# Patient Record
Sex: MALE | Race: BLACK OR AFRICAN AMERICAN | NOT HISPANIC OR LATINO | URBAN - METROPOLITAN AREA
[De-identification: names, ages, dates, MRNs, and addresses within clinical notes are randomized per-mention and may not be internally consistent; named-entity substitution may affect disease eponyms.]

---

## 2017-09-07 ENCOUNTER — INPATIENT (INPATIENT)
Facility: HOSPITAL | Age: 42
LOS: 4 days | Discharge: HOME | End: 2017-09-12
Attending: UROLOGY | Admitting: UROLOGY
Payer: MEDICAID

## 2017-09-07 VITALS
HEART RATE: 104 BPM | TEMPERATURE: 99 F | SYSTOLIC BLOOD PRESSURE: 124 MMHG | RESPIRATION RATE: 20 BRPM | OXYGEN SATURATION: 99 % | DIASTOLIC BLOOD PRESSURE: 98 MMHG

## 2017-09-07 LAB
ALBUMIN SERPL ELPH-MCNC: 4.1 G/DL — SIGNIFICANT CHANGE UP (ref 3.3–5)
ALP SERPL-CCNC: 99 U/L — SIGNIFICANT CHANGE UP (ref 40–120)
ALT FLD-CCNC: 25 U/L — SIGNIFICANT CHANGE UP (ref 4–41)
APPEARANCE UR: SIGNIFICANT CHANGE UP
APTT BLD: 31.1 SEC — SIGNIFICANT CHANGE UP (ref 27.5–37.4)
AST SERPL-CCNC: 19 U/L — SIGNIFICANT CHANGE UP (ref 4–40)
BASE EXCESS BLDV CALC-SCNC: 0.7 MMOL/L — SIGNIFICANT CHANGE UP
BASOPHILS # BLD AUTO: 0.02 K/UL — SIGNIFICANT CHANGE UP (ref 0–0.2)
BASOPHILS NFR BLD AUTO: 0.1 % — SIGNIFICANT CHANGE UP (ref 0–2)
BILIRUB SERPL-MCNC: 1 MG/DL — SIGNIFICANT CHANGE UP (ref 0.2–1.2)
BILIRUB UR-MCNC: NEGATIVE — SIGNIFICANT CHANGE UP
BLD GP AB SCN SERPL QL: NEGATIVE — SIGNIFICANT CHANGE UP
BLOOD GAS VENOUS - CREATININE: 1.17 MG/DL — SIGNIFICANT CHANGE UP (ref 0.5–1.3)
BLOOD UR QL VISUAL: HIGH
BUN SERPL-MCNC: 21 MG/DL — SIGNIFICANT CHANGE UP (ref 7–23)
CALCIUM SERPL-MCNC: 9.3 MG/DL — SIGNIFICANT CHANGE UP (ref 8.4–10.5)
CHLORIDE BLDV-SCNC: 102 MMOL/L — SIGNIFICANT CHANGE UP (ref 96–108)
CHLORIDE SERPL-SCNC: 98 MMOL/L — SIGNIFICANT CHANGE UP (ref 98–107)
CO2 SERPL-SCNC: 23 MMOL/L — SIGNIFICANT CHANGE UP (ref 22–31)
COLOR SPEC: YELLOW — SIGNIFICANT CHANGE UP
CREAT SERPL-MCNC: 1.16 MG/DL — SIGNIFICANT CHANGE UP (ref 0.5–1.3)
EOSINOPHIL # BLD AUTO: 0.02 K/UL — SIGNIFICANT CHANGE UP (ref 0–0.5)
EOSINOPHIL NFR BLD AUTO: 0.1 % — SIGNIFICANT CHANGE UP (ref 0–6)
GAS PNL BLDV: 137 MMOL/L — SIGNIFICANT CHANGE UP (ref 136–146)
GLUCOSE BLDV-MCNC: 121 — HIGH (ref 70–99)
GLUCOSE SERPL-MCNC: 119 MG/DL — HIGH (ref 70–99)
GLUCOSE UR-MCNC: NEGATIVE — SIGNIFICANT CHANGE UP
HCO3 BLDV-SCNC: 25 MMOL/L — SIGNIFICANT CHANGE UP (ref 20–27)
HCT VFR BLD CALC: 38.3 % — LOW (ref 39–50)
HCT VFR BLD CALC: 44.9 % — SIGNIFICANT CHANGE UP (ref 39–50)
HCT VFR BLDV CALC: 44.4 % — SIGNIFICANT CHANGE UP (ref 39–51)
HGB BLD-MCNC: 12.2 G/DL — LOW (ref 13–17)
HGB BLD-MCNC: 14.3 G/DL — SIGNIFICANT CHANGE UP (ref 13–17)
HGB BLDV-MCNC: 14.5 G/DL — SIGNIFICANT CHANGE UP (ref 13–17)
IMM GRANULOCYTES # BLD AUTO: 0.07 # — SIGNIFICANT CHANGE UP
IMM GRANULOCYTES NFR BLD AUTO: 0.5 % — SIGNIFICANT CHANGE UP (ref 0–1.5)
INR BLD: 1.03 — SIGNIFICANT CHANGE UP (ref 0.88–1.17)
KETONES UR-MCNC: SIGNIFICANT CHANGE UP
LACTATE BLDV-MCNC: 1.3 MMOL/L — SIGNIFICANT CHANGE UP (ref 0.5–2)
LEUKOCYTE ESTERASE UR-ACNC: NEGATIVE — SIGNIFICANT CHANGE UP
LIDOCAIN IGE QN: 58.7 U/L — SIGNIFICANT CHANGE UP (ref 7–60)
LYMPHOCYTES # BLD AUTO: 1.5 K/UL — SIGNIFICANT CHANGE UP (ref 1–3.3)
LYMPHOCYTES # BLD AUTO: 10.3 % — LOW (ref 13–44)
MCHC RBC-ENTMCNC: 27.2 PG — SIGNIFICANT CHANGE UP (ref 27–34)
MCHC RBC-ENTMCNC: 27.4 PG — SIGNIFICANT CHANGE UP (ref 27–34)
MCHC RBC-ENTMCNC: 31.8 % — LOW (ref 32–36)
MCHC RBC-ENTMCNC: 31.9 % — LOW (ref 32–36)
MCV RBC AUTO: 85.3 FL — SIGNIFICANT CHANGE UP (ref 80–100)
MCV RBC AUTO: 86 FL — SIGNIFICANT CHANGE UP (ref 80–100)
MONOCYTES # BLD AUTO: 0.83 K/UL — SIGNIFICANT CHANGE UP (ref 0–0.9)
MONOCYTES NFR BLD AUTO: 5.7 % — SIGNIFICANT CHANGE UP (ref 2–14)
MUCOUS THREADS # UR AUTO: SIGNIFICANT CHANGE UP
NEUTROPHILS # BLD AUTO: 12.09 K/UL — HIGH (ref 1.8–7.4)
NEUTROPHILS NFR BLD AUTO: 83.3 % — HIGH (ref 43–77)
NITRITE UR-MCNC: NEGATIVE — SIGNIFICANT CHANGE UP
NON-SQ EPI CELLS # UR AUTO: <1 — SIGNIFICANT CHANGE UP
NRBC # FLD: 0 — SIGNIFICANT CHANGE UP
NRBC # FLD: 0 — SIGNIFICANT CHANGE UP
PCO2 BLDV: 43 MMHG — SIGNIFICANT CHANGE UP (ref 41–51)
PH BLDV: 7.39 PH — SIGNIFICANT CHANGE UP (ref 7.32–7.43)
PH UR: 6 — SIGNIFICANT CHANGE UP (ref 4.6–8)
PLATELET # BLD AUTO: 244 K/UL — SIGNIFICANT CHANGE UP (ref 150–400)
PLATELET # BLD AUTO: 288 K/UL — SIGNIFICANT CHANGE UP (ref 150–400)
PMV BLD: 10 FL — SIGNIFICANT CHANGE UP (ref 7–13)
PMV BLD: 9.7 FL — SIGNIFICANT CHANGE UP (ref 7–13)
PO2 BLDV: 55 MMHG — HIGH (ref 35–40)
POTASSIUM BLDV-SCNC: 3.8 MMOL/L — SIGNIFICANT CHANGE UP (ref 3.4–4.5)
POTASSIUM SERPL-MCNC: 4 MMOL/L — SIGNIFICANT CHANGE UP (ref 3.5–5.3)
POTASSIUM SERPL-SCNC: 4 MMOL/L — SIGNIFICANT CHANGE UP (ref 3.5–5.3)
PROT SERPL-MCNC: 7.8 G/DL — SIGNIFICANT CHANGE UP (ref 6–8.3)
PROT UR-MCNC: 300 — SIGNIFICANT CHANGE UP
PROTHROM AB SERPL-ACNC: 11.5 SEC — SIGNIFICANT CHANGE UP (ref 9.8–13.1)
RBC # BLD: 4.49 M/UL — SIGNIFICANT CHANGE UP (ref 4.2–5.8)
RBC # BLD: 5.22 M/UL — SIGNIFICANT CHANGE UP (ref 4.2–5.8)
RBC # FLD: 15.1 % — HIGH (ref 10.3–14.5)
RBC # FLD: 15.4 % — HIGH (ref 10.3–14.5)
RBC CASTS # UR COMP ASSIST: >50 — HIGH (ref 0–?)
RH IG SCN BLD-IMP: POSITIVE — SIGNIFICANT CHANGE UP
SAO2 % BLDV: 87.9 % — HIGH (ref 60–85)
SODIUM SERPL-SCNC: 138 MMOL/L — SIGNIFICANT CHANGE UP (ref 135–145)
SP GR SPEC: 1.03 — SIGNIFICANT CHANGE UP (ref 1–1.03)
UROBILINOGEN FLD QL: NORMAL E.U. — SIGNIFICANT CHANGE UP (ref 0.1–0.2)
WBC # BLD: 14.18 K/UL — HIGH (ref 3.8–10.5)
WBC # BLD: 14.53 K/UL — HIGH (ref 3.8–10.5)
WBC # FLD AUTO: 14.18 K/UL — HIGH (ref 3.8–10.5)
WBC # FLD AUTO: 14.53 K/UL — HIGH (ref 3.8–10.5)
WBC UR QL: SIGNIFICANT CHANGE UP (ref 0–?)

## 2017-09-07 PROCEDURE — 71010: CPT | Mod: 26

## 2017-09-07 PROCEDURE — 74177 CT ABD & PELVIS W/CONTRAST: CPT | Mod: 26

## 2017-09-07 RX ORDER — MORPHINE SULFATE 50 MG/1
4 CAPSULE, EXTENDED RELEASE ORAL ONCE
Qty: 0 | Refills: 0 | Status: DISCONTINUED | OUTPATIENT
Start: 2017-09-07 | End: 2017-09-07

## 2017-09-07 RX ORDER — ONDANSETRON 8 MG/1
4 TABLET, FILM COATED ORAL ONCE
Qty: 0 | Refills: 0 | Status: COMPLETED | OUTPATIENT
Start: 2017-09-07 | End: 2017-09-07

## 2017-09-07 RX ORDER — SODIUM CHLORIDE 9 MG/ML
1000 INJECTION INTRAMUSCULAR; INTRAVENOUS; SUBCUTANEOUS ONCE
Qty: 0 | Refills: 0 | Status: COMPLETED | OUTPATIENT
Start: 2017-09-07 | End: 2017-09-07

## 2017-09-07 RX ADMIN — MORPHINE SULFATE 4 MILLIGRAM(S): 50 CAPSULE, EXTENDED RELEASE ORAL at 19:46

## 2017-09-07 RX ADMIN — SODIUM CHLORIDE 1000 MILLILITER(S): 9 INJECTION INTRAMUSCULAR; INTRAVENOUS; SUBCUTANEOUS at 19:17

## 2017-09-07 RX ADMIN — MORPHINE SULFATE 4 MILLIGRAM(S): 50 CAPSULE, EXTENDED RELEASE ORAL at 21:44

## 2017-09-07 RX ADMIN — MORPHINE SULFATE 4 MILLIGRAM(S): 50 CAPSULE, EXTENDED RELEASE ORAL at 19:17

## 2017-09-07 RX ADMIN — ONDANSETRON 4 MILLIGRAM(S): 8 TABLET, FILM COATED ORAL at 19:17

## 2017-09-07 NOTE — ED PROVIDER NOTE - PROGRESS NOTE DETAILS
Gollogly: called by radiology - pt with a L perirenal and RP hematoma, suspected renal mass, hyperattenuation on CT consistent with tumor vascularity vs contrast extravasation into the hematoma. Called surgery, paged urology, discussed with pt. Arya att: Patient seen by  and Uro. Request repeat CT non con eval lt flank mass.

## 2017-09-07 NOTE — CONSULT NOTE ADULT - SUBJECTIVE AND OBJECTIVE BOX
GENERAL SURGERY CONSULT NOTE    Patient is a 42y old  Male who presents with a chief complaint of abdominal pain    HPI: 41yo M presents with abdominal pain and vomiting x24 hours. Pain is in LLQ and back, crampy and on and off. No history of pain like this in the past. no history of renal problems. Vx5 - initially yellow and then blood tinged at the end. No fever, no diarrhea. Last BM 2 days ago. Urine brown in color but voiding a normal amount per patient. No recent trauma. Pt's aid at bedside and confirmed history.       PAST MEDICAL & SURGICAL HISTORY:  CVA (cerebral vascular accident)  HLD (hyperlipidemia)  HTN (hypertension)  Guillain-[place] syndrome  No significant past surgical history    [  ] No significant past history as reviewed with the patient and family    FAMILY HISTORY:    [  ] Family history not pertinent as reviewed with the patient and family    SOCIAL HISTORY:    MEDICATIONS  (STANDING):    MEDICATIONS  (PRN):    Allergies    apple (Vomiting)  penicillin (Hives)    Intolerances        Vital Signs Last 24 Hrs  T(C): 36.7 (07 Sep 2017 19:53), Max: 37 (07 Sep 2017 16:47)  T(F): 98 (07 Sep 2017 19:53), Max: 98.6 (07 Sep 2017 16:47)  HR: 101 (07 Sep 2017 19:53) (100 - 104)  BP: 125/91 (07 Sep 2017 19:53) (120/80 - 125/91)  BP(mean): --  RR: 18 (07 Sep 2017 19:53) (18 - 20)  SpO2: 100% (07 Sep 2017 19:53) (98% - 100%)  Daily     Daily     Exam:  General: awake, alert, NAD  HEENT: NCAT, MMM, well healed trach site  Resp: nonlabored  Chest: equal chest rise  Abd: soft, ND, LLQ tenderness with guarding, no rebound, L CVA tenderness  Ext: PARISI, b/l hands contracted                            14.3   14.53 )-----------( 288      ( 07 Sep 2017 19:22 )             44.9     09-    138  |  98  |  21  ----------------------------<  119<H>  4.0   |  23  |  1.16    Ca    9.3      07 Sep 2017 19:05    TPro  7.8  /  Alb  4.1  /  TBili  1.0  /  DBili  x   /  AST  19  /  ALT  25  /  AlkPhos  99  09-07    PT/INR - ( 07 Sep 2017 19:22 )   PT: 11.5 SEC;   INR: 1.03          PTT - ( 07 Sep 2017 19:22 )  PTT:31.1 SEC  Urinalysis Basic - ( 07 Sep 2017 19:03 )    Color: YELLOW / Appearance: HAZY / S.030 / pH: 6.0  Gluc: NEGATIVE / Ketone: TRACE  / Bili: NEGATIVE / Urobili: NORMAL E.U.   Blood: MODERATE / Protein: 300 / Nitrite: NEGATIVE   Leuk Esterase: NEGATIVE / RBC: >50 / WBC 2-5   Sq Epi: x / Non Sq Epi: x / Bacteria: x        IMAGING STUDIES:  FINDINGS:    LOWER CHEST: Subsegmental atelectasis within the left lower lobe.    LIVER: Within normal limits.  BILE DUCTS: Normal caliber.  GALLBLADDER: Within normal limits.  SPLEEN: Within normal limits.  PANCREAS: Within normal limits.  ADRENALS: Within normal limits.  KIDNEYS/URETERS: Large left renal subcapsular hematoma measuring 8.5 x   6.6 x 10.0 cm. There is mass effect on left kidney which is markedly   compressed. Tiny curvilinear hyperattenuating areas within the hematoma   may represent active contrast extravasation versus tumor vascularity.   There is suggestion of a low-density lesion within the midpole of the   left kidney measuring up to 3.0 cm, however it is obscured by hematoma.   There is also mild perinephric hemorrhage. A moderate amount of   hemorrhage in the anterior pararenal space extends inferiorly into the   extraperitoneal space within the pelvis. There are bilateral   nonobstructing renal calculi measuring up to 7 mm in the right and 6 mm   on the left. Several subcentimeter hypodensities within the right kidney   are too small to characterize. No hydronephrosis. There is a 5 mm   calculus in the distal right ureter without associated hydronephrosis or    BLADDER: A 3 mm calculus is noted within the urinary bladder.  REPRODUCTIVE ORGANS: The prostate is within normal limits.    BOWEL: No bowel obstruction. Appendix is normal.  PERITONEUM: No ascites.  VESSELS:  Within normal limits.  RETROPERITONEUM: No lymphadenopathy.    ABDOMINAL WALL: Within normal limits.  BONES: Within normal limits.    IMPRESSION:      Large left renal subcapsular hematoma with suspicion for an underlying   renal lesion as described above. Curvilinear hyperattenuating areas   within the hematoma may represent active contrast extravasation versus   tumor vascularity. A follow-up noncontrast CT is recommended in 3 to 4   hours to evaluate size of hematoma. A follow-up renal MRI is recommended   in 3-4 weeks to evaluate for underlying renal lesion.    A 5 mm distal right ureter calculus and additional bilateral   subcentimeter nonobstructing renal calculi. No hydronephrosis.    Perinephric and retroperitoneal hemorrhage as described above.

## 2017-09-07 NOTE — ED ADULT NURSE NOTE - OBJECTIVE STATEMENT
pt c.o. severe lt side abd pain and back pain that started last night. +vomiting. denies diarrhea, fever. and dysuria. abd slightly firm and slightly distended. pt states his belly is larger than normal. last BM 2 days ago and states "normal" for him. sl placed labs sent.

## 2017-09-07 NOTE — ED PROVIDER NOTE - OBJECTIVE STATEMENT
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 The pain is in the LLQ radiating to the back, constant, associated with N/V x 7-8 including approx 1/4 cup hematemesis. Last BM 2 days ago (usually has BM every 2 days), not passing flatus. No dysuria/urgency/frequency. No fevers/chills.  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 The pain is in the LLQ radiating to the back, constant, associated with N/V x 7-8 including approx 1/4 cup hematemesis. Last BM 2 days ago (usually has BM every 2 days), not passing flatus. No dysuria/urgency/frequency. No fevers/chills. No new weakness/numbness.  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 The pain is in the LLQ radiating to the back, constant, associated with N/V x 7-8 including approx 1/4 cup hematemesis. Last BM 2 days ago (usually has BM every 2 days), not passing flatus. No dysuria/urgency/frequency. No fevers/chills. No new weakness/numbness.  Meds: carvedilol, simvastatin, ASA 81mg, stool softener, vitamin D and iron.     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 The pain is in the LLQ radiating to the back, constant, associated with N/V x 7-8 including approx 1/4 cup hematemesis. Last BM 2 days ago (usually has BM every 2 days), not passing flatus. No dysuria/urgency/frequency. No fevers/chills. No new weakness/numbness.  Meds: carvedilol, simvastatin, ASA 81mg, stool softener, vitamin D and iron.     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 The pain is in the LLQ radiating to the back, constant, associated with N/V x 7-8 including approx 1/4 cup hematemesis. Last BM 2 days ago (usually has BM every 2 days), not passing flatus. No dysuria/urgency/frequency. No fevers/chills. No new weakness/numbness.  Meds: carvedilol, simvastatin, ASA 81mg, stool softener, vitamin D and iron.     18:46 Koenig att: 42F h/o GBS s/p trache removal, CVA resid rt face arm and leg weakness c/o lt sided abd pain x 2 days. Since yesterday patient notes abdominal pain, points to left upper back and left upper stomach, constant, "punching," nonrad, pos nausea. Had several episodes of bilious emesis yesterday, last episode 2-3 hours ago vomited 1/4 cup maroon blood. On asa 81 mg qd, denies ulcer gi bleed throat pain or chest pain. Denies f, c, d, dysuria, hematuria.

## 2017-09-07 NOTE — ED PROVIDER NOTE - PHYSICAL EXAMINATION
Motor exam: RUE 4/5, LUE 5/5. RLE/LLE 4/5. Motor exam: RUE 4/5, LUE 5/5. RLE/LLE 4/5.    18:46 [facility] att: nad, aaox3, ctabl, rrr, s1s2, pos lt cva tenderness, pos luq tender and guard, neg llq tenderness, neg suprapubic tenderness, neg le edema

## 2017-09-07 NOTE — ED PROVIDER NOTE - MEDICAL DECISION MAKING DETAILS
42M with LLQ pain and vomiting. Ddx: diverticulitis, SBO, appy, pancreatitis, kidney stones, pyelo. Hematemesis occurred in the setting of frequent vomiting, possible upper GIB but more likely due to mucosal irritation. Plan: labs, UA, fluids, pain control/antiemetics PRN, CTAP, reassess.

## 2017-09-07 NOTE — ED PROVIDER NOTE - PMH
CVA (cerebral vascular accident)    Guillain-[place] syndrome    HLD (hyperlipidemia)    HTN (hypertension)

## 2017-09-07 NOTE — CONSULT NOTE ADULT - ASSESSMENT
43yo M with PMH of htn, hld, guillian barre, and CVA presents today with abdominal pain and vomiting and found to have L renal subcapsular hematoma with possible underlying renal lesion with active extravasation vs. tumor vascularity, perinephric and RP hematoma. Patient hemodynamically stable with hct of 45 in ED.     - care as per urology  - would do serial CBCs and place patient in a monitored setting, transfuse as needed and consider IR embolization if patient shows signs of bleeding  - follow up imaging per urology  - please call general surgery with any questions or acute change in patient status    Discussed with Dr. Quick team surgery  [id number]

## 2017-09-07 NOTE — ED PROVIDER NOTE - ATTENDING CONTRIBUTION TO CARE
Dr. Koenig: I have personally seen and examined this patient at the bedside. I have fully participated in the care of this patient. I have reviewed all pertinent clinical information, including history, physical exam, plan and the Resident's note and agree except as noted. HPI above as by me. PE above as by me. 45M h/o GBS and CVA with no residual sensory deficit c/o lt flank and luq pain. DDX divertic, stone, pyelo PLAN labs, ua, ct po and iv contrast, pain and nausea control.

## 2017-09-07 NOTE — ED ADULT TRIAGE NOTE - CHIEF COMPLAINT QUOTE
Pt with gulliam-barre disease. Pt co abdominal pain with nausea and vomiting. Pt reports no BM x 2 days.

## 2017-09-08 DIAGNOSIS — S37.019A MINOR CONTUSION OF UNSPECIFIED KIDNEY, INITIAL ENCOUNTER: ICD-10-CM

## 2017-09-08 DIAGNOSIS — R00.0 TACHYCARDIA, UNSPECIFIED: ICD-10-CM

## 2017-09-08 DIAGNOSIS — D50.9 IRON DEFICIENCY ANEMIA, UNSPECIFIED: ICD-10-CM

## 2017-09-08 DIAGNOSIS — E78.5 HYPERLIPIDEMIA, UNSPECIFIED: ICD-10-CM

## 2017-09-08 DIAGNOSIS — I63.9 CEREBRAL INFARCTION, UNSPECIFIED: ICD-10-CM

## 2017-09-08 DIAGNOSIS — Z43.0 ENCOUNTER FOR ATTENTION TO TRACHEOSTOMY: Chronic | ICD-10-CM

## 2017-09-08 DIAGNOSIS — S37.012A MINOR CONTUSION OF LEFT KIDNEY, INITIAL ENCOUNTER: ICD-10-CM

## 2017-09-08 DIAGNOSIS — R65.10 SYSTEMIC INFLAMMATORY RESPONSE SYNDROME (SIRS) OF NON-INFECTIOUS ORIGIN WITHOUT ACUTE ORGAN DYSFUNCTION: ICD-10-CM

## 2017-09-08 DIAGNOSIS — D72.829 ELEVATED WHITE BLOOD CELL COUNT, UNSPECIFIED: ICD-10-CM

## 2017-09-08 DIAGNOSIS — I10 ESSENTIAL (PRIMARY) HYPERTENSION: ICD-10-CM

## 2017-09-08 DIAGNOSIS — G61.0 GUILLAIN-BARRE SYNDROME: ICD-10-CM

## 2017-09-08 DIAGNOSIS — N20.0 CALCULUS OF KIDNEY: ICD-10-CM

## 2017-09-08 LAB
BUN SERPL-MCNC: 19 MG/DL — SIGNIFICANT CHANGE UP (ref 7–23)
CALCIUM SERPL-MCNC: 8.5 MG/DL — SIGNIFICANT CHANGE UP (ref 8.4–10.5)
CHLORIDE SERPL-SCNC: 101 MMOL/L — SIGNIFICANT CHANGE UP (ref 98–107)
CK MB BLD-MCNC: 1.38 NG/ML — SIGNIFICANT CHANGE UP (ref 1–6.6)
CK MB BLD-MCNC: SIGNIFICANT CHANGE UP (ref 0–2.5)
CK SERPL-CCNC: 76 U/L — SIGNIFICANT CHANGE UP (ref 30–200)
CO2 SERPL-SCNC: 24 MMOL/L — SIGNIFICANT CHANGE UP (ref 22–31)
CREAT SERPL-MCNC: 1.15 MG/DL — SIGNIFICANT CHANGE UP (ref 0.5–1.3)
GLUCOSE SERPL-MCNC: 119 MG/DL — HIGH (ref 70–99)
HCT VFR BLD CALC: 32.8 % — LOW (ref 39–50)
HCT VFR BLD CALC: 36.6 % — LOW (ref 39–50)
HCT VFR BLD CALC: 38.7 % — LOW (ref 39–50)
HGB BLD-MCNC: 10.5 G/DL — LOW (ref 13–17)
HGB BLD-MCNC: 11.5 G/DL — LOW (ref 13–17)
HGB BLD-MCNC: 12 G/DL — LOW (ref 13–17)
MCHC RBC-ENTMCNC: 26.5 PG — LOW (ref 27–34)
MCHC RBC-ENTMCNC: 27.3 PG — SIGNIFICANT CHANGE UP (ref 27–34)
MCHC RBC-ENTMCNC: 28.4 PG — SIGNIFICANT CHANGE UP (ref 27–34)
MCHC RBC-ENTMCNC: 31 % — LOW (ref 32–36)
MCHC RBC-ENTMCNC: 31.4 % — LOW (ref 32–36)
MCHC RBC-ENTMCNC: 32 % — SIGNIFICANT CHANGE UP (ref 32–36)
MCV RBC AUTO: 85.6 FL — SIGNIFICANT CHANGE UP (ref 80–100)
MCV RBC AUTO: 86.9 FL — SIGNIFICANT CHANGE UP (ref 80–100)
MCV RBC AUTO: 88.6 FL — SIGNIFICANT CHANGE UP (ref 80–100)
NRBC # FLD: 0 — SIGNIFICANT CHANGE UP
PLATELET # BLD AUTO: 226 K/UL — SIGNIFICANT CHANGE UP (ref 150–400)
PLATELET # BLD AUTO: 241 K/UL — SIGNIFICANT CHANGE UP (ref 150–400)
PLATELET # BLD AUTO: 250 K/UL — SIGNIFICANT CHANGE UP (ref 150–400)
PMV BLD: 9.4 FL — SIGNIFICANT CHANGE UP (ref 7–13)
PMV BLD: 9.7 FL — SIGNIFICANT CHANGE UP (ref 7–13)
PMV BLD: 9.9 FL — SIGNIFICANT CHANGE UP (ref 7–13)
POTASSIUM SERPL-MCNC: 3.9 MMOL/L — SIGNIFICANT CHANGE UP (ref 3.5–5.3)
POTASSIUM SERPL-SCNC: 3.9 MMOL/L — SIGNIFICANT CHANGE UP (ref 3.5–5.3)
RBC # BLD: 3.7 M/UL — LOW (ref 4.2–5.8)
RBC # BLD: 4.21 M/UL — SIGNIFICANT CHANGE UP (ref 4.2–5.8)
RBC # BLD: 4.52 M/UL — SIGNIFICANT CHANGE UP (ref 4.2–5.8)
RBC # FLD: 15.3 % — HIGH (ref 10.3–14.5)
RBC # FLD: 15.5 % — HIGH (ref 10.3–14.5)
RBC # FLD: 15.7 % — HIGH (ref 10.3–14.5)
RH IG SCN BLD-IMP: POSITIVE — SIGNIFICANT CHANGE UP
SODIUM SERPL-SCNC: 139 MMOL/L — SIGNIFICANT CHANGE UP (ref 135–145)
TROPONIN T SERPL-MCNC: < 0.06 NG/ML — SIGNIFICANT CHANGE UP (ref 0–0.06)
WBC # BLD: 12.04 K/UL — HIGH (ref 3.8–10.5)
WBC # BLD: 12.73 K/UL — HIGH (ref 3.8–10.5)
WBC # BLD: 14.51 K/UL — HIGH (ref 3.8–10.5)
WBC # FLD AUTO: 12.04 K/UL — HIGH (ref 3.8–10.5)
WBC # FLD AUTO: 12.73 K/UL — HIGH (ref 3.8–10.5)
WBC # FLD AUTO: 14.51 K/UL — HIGH (ref 3.8–10.5)

## 2017-09-08 PROCEDURE — 74176 CT ABD & PELVIS W/O CONTRAST: CPT | Mod: 26

## 2017-09-08 PROCEDURE — 99223 1ST HOSP IP/OBS HIGH 75: CPT

## 2017-09-08 PROCEDURE — 93010 ELECTROCARDIOGRAM REPORT: CPT

## 2017-09-08 RX ORDER — ONDANSETRON 8 MG/1
4 TABLET, FILM COATED ORAL EVERY 6 HOURS
Qty: 0 | Refills: 0 | Status: DISCONTINUED | OUTPATIENT
Start: 2017-09-08 | End: 2017-09-12

## 2017-09-08 RX ORDER — OXYCODONE AND ACETAMINOPHEN 5; 325 MG/1; MG/1
2 TABLET ORAL EVERY 6 HOURS
Qty: 0 | Refills: 0 | Status: DISCONTINUED | OUTPATIENT
Start: 2017-09-08 | End: 2017-09-12

## 2017-09-08 RX ORDER — FERROUS SULFATE 325(65) MG
325 TABLET ORAL DAILY
Qty: 0 | Refills: 0 | Status: DISCONTINUED | OUTPATIENT
Start: 2017-09-08 | End: 2017-09-12

## 2017-09-08 RX ORDER — SIMVASTATIN 20 MG/1
1 TABLET, FILM COATED ORAL
Qty: 0 | Refills: 0 | COMMUNITY

## 2017-09-08 RX ORDER — CARVEDILOL PHOSPHATE 80 MG/1
1 CAPSULE, EXTENDED RELEASE ORAL
Qty: 0 | Refills: 0 | COMMUNITY

## 2017-09-08 RX ORDER — SENNA PLUS 8.6 MG/1
2 TABLET ORAL AT BEDTIME
Qty: 0 | Refills: 0 | Status: DISCONTINUED | OUTPATIENT
Start: 2017-09-08 | End: 2017-09-09

## 2017-09-08 RX ORDER — DOCUSATE SODIUM 100 MG
1 CAPSULE ORAL
Qty: 0 | Refills: 0 | COMMUNITY

## 2017-09-08 RX ORDER — CARVEDILOL PHOSPHATE 80 MG/1
6.25 CAPSULE, EXTENDED RELEASE ORAL EVERY 12 HOURS
Qty: 0 | Refills: 0 | Status: DISCONTINUED | OUTPATIENT
Start: 2017-09-08 | End: 2017-09-08

## 2017-09-08 RX ORDER — HYDROMORPHONE HYDROCHLORIDE 2 MG/ML
0.5 INJECTION INTRAMUSCULAR; INTRAVENOUS; SUBCUTANEOUS
Qty: 0 | Refills: 0 | Status: DISCONTINUED | OUTPATIENT
Start: 2017-09-08 | End: 2017-09-12

## 2017-09-08 RX ORDER — ACETAMINOPHEN 500 MG
650 TABLET ORAL EVERY 6 HOURS
Qty: 0 | Refills: 0 | Status: DISCONTINUED | OUTPATIENT
Start: 2017-09-08 | End: 2017-09-12

## 2017-09-08 RX ORDER — SIMVASTATIN 20 MG/1
10 TABLET, FILM COATED ORAL AT BEDTIME
Qty: 0 | Refills: 0 | Status: DISCONTINUED | OUTPATIENT
Start: 2017-09-08 | End: 2017-09-08

## 2017-09-08 RX ORDER — TAMSULOSIN HYDROCHLORIDE 0.4 MG/1
0.4 CAPSULE ORAL AT BEDTIME
Qty: 0 | Refills: 0 | Status: DISCONTINUED | OUTPATIENT
Start: 2017-09-08 | End: 2017-09-12

## 2017-09-08 RX ORDER — SODIUM CHLORIDE 9 MG/ML
1000 INJECTION INTRAMUSCULAR; INTRAVENOUS; SUBCUTANEOUS
Qty: 0 | Refills: 0 | Status: DISCONTINUED | OUTPATIENT
Start: 2017-09-08 | End: 2017-09-10

## 2017-09-08 RX ORDER — ALBUTEROL 90 UG/1
2 AEROSOL, METERED ORAL EVERY 6 HOURS
Qty: 0 | Refills: 0 | Status: DISCONTINUED | OUTPATIENT
Start: 2017-09-08 | End: 2017-09-12

## 2017-09-08 RX ORDER — HEPARIN SODIUM 5000 [USP'U]/ML
5000 INJECTION INTRAVENOUS; SUBCUTANEOUS EVERY 12 HOURS
Qty: 0 | Refills: 0 | Status: DISCONTINUED | OUTPATIENT
Start: 2017-09-08 | End: 2017-09-08

## 2017-09-08 RX ORDER — OXYCODONE AND ACETAMINOPHEN 5; 325 MG/1; MG/1
1 TABLET ORAL EVERY 4 HOURS
Qty: 0 | Refills: 0 | Status: DISCONTINUED | OUTPATIENT
Start: 2017-09-08 | End: 2017-09-12

## 2017-09-08 RX ORDER — CARVEDILOL PHOSPHATE 80 MG/1
12.5 CAPSULE, EXTENDED RELEASE ORAL EVERY 12 HOURS
Qty: 0 | Refills: 0 | Status: DISCONTINUED | OUTPATIENT
Start: 2017-09-08 | End: 2017-09-09

## 2017-09-08 RX ORDER — FERROUS SULFATE 325(65) MG
1 TABLET ORAL
Qty: 0 | Refills: 0 | COMMUNITY

## 2017-09-08 RX ORDER — DOCUSATE SODIUM 100 MG
100 CAPSULE ORAL THREE TIMES A DAY
Qty: 0 | Refills: 0 | Status: DISCONTINUED | OUTPATIENT
Start: 2017-09-08 | End: 2017-09-12

## 2017-09-08 RX ORDER — SIMVASTATIN 20 MG/1
40 TABLET, FILM COATED ORAL AT BEDTIME
Qty: 0 | Refills: 0 | Status: DISCONTINUED | OUTPATIENT
Start: 2017-09-08 | End: 2017-09-12

## 2017-09-08 RX ORDER — BENAZEPRIL HYDROCHLORIDE 40 MG/1
1 TABLET ORAL
Qty: 0 | Refills: 0 | COMMUNITY

## 2017-09-08 RX ADMIN — OXYCODONE AND ACETAMINOPHEN 1 TABLET(S): 5; 325 TABLET ORAL at 07:20

## 2017-09-08 RX ADMIN — Medication 100 MILLIGRAM(S): at 07:20

## 2017-09-08 RX ADMIN — Medication 100 MILLIGRAM(S): at 21:21

## 2017-09-08 RX ADMIN — CARVEDILOL PHOSPHATE 6.25 MILLIGRAM(S): 80 CAPSULE, EXTENDED RELEASE ORAL at 07:20

## 2017-09-08 RX ADMIN — TAMSULOSIN HYDROCHLORIDE 0.4 MILLIGRAM(S): 0.4 CAPSULE ORAL at 21:20

## 2017-09-08 RX ADMIN — MORPHINE SULFATE 4 MILLIGRAM(S): 50 CAPSULE, EXTENDED RELEASE ORAL at 02:12

## 2017-09-08 RX ADMIN — OXYCODONE AND ACETAMINOPHEN 2 TABLET(S): 5; 325 TABLET ORAL at 21:21

## 2017-09-08 RX ADMIN — Medication 100 MILLIGRAM(S): at 14:24

## 2017-09-08 RX ADMIN — OXYCODONE AND ACETAMINOPHEN 1 TABLET(S): 5; 325 TABLET ORAL at 08:15

## 2017-09-08 RX ADMIN — OXYCODONE AND ACETAMINOPHEN 2 TABLET(S): 5; 325 TABLET ORAL at 15:30

## 2017-09-08 RX ADMIN — SODIUM CHLORIDE 100 MILLILITER(S): 9 INJECTION INTRAMUSCULAR; INTRAVENOUS; SUBCUTANEOUS at 07:21

## 2017-09-08 RX ADMIN — OXYCODONE AND ACETAMINOPHEN 2 TABLET(S): 5; 325 TABLET ORAL at 14:55

## 2017-09-08 RX ADMIN — OXYCODONE AND ACETAMINOPHEN 2 TABLET(S): 5; 325 TABLET ORAL at 21:51

## 2017-09-08 RX ADMIN — Medication 325 MILLIGRAM(S): at 14:24

## 2017-09-08 RX ADMIN — SIMVASTATIN 40 MILLIGRAM(S): 20 TABLET, FILM COATED ORAL at 21:21

## 2017-09-08 NOTE — CONSULT NOTE ADULT - PROBLEM SELECTOR PROBLEM 9
Hyperlipidemia, unspecified hyperlipidemia type Cerebrovascular accident (CVA), unspecified mechanism

## 2017-09-08 NOTE — H&P ADULT - PMH
CVA (cerebral vascular accident)    Guillain-[place] syndrome    HLD (hyperlipidemia)    HTN (hypertension)

## 2017-09-08 NOTE — H&P ADULT - NSHPLABSRESULTS_GEN_ALL_CORE
07 Sep 2017 19:05    138    |  98     |  21     ----------------------------<  119    4.0     |  23     |  1.16     Ca    9.3        07 Sep 2017 19:05                            12.2   14.18 )-----------( 244      ( 07 Sep 2017 22:36 )             38.3       Urinalysis Basic - ( 07 Sep 2017 19:03 )    Color: YELLOW / Appearance: HAZY / S.030 / pH: 6.0  Gluc: NEGATIVE / Ketone: TRACE  / Bili: NEGATIVE / Urobili: NORMAL E.U.   Blood: MODERATE / Protein: 300 / Nitrite: NEGATIVE   Leuk Esterase: NEGATIVE / RBC: >50 / WBC 2-5   Sq Epi: x / Non Sq Epi: x / Bacteria: x      < from: CT Abdomen and Pelvis w/ Oral Cont and w/ IV Cont (17 @ 20:39) >    FINDINGS:    LOWER CHEST: Subsegmental atelectasis within the left lower lobe.    LIVER: Within normal limits.  BILE DUCTS: Normal caliber.  GALLBLADDER: Within normal limits.  SPLEEN: Within normal limits.  PANCREAS: Within normal limits.  ADRENALS: Within normal limits.  KIDNEYS/URETERS: Large left renal subcapsular hematoma measuring 8.5 x   6.6 x 10.0 cm. There is mass effect on left kidney which is markedly   compressed. Tiny curvilinear hyperattenuating areas within the hematoma   may represent active contrast extravasation versus tumor vascularity.   There is suggestion of a low-density lesion within the midpole of the   left kidney measuring up to 3.0 cm, however it is obscured by hematoma.   There is also mild perinephric hemorrhage. A moderate amount of   hemorrhage in the anterior pararenal space extends inferiorly into the   extraperitoneal space within the pelvis. There are bilateral   nonobstructing renal calculi measuring up to 7 mm in the right and 6 mm   on the left. Several subcentimeter hypodensities within the right kidney   are too small to characterize. No hydronephrosis. There is a 5 mm   calculus in the distal right ureter without associated hydronephrosis or    BLADDER: A 3 mm calculus is noted within the urinary bladder.  REPRODUCTIVE ORGANS: The prostate is within normal limits.    BOWEL: No bowel obstruction. Appendix is normal.  PERITONEUM: No ascites.  VESSELS:  Within normal limits.  RETROPERITONEUM: No lymphadenopathy.    ABDOMINAL WALL: Within normal limits.  BONES: Within normal limits.    IMPRESSION:      Large left renal subcapsular hematoma with suspicion for an underlying   renal lesion as described above. Curvilinear hyperattenuating areas   within the hematoma may represent active contrast extravasation versus   tumor vascularity. A follow-up noncontrast CT is recommended in 3 to 4   hours to evaluate size of hematoma. A follow-up renal MRI is recommended   in 3-4 weeks to evaluate for underlying renal lesion.    A 5 mm distal right ureter calculus and additional bilateral   subcentimeter nonobstructing renal calculi. No hydronephrosis.    Perinephric and retroperitoneal hemorrhage as described above.

## 2017-09-08 NOTE — H&P ADULT - NSHPREVIEWOFSYSTEMS_GEN_ALL_CORE
Constitutional: no fever, no chills  Cardiovascular: negative  Respiratory: negative  Gastroenterology: abdominal pain, vomiting  Genitourinary: negative  MSK: negative

## 2017-09-08 NOTE — PROGRESS NOTE ADULT - ASSESSMENT
46 yo male with paraplegia; guillian-barre; CVA admitted with L. subcapsular hematoma 44 yo male with paraplegia; guillian-barre; CVA admitted with L. subcapsular hematoma; agustin non-obst kidney stones; R. 5mm distal uret stone

## 2017-09-08 NOTE — H&P ADULT - ATTENDING COMMENTS
Pt seen and examined, films reviewed- sig subcapsular bleed left kidney, but has remained hemodynamically stable.  Bilateral significant stone burden, including 2 distal right ureteral stones but without significant hydro.    We had long discussion about stability of bleed, assessing stone for passage vs. need for treatment, and future metabolic prevention.

## 2017-09-08 NOTE — H&P ADULT - HISTORY OF PRESENT ILLNESS
43yo M with PMHx of Gull[place] in 2000, CVA with bilateral upper extremity weakness, paraplegic who presents with LLQ pain x 24hrs. The LLQ pain is intermittent, dull, radiating to the left flank and back associated with nausea and multiple episodes of vomiting, initially yellowish in color and blood-tinged in the end. The pain came about while laying down, and he is non-ambulatory. Last BM was two days ago, which is normal for him. Of note, he is urinating well without any issues. Denies hematuria, abdominal trauma, h/o kidney stones, and or dysuria.   In ED vitals: Tm/c: 98.0, BP: 125/91, HR: 101-104, 02: 100%ra. Given 1L of NS, 8mg of Morphine IVSS

## 2017-09-08 NOTE — PROGRESS NOTE ADULT - SUBJECTIVE AND OBJECTIVE BOX
S: Patient (-) flatus, (-) BM; denies fevers, chills, nausea, emesis, chest pain, SOB.    O: Vital Signs Last 24 Hrs  T(C): 36.9 (08 Sep 2017 17:11), Max: 37.6 (08 Sep 2017 03:29)  T(F): 98.4 (08 Sep 2017 17:11), Max: 99.6 (08 Sep 2017 03:29)  HR: 100 (08 Sep 2017 17:11) (98 - 113)  BP: 101/65 (08 Sep 2017 17:11) (100/61 - 125/91)  BP(mean): --  RR: 16 (08 Sep 2017 17:11) (16 - 18)  SpO2: 96% (08 Sep 2017 17:11) (96% - 100%)    I&O's Detail    08 Sep 2017 07:01  -  08 Sep 2017 19:35  --------------------------------------------------------  IN:  Total IN: 0 mL    OUT:    Voided: 225 mL  Total OUT: 225 mL    Total NET: -225 mL      Exam:  General: awake, alert, NAD  HEENT: NCAT, MMM, well healed trach site  Resp: nonlabored  Chest: equal chest rise  Abd: soft, ND, LLQ tenderness with guarding, no rebound, L CVA tenderness  Ext: PARISI, b/l hands contracted                          10.5   14.51 )-----------( 226      ( 08 Sep 2017 16:45 )             32.8   09-08    139  |  101  |  19    Interval CT   < from: CT Abdomen and Pelvis No Cont (09.08.17 @ 02:00) >  KIDNEYS/URETERS: There is a redemonstrated large left renal subcapsular   hematoma which measures 9.7 x 8.4 x 11.2 cm, previously measuring 9.5 x   8.6 x 11.2 cm on the prior study performed earlier today. The previously   noted curvilinear hyperattenuating areas within the hematoma are not   appreciated on the current examination. The hematoma is again noted to   exert significant mass effect on the left kidney. There is again   suggestion of a hypoattenuating lesion in the left renal interpolar   region. Hemorrhage in the anterior and posterior left pararenal spaces   extending into the extraperitoneal space inferiorly does not appear   significantly changed. Subcentimeter hypodensities in the right kidney   are too small to characterize. Excreted contrast material is seen in the   right renal collecting system.    BLADDER: Filled with excreted intravenous contrast material.  REPRODUCTIVE ORGANS: The prostate is within normal limits.    BOWEL: No bowel obstruction. Normal appendix.  PERITONEUM: No ascites.  VESSELS: Within normal limits.  RETROPERITONEUM: No lymphadenopathy.    ABDOMINAL WALL: Within normal limits.  BONES: Mild degenerative changes of bilateral hips.    IMPRESSION:     Stable large left renal subcapsular hematoma with associated perinephric   and retroperitoneal hemorrhage.     < end of copied text >    ----------------------------<  119<H>  3.9   |  24  |  1.15    Ca    8.5      08 Sep 2017 04:20    TPro  7.8  /  Alb  4.1  /  TBili  1.0  /  DBili  x   /  AST  19  /  ALT  25  /  AlkPhos  99  09-07 S: Patient said he has abdominal pain and his pain medication is mark off.  Overallm he cannot tell if it is better or worse.  He wants to eat, does not feel nauseated and has not vomited.  His last BM was 2 days ago as was the last time he passed gas.    O: Vital Signs Last 24 Hrs  T(C): 36.9 (08 Sep 2017 17:11), Max: 37.6 (08 Sep 2017 03:29)  T(F): 98.4 (08 Sep 2017 17:11), Max: 99.6 (08 Sep 2017 03:29)  HR: 100 (08 Sep 2017 17:11) (98 - 113)  BP: 101/65 (08 Sep 2017 17:11) (100/61 - 125/91)  BP(mean): --  RR: 16 (08 Sep 2017 17:11) (16 - 18)  SpO2: 96% (08 Sep 2017 17:11) (96% - 100%)    I&O's Detail    08 Sep 2017 07:01  -  08 Sep 2017 19:35  --------------------------------------------------------  IN:  Total IN: 0 mL    OUT:    Voided: 225 mL  Total OUT: 225 mL    Total NET: -225 mL      Exam:  General: awake, alert, NAD  HEENT: NCAT, MMM, well healed trach site  Resp: nonlabored  Chest: equal chest rise  Abd: soft, distended, L abdominal tenderness, no rebound, L Flank tenderness  Ext: PARISI, b/l hands contracted                          10.5   14.51 )-----------( 226      ( 08 Sep 2017 16:45 )             32.8   09-08    139  |  101  |  19    Interval CT   < from: CT Abdomen and Pelvis No Cont (09.08.17 @ 02:00) >  KIDNEYS/URETERS: There is a redemonstrated large left renal subcapsular   hematoma which measures 9.7 x 8.4 x 11.2 cm, previously measuring 9.5 x   8.6 x 11.2 cm on the prior study performed earlier today. The previously   noted curvilinear hyperattenuating areas within the hematoma are not   appreciated on the current examination. The hematoma is again noted to   exert significant mass effect on the left kidney. There is again   suggestion of a hypoattenuating lesion in the left renal interpolar   region. Hemorrhage in the anterior and posterior left pararenal spaces   extending into the extraperitoneal space inferiorly does not appear   significantly changed. Subcentimeter hypodensities in the right kidney   are too small to characterize. Excreted contrast material is seen in the   right renal collecting system.    BLADDER: Filled with excreted intravenous contrast material.  REPRODUCTIVE ORGANS: The prostate is within normal limits.    BOWEL: No bowel obstruction. Normal appendix.  PERITONEUM: No ascites.  VESSELS: Within normal limits.  RETROPERITONEUM: No lymphadenopathy.    ABDOMINAL WALL: Within normal limits.  BONES: Mild degenerative changes of bilateral hips.    IMPRESSION:     Stable large left renal subcapsular hematoma with associated perinephric   and retroperitoneal hemorrhage.     < end of copied text >    ----------------------------<  119<H>  3.9   |  24  |  1.15    Ca    8.5      08 Sep 2017 04:20    TPro  7.8  /  Alb  4.1  /  TBili  1.0  /  DBili  x   /  AST  19  /  ALT  25  /  AlkPhos  99  09-07

## 2017-09-08 NOTE — CONSULT NOTE ADULT - PROBLEM SELECTOR RECOMMENDATION 5
Improving. Likely reactive. Will monitor being w/u'd as outpt, c/w iron, likely related to above chronically, now with acute blood loss

## 2017-09-08 NOTE — CONSULT NOTE ADULT - SUBJECTIVE AND OBJECTIVE BOX
Patient is a 42y old  Male who presents with a chief complaint of LLQ pain, Left Renal, RP bleed (08 Sep 2017 00:17)      HPI:  43yo M with PMHx of Gullian-[place] in , CVA with bilateral upper extremity weakness, paraplegic who presents with LLQ pain x 24hrs prior to admission. The LLQ pain was intermittent, dull, radiating to the left flank and back associated with nausea and multiple episodes of vomiting, initially yellowish in color and blood-tinged in the end which has resolved. The pain came about while laying down, and he is non-ambulatory. Pt described pain as a 6-7/10 pain which has been controlled with morphine.   Last BM was . Pt states he usually goes every other day. Of note, he is urinating well without any issues. Denies hematuria, abdominal trauma, h/o kidney stones, and or dysuria. Pt lives home in [place] and has a home health aid. Pt also goes to a day program in [facility] Tuesday - Friday for about 5 hrs.     In ED vitals: Tm/c: 98.0, BP: 125/91, HR: 101-104, 02: 100%ra. Given 1L of NS, 8mg of Morphine IVSS (08 Sep 2017 00:17)    Allergies    apple (Vomiting)  penicillin (Hives)      HOME MEDICATIONS: [ x] Reviewed ([facility] home services called and most updates list was obtained which was from 2017)  ASA 81mg PO QD  Benzapril 5mg PO QD  Simvastatin 40mg QD  Ventolin HFA 2 puffs PRN SOB  Carvedilol 12.5mg 1 PO BID  Colace 100mg 1 PO BID      MEDICATIONS  (STANDING):  sodium chloride 0.9%. 1000 milliLiter(s) (100 mL/Hr) IV Continuous <Continuous>  docusate sodium 100 milliGRAM(s) Oral three times a day  simvastatin 10 milliGRAM(s) Oral at bedtime  carvedilol 6.25 milliGRAM(s) Oral every 12 hours  tamsulosin 0.4 milliGRAM(s) Oral at bedtime    MEDICATIONS  (PRN):  acetaminophen   Tablet. 650 milliGRAM(s) Oral every 6 hours PRN Mild Pain (1 - 3)  oxyCODONE    5 mG/acetaminophen 325 mG 1 Tablet(s) Oral every 4 hours PRN Moderate Pain  oxyCODONE    5 mG/acetaminophen 325 mG 2 Tablet(s) Oral every 6 hours PRN Severe Pain  ondansetron Injectable 4 milliGRAM(s) IV Push every 6 hours PRN Nausea and/or Vomiting  senna 2 Tablet(s) Oral at bedtime PRN Constipation  HYDROmorphone  Injectable 0.5 milliGRAM(s) IV Push every 3 hours PRN breakthrough severe pain      PAST MEDICAL & SURGICAL HISTORY:  CVA (cerebral vascular accident)  HLD (hyperlipidemia)  HTN (hypertension)  Guillain-[place] syndrome  Tracheostomy, acute management  [x ] Reviewed     SOCIAL HISTORY:  Residence: [ ] [facility]  [ ] [facility]  [ ] Community  Substance abuse: THC 1-2 times a year  Tobacco: denies  Alcohol use: occasional    FAMILY HISTORY:  Mom dies at 32 of unknown cancer  dad  at 56 from a fire    REVIEW OF SYSTEMS:    CONSTITUTIONAL: No fever, weight loss, or fatigue  EYES: No eye pain, visual disturbances, or discharge  ENMT:  No difficulty hearing, tinnitus, vertigo; No sinus or throat pain  NECK: No pain or stiffness  BREASTS: No pain, masses, or nipple discharge  RESPIRATORY: No cough, wheezing, chills or hemoptysis; No shortness of breath  CARDIOVASCULAR: No chest pain, palpitations, dizziness, or leg swelling  GASTROINTESTINAL: left lower abdominal pain with radiation to left flank; No nausea, vomiting, or hematemesis; hematemesis resolved; No diarrhea or constipation. No melena or hematochezia.  GENITOURINARY: No dysuria, frequency, hematuria, or incontinence  NEUROLOGICAL: No headaches, memory loss, los of upper and lower extremity weakness from residual stroke worse on right  SKIN: No itching, burning, rashes, or lesions   LYMPH NODES: No enlarged glands  ENDOCRINE: No heat or cold intolerance; No hair loss  MUSCULOSKELETAL: No muscle or back pain  PSYCHIATRIC: No depression, anxiety, mood swings, or difficulty sleeping  HEME/LYMPH: No easy bruising, or bleeding gums  ALLERGY AND IMMUNOLOGIC: No hives or eczema      Vital Signs Last 24 Hrs  T(C): 37.1 (08 Sep 2017 07:05), Max: 37.6 (08 Sep 2017 03:29)  T(F): 98.8 (08 Sep 2017 07:05), Max: 99.6 (08 Sep 2017 03:29)  HR: 98 (08 Sep 2017 07:05) (98 - 113)  BP: 112/75 (08 Sep 2017 07:05) (112/75 - 125/91)  BP(mean): --  RR: 16 (08 Sep 2017 07:05) (16 - 20)  SpO2: 97% (08 Sep 2017 07:05) (97% - 100%)    PHYSICAL EXAM:    GENERAL: NAD, well-groomed, well-developed  HEAD:  Atraumatic, Normocephalic  EYES: EOMI, PERRLA, conjunctiva and sclera clear, left eye deviated down and lateral compared to right as per pt residual fromstroke  ENMT: Moist mucous membranes  NECK: Supple, No JVD  RESPIRATORY: Clear to auscultation bilaterally; No rales, rhonchi, wheezing, or rubs  CARDIOVASCULAR: tachycardic rate and normal rhythm; No murmurs, rubs, or gallops  GASTROINTESTINAL: Soft,tender left lower quadrant, no rebound tenderness, mildly soft distended; Bowel sounds present  GENITOURINARY: normal external genitalia, circumcised no penile discharge  EXTREMITIES:  2+ Peripheral Pulses, No clubbing, cyanosis, or edema  NERVOUS SYSTEM:  Alert & Oriented X3; Moving all 4 extremities with residual weakness from stroke more so on right; No gross sensory deficits  HEME/LYMPH: No lymphadenopathy noted  SKIN: No rashes or lesions; Incisions C/D/I    LABS:                        12.0   12.04 )-----------( 250      ( 08 Sep 2017 04:20 )             38.7     -    139  |  101  |  19  ----------------------------<  119<H>  3.9   |  24  |  1.15    Ca    8.5      08 Sep 2017 04:20    TPro  7.8  /  Alb  4.1  /  TBili  1.0  /  DBili  x   /  AST  19  /  ALT  25  /  AlkPhos  99  09-07    PT/INR - ( 07 Sep 2017 19:22 )   PT: 11.5 SEC;   INR: 1.03          PTT - ( 07 Sep 2017 19:22 )  PTT:31.1 SEC  Urinalysis Basic - ( 07 Sep 2017 19:03 )    Color: YELLOW / Appearance: HAZY / S.030 / pH: 6.0  Gluc: NEGATIVE / Ketone: TRACE  / Bili: NEGATIVE / Urobili: NORMAL E.U.   Blood: MODERATE / Protein: 300 / Nitrite: NEGATIVE   Leuk Esterase: NEGATIVE / RBC: >50 / WBC 2-5   Sq Epi: x / Non Sq Epi: x / Bacteria: x      RADIOLOGY & ADDITIONAL STUDIES:    EKG:   not performed    Imaging:   Personally Reviewed:  [x ] YES               Consultant(s) notes reviewed:    Care Discussed with Consultant(s)/Other Providers: Urology regarding patients care    Advanced Directives: Full Code Patient is a 42y old  Male who presents with a chief complaint of LLQ pain, Left Renal, RP bleed (08 Sep 2017 00:17)      HPI:  42M uses WC, 24/7 HHA, [place] in , CVA () with residual R hemiparesis, who presents with L upper abm pain 24hrs prior to admission. Had acute onset of pain 930pm, radiating to the left flank and back associated with nausea and multiple episodes of vomiting, initially yellowish in color and blood-tinged in the end which has resolved. The pain came about while laying down, and he is non-ambulatory. Pt described pain as a 6-7/10 pain which has been controlled with morphine.  Last BM was Wed. Pt states he usually goes every other day. Of note, he is urinating well without any issues. Denies hematuria, abdominal trauma, h/o kidney stones, and or dysuria. Pt lives home in [place] and has a home health aid. Pt also goes to a day program in [facility] Tuesday - Friday for about 5 hrs.     In ED vitals: Tm/c: 98.0, BP: 125/91, HR: 101-104, 02: 100%ra. Given 1L of NS, 8mg of Morphine IVSS (08 Sep 2017 00:17)    Allergies:  apple (Vomiting)  penicillin (Hives)    HOME MEDICATIONS: [x] Reviewed ([facility] home services called and most updates list was obtained which was from 2017)  ASA 81mg PO QD  Simvastatin 40mg QD  Ventolin HFA 2 puffs PRN SOB  Carvedilol 12.5mg PO BID  Colace 100mg PO BID    MEDICATIONS  (STANDING):  sodium chloride 0.9%. 1000 milliLiter(s) (100 mL/Hr) IV Continuous <Continuous>  docusate sodium 100 milliGRAM(s) Oral three times a day  simvastatin 10 milliGRAM(s) Oral at bedtime  carvedilol 6.25 milliGRAM(s) Oral every 12 hours  tamsulosin 0.4 milliGRAM(s) Oral at bedtime    MEDICATIONS  (PRN):  acetaminophen   Tablet. 650 milliGRAM(s) Oral every 6 hours PRN Mild Pain (1 - 3)  oxyCODONE    5 mG/acetaminophen 325 mG 1 Tablet(s) Oral every 4 hours PRN Moderate Pain  oxyCODONE    5 mG/acetaminophen 325 mG 2 Tablet(s) Oral every 6 hours PRN Severe Pain  ondansetron Injectable 4 milliGRAM(s) IV Push every 6 hours PRN Nausea and/or Vomiting  senna 2 Tablet(s) Oral at bedtime PRN Constipation  HYDROmorphone  Injectable 0.5 milliGRAM(s) IV Push every 3 hours PRN breakthrough severe pain    PAST MEDICAL & SURGICAL HISTORY:  CVA (cerebral vascular accident)  HLD (hyperlipidemia)  HTN (hypertension)  Guillain-[place] syndrome  Tracheostomy, acute management    SOCIAL HISTORY:  Residence: lives alone, has  A  Substance abuse: THC 1-2 times a year  Tobacco: denies  Alcohol use: occasional    FAMILY HISTORY:  Mom dies at 32 of unknown cancer  dad  at 56 from a fire    REVIEW OF SYSTEMS:    CONSTITUTIONAL: No fever, weight loss, or fatigue  EYES: No eye pain, visual disturbances, or discharge  ENMT:  No difficulty hearing, tinnitus, vertigo; No sinus or throat pain  NECK: No pain or stiffness  BREASTS: No pain, masses, or nipple discharge  RESPIRATORY: No cough, wheezing, chills or hemoptysis; No shortness of breath  CARDIOVASCULAR: No chest pain, palpitations, dizziness, or leg swelling  GASTROINTESTINAL: left lower abdominal pain with radiation to left flank; No nausea, vomiting, or hematemesis; hematemesis resolved; No diarrhea or constipation. No melena or hematochezia.  GENITOURINARY: No dysuria, frequency, hematuria, or incontinence  NEUROLOGICAL: No headaches, memory loss, loss of upper and lower extremity weakness from residual stroke worse on right  SKIN: No itching, burning, rashes, or lesions   LYMPH NODES: No enlarged glands  ENDOCRINE: No heat or cold intolerance; No hair loss  MUSCULOSKELETAL: No muscle or back pain  PSYCHIATRIC: No depression, anxiety, mood swings, or difficulty sleeping  HEME/LYMPH: No easy bruising, or bleeding gums  ALLERGY AND IMMUNOLOGIC: No hives or eczema      Vital Signs Last 24 Hrs  T(C): 37.1 (08 Sep 2017 07:05), Max: 37.6 (08 Sep 2017 03:29)  T(F): 98.8 (08 Sep 2017 07:05), Max: 99.6 (08 Sep 2017 03:29)  HR: 98 (08 Sep 2017 07:05) (98 - 113)  BP: 112/75 (08 Sep 2017 07:05) (112/75 - 125/91)  RR: 16 (08 Sep 2017 07:05) (16 - 20)  SpO2: 97% (08 Sep 2017 07:05) (97% - 100%)    PHYSICAL EXAM:  GENERAL: NAD, well-groomed, well-developed  HEAD:  Atraumatic, Normocephalic  EYES: EOMI, PERRLA, conjunctiva and sclera clear, left eye deviated down and lateral compared to right as per pt residual fromstroke  ENMT: Moist mucous membranes  NECK: Supple, No JVD  RESPIRATORY: Clear to auscultation bilaterally; No rales, rhonchi, wheezing, or rubs  CARDIOVASCULAR: tachycardic rate and normal rhythm; No murmurs, rubs, or gallops  GASTROINTESTINAL: Soft,tender left lower quadrant, no rebound tenderness, mildly soft distended; Bowel sounds present  GENITOURINARY: normal external genitalia, circumcised no penile discharge  EXTREMITIES:  2+ Peripheral Pulses, No clubbing, cyanosis, or edema  NERVOUS SYSTEM:  Alert & Oriented X3; Moving all 4 extremities with residual weakness from stroke more so on right; No gross sensory deficits  HEME/LYMPH: No lymphadenopathy noted  SKIN: No rashes or lesions; Incisions C/D/I    LABS:             12.0   12.04 )-----------( 250      ( 08 Sep 2017 04:20 )             38.7     139  |  101  |  19  ----------------------------<  119<H>  3.9   |  24  |  1.15    Ca    8.5      08 Sep 2017 04:20    TPro  7.8  /  Alb  4.1  /  TBili  1.0  /  DBili  x   /  AST  19  /  ALT  25  /  AlkPhos  99  09-07    PT/INR - ( 07 Sep 2017 19:22 )   PT: 11.5 SEC;   INR: 1.03     PTT - ( 07 Sep 2017 19:22 )  PTT:31.1 SEC    Urinalysis Basic - ( 07 Sep 2017 19:03 )  Color: YELLOW / Appearance: HAZY / S.030 / pH: 6.0  Gluc: NEGATIVE / Ketone: TRACE  / Bili: NEGATIVE / Urobili: NORMAL E.U.   Blood: MODERATE / Protein: 300 / Nitrite: NEGATIVE   Leuk Esterase: NEGATIVE / RBC: >50 / WBC 2-5   Sq Epi: x / Non Sq Epi: x / Bacteria: x    RADIOLOGY & ADDITIONAL STUDIES:    EKG:   not performed    Imaging:   Personally Reviewed:  [x ] YES               Consultant(s) notes reviewed:    Care Discussed with Consultant(s)/Other Providers: Urology regarding patients care    Advanced Directives: Full Code Patient is a 42y old  Male who presents with a chief complaint of LLQ pain, Left Renal, RP bleed (08 Sep 2017 00:17)    HPI:  42M uses WC, 24/7 HHA, [place] in , CVA () with residual R hemiparesis, who presents with L upper abm pain 24hrs prior to admission. Had acute onset of pain 930pm, radiating to the left flank and back associated with nausea and multiple episodes of vomiting, initially yellowish in color and blood-tinged in the end which has resolved. The pain came about while laying down, and he is non-ambulatory. Pt described pain as a 6-7/10 pain which has been controlled with morphine.  Last BM was Wed. Pt states he usually goes every other day. Of note, he is urinating well without any issues. Denies hematuria, abdominal trauma, h/o kidney stones, and or dysuria. Pt lives home in [place] and has a home health aid. Pt also goes to a day program in [facility] Tuesday - Friday for about 5 hrs.     In ED vitals: Tm/c: 98.0, BP: 125/91, HR: 101-104, 02: 100%ra. Given 1L of NS, 8mg of Morphine IVSS (08 Sep 2017 00:17)    Allergies:  apple (Vomiting)  penicillin (Hives)    HOME MEDICATIONS: [x] Reviewed ([facility] home services called and most updates list was obtained which was from 2017)  ASA 81mg PO QD  Simvastatin 40mg QD  Ventolin HFA 2 puffs PRN SOB  Carvedilol 12.5mg PO BID  Colace 100mg PO BID    MEDICATIONS  (STANDING):  sodium chloride 0.9%. 1000 milliLiter(s) (100 mL/Hr) IV Continuous <Continuous>  docusate sodium 100 milliGRAM(s) Oral three times a day  simvastatin 10 milliGRAM(s) Oral at bedtime  carvedilol 6.25 milliGRAM(s) Oral every 12 hours  tamsulosin 0.4 milliGRAM(s) Oral at bedtime    MEDICATIONS  (PRN):  acetaminophen   Tablet. 650 milliGRAM(s) Oral every 6 hours PRN Mild Pain (1 - 3)  oxyCODONE    5 mG/acetaminophen 325 mG 1 Tablet(s) Oral every 4 hours PRN Moderate Pain  oxyCODONE    5 mG/acetaminophen 325 mG 2 Tablet(s) Oral every 6 hours PRN Severe Pain  ondansetron Injectable 4 milliGRAM(s) IV Push every 6 hours PRN Nausea and/or Vomiting  senna 2 Tablet(s) Oral at bedtime PRN Constipation  HYDROmorphone  Injectable 0.5 milliGRAM(s) IV Push every 3 hours PRN breakthrough severe pain    PAST MEDICAL & SURGICAL HISTORY:  CVA (cerebral vascular accident)  HLD (hyperlipidemia)  HTN (hypertension)  Guillain-[place] syndrome  Tracheostomy, acute management  per outpt records - being worked up for anemia, started on iron    SOCIAL HISTORY:  Residence: lives alone, has  [facility]  Substance abuse: THC 1-2 times a year  Tobacco: denies  Alcohol use: occasional    FAMILY HISTORY:  Mom dies at 32 of unknown cancer  dad  at 56 from a fire    REVIEW OF SYSTEMS:    CONSTITUTIONAL: No fever, weight loss, or fatigue  EYES: No eye pain, visual disturbances, or discharge  ENMT:  No difficulty hearing, tinnitus, vertigo; No sinus or throat pain  NECK: No pain or stiffness  BREASTS: No pain, masses, or nipple discharge  RESPIRATORY: No cough, wheezing, chills or hemoptysis; No shortness of breath  CARDIOVASCULAR: No chest pain, palpitations, dizziness, or leg swelling  GASTROINTESTINAL: left upper abdominal pain with radiation to left flank;  GENITOURINARY: No dysuria, frequency, hematuria, or incontinence  NEUROLOGICAL: No headaches, memory loss; loss of upper and lower extremity weakness from residual stroke worse on right; hand contractures, eats using strap  SKIN: No itching, burning, rashes, or lesions   LYMPH NODES: No enlarged glands  ENDOCRINE: No heat or cold intolerance; No hair loss  MUSCULOSKELETAL: No muscle or back pain  PSYCHIATRIC: No depression, anxiety, mood swings, or difficulty sleeping  HEME/LYMPH: No easy bruising, or bleeding gums  ALLERGY AND IMMUNOLOGIC: No hives or eczema    Vital Signs Last 24 Hrs  T(C): 37.1 (08 Sep 2017 07:05), Max: 37.6 (08 Sep 2017 03:29)  T(F): 98.8 (08 Sep 2017 07:05), Max: 99.6 (08 Sep 2017 03:29)  HR: 98 (08 Sep 2017 07:05) (98 - 113)  BP: 112/75 (08 Sep 2017 07:05) (112/75 - 125/91)  RR: 16 (08 Sep 2017 07:05) (16 - 20)  SpO2: 97% (08 Sep 2017 07:05) (97% - 100%)    PHYSICAL EXAM:  GENERAL: NAD, pleasant BM  HEAD:  Atraumatic, Normocephalic  EYES: EOMI, PERRLA, conjunctiva and sclera clear, disconjugate gaze  ENMT: Moist mucous membranes  NECK: Supple, No JVD  RESPIRATORY: Clear to auscultation bilaterally; No rales, rhonchi, wheezing, or rubs  CARDIOVASCULAR: tachycardic rate and normal rhythm; No murmurs, rubs, or gallops  GASTROINTESTINAL: Soft, tender left upper quadrant, no rebound tenderness, mildly soft distended; Bowel sounds present  GENITOURINARY: normal external genitalia, circumcised, no penile discharge  EXTREMITIES:  2+ Peripheral Pulses, No clubbing, cyanosis, or edema  NERVOUS SYSTEM:  Alert & Oriented X3; Moving all 4 extremities with residual weakness from stroke more so on right; No gross sensory deficits  HEME/LYMPH: No lymphadenopathy noted  SKIN: No rashes or lesions; Incisions C/D/I    LABS:             12.0   12.04 )-----------( 250      ( 08 Sep 2017 04:20 )             38.7     139  |  101  |  19  ----------------------------<  119<H>  3.9   |  24  |  1.15    Ca    8.5      08 Sep 2017 04:20    TPro  7.8  /  Alb  4.1  /  TBili  1.0  /  DBili  x   /  AST  19  /  ALT  25  /  AlkPhos  99  09-07    PT/INR - ( 07 Sep 2017 19:22 )   PT: 11.5 SEC;   INR: 1.03     PTT - ( 07 Sep 2017 19:22 )  PTT:31.1 SEC    Urinalysis Basic - ( 07 Sep 2017 19:03 )  Color: YELLOW / Appearance: HAZY / S.030 / pH: 6.0  Gluc: NEGATIVE / Ketone: TRACE  / Bili: NEGATIVE / Urobili: NORMAL E.U.   Blood: MODERATE / Protein: 300 / Nitrite: NEGATIVE   Leuk Esterase: NEGATIVE / RBC: >50 / WBC 2-5   Sq Epi: x / Non Sq Epi: x / Bacteria: x    RADIOLOGY & ADDITIONAL STUDIES:    EKG:   not performed    Imaging:   Personally Reviewed:  [x ] YES               Consultant(s) notes reviewed:    Care Discussed with Consultant(s)/Other Providers: Urology regarding patients care    Advanced Directives: Full Code

## 2017-09-08 NOTE — H&P ADULT - NSHPPHYSICALEXAM_GEN_ALL_CORE
Gen: AAOx3, NAD  Lungs: good airway movement, CTA b/l. No wheezing or rales.  CV: RRR, S1, S2  Abd: softly distended, +BS, mild LLQ tenderness w/ guarding. No rebound tenderness.  : Left CVAT. circ phallus, b/l descended testes-nontender. No urethral discharge.  Ext: LE brace, unable to ambulate. UE contractures.

## 2017-09-08 NOTE — CONSULT NOTE ADULT - PROBLEM SELECTOR RECOMMENDATION 4
Likely reactive. Continue to monitor. Will obtain a EKG. Likely reactive. Continue to monitor. Will obtain a EKG. --> increased back to home dose Coreg 12.5, f/u HR

## 2017-09-08 NOTE — CONSULT NOTE ADULT - PROBLEM SELECTOR RECOMMENDATION 9
Currently stable. Will continue to monitor H/H, patient vitals, and patient for any signs of acute blood loss. Follow up with Urology recommendations. Continue with pain medications as needed for pain. C/w with patient as NPO, c/w IVF. c/w home medications ASA held d/t bleeding

## 2017-09-08 NOTE — H&P ADULT - ASSESSMENT
41yo M with h/o Gullian-[place], HLD, CVA presents with Left lower quadrant pain and vomiting, found to have L renal subcapsular hematoma with possible underlying renal lesion with active extravasation vs. tumor vascularity, perinephric and RP hematoma.

## 2017-09-08 NOTE — PROGRESS NOTE ADULT - ASSESSMENT
43yo M with PMH of htn, hld, guillian barre, and CVA presents today with abdominal pain and vomiting and found to have L renal subcapsular hematoma with possible underlying renal lesion with active extravasation vs. tumor vascularity, perinephric and RP hematoma. Patient hemodynamically stable with hct of 45 in ED.     - care as per urology  - would do serial CBCs and place patient in a monitored setting, transfuse as needed and consider IR embolization if patient shows signs of bleeding  - follow up imaging per urology  -would refrain from antihypertensive medications  - please call general surgery with any questions or acute change in patient status    B Team Sx  [phone] 41yo M with PMH of htn, hld, guillian barre, and CVA presents today with abdominal pain and vomiting and found to have L renal subcapsular hematoma with possible underlying renal lesion with active extravasation vs. tumor vascularity, perinephric and RP hematoma. Patient hemodynamically stable with hct of 45 in ED.     - care as per urology  - would do serial CBCs and place patient in a monitored setting, transfuse as needed and consider IR embolization if patient shows signs of bleeding  - follow up imaging per urology  -would refrain from antihypertensive medications  -wound start bowel regimen while receiving narcotics  - please call general surgery with any questions or acute change in patient status    B Team Sx  [id number]

## 2017-09-08 NOTE — CONSULT NOTE ADULT - ASSESSMENT
43yo M with PMHx of Gullian-[place] in 2000, CVA with bilateral upper extremity weakness, paraplegic who presents with LLQ pain x 24hrs prior to admission. The LLQ pain was intermittent, dull, radiating to the left flank and back associated with nausea and multiple episodes of vomiting, initially yellowish in color and blood-tinged in the end which has resolved. The pain came about while laying down, and he is non-ambulatory. Pt described pain as a 6-7/10 pain which has been controlled with morphine.   Last BM was Weds. Pt states he usually goes every other day. Of note, he is urinating well without any issues. Denies hematuria, abdominal trauma, h/o kidney stones, and or dysuria. Pt lives home in [place] and has a home health aid. Pt also goes to a day program in [facility] Tuesday - Friday for about 5 hrs.  Patient found to have a 8.5cm x 6.6cm X 10cm left renal subscapular hematoma with suspicion for underlying renal lesion as well as a 5mm distal right ureter calculus and bilat subcentimeter non obstructing renal calculi. Repeat CT showed stable left renal subcapsular hematoma w/ associated perinephric and retroperitoneal hemorrhage. Pts H/H have been stable since admission. 41yo M with PMHx of Gullian-[place] in 2000, CVA with R hemiparesis, uses WC, 24/7 HHA, who presents with LLQ pain x 24hrs prior to admission. The LLQ pain was intermittent, dull, radiating to the left flank and back associated with nausea and multiple episodes of vomiting, initially yellowish in color and blood-tinged in the end which has resolved. The pain came about while laying down, and he is non-ambulatory. Pt described pain as a 6-7/10 pain which has been controlled with morphine.   Last BM was Weds. Pt states he usually goes every other day. Of note, he is urinating well without any issues. Denies hematuria, abdominal trauma, h/o kidney stones, and or dysuria. Patient found to have a 8.5cm x 6.6cm X 10cm left renal subscapular hematoma with suspicion for underlying renal lesion as well as a 5mm distal right ureter calculus and bilat subcentimeter non obstructing renal calculi. Repeat CT showed stable left renal subcapsular hematoma w/ associated perinephric and retroperitoneal hemorrhage. Pts H/H have been stable since admission.

## 2017-09-08 NOTE — PROGRESS NOTE ADULT - SUBJECTIVE AND OBJECTIVE BOX
Pt admitted with L. renal hematoma, pain; also has uret stone    Afeb 112/76  99  98%RA    Pt  c/o L. sided pain  Abd- soft NT ND; + flatus    Crits stable overnite  45 to 38.3 38.7    Voiding and straining urine Pt admitted with L. renal hematoma, pain; also has R 5mm distal uret stone and ag[place]in non-obst kidney stones    Afeb 112/76  99  98%RA    Pt  c/o L. sided pain  Abd- soft NT ND; + flatus    Crits stable overnite  45 to 38.3 38.7    Voiding and straining urine

## 2017-09-08 NOTE — H&P ADULT - PROBLEM SELECTOR PLAN 1
- Admit to Urology/ Dr. Hoenig  - Trend Hb/Hct  - Serial CBC Q6h  - Keep NPO  - MIVF hydration  - Bedrest  - F/U repeat CTAP within 3-4 hours  - Should he come unstable or worsening bleeding occurs, will consult IR for possible embolization of Left Kidney  - Discussed with Dr. Hoenig, Urology Attending

## 2017-09-09 DIAGNOSIS — K59.00 CONSTIPATION, UNSPECIFIED: ICD-10-CM

## 2017-09-09 LAB
BACTERIA UR CULT: SIGNIFICANT CHANGE UP
CK MB BLD-MCNC: 1.23 NG/ML — SIGNIFICANT CHANGE UP (ref 1–6.6)
CK SERPL-CCNC: 55 U/L — SIGNIFICANT CHANGE UP (ref 30–200)
HCT VFR BLD CALC: 29.1 % — LOW (ref 39–50)
HCT VFR BLD CALC: 29.1 % — LOW (ref 39–50)
HCT VFR BLD CALC: 29.2 % — LOW (ref 39–50)
HCT VFR BLD CALC: 30.8 % — LOW (ref 39–50)
HGB BLD-MCNC: 8.9 G/DL — LOW (ref 13–17)
HGB BLD-MCNC: 8.9 G/DL — LOW (ref 13–17)
HGB BLD-MCNC: 9 G/DL — LOW (ref 13–17)
HGB BLD-MCNC: 9.6 G/DL — LOW (ref 13–17)
MCHC RBC-ENTMCNC: 27.1 PG — SIGNIFICANT CHANGE UP (ref 27–34)
MCHC RBC-ENTMCNC: 27.2 PG — SIGNIFICANT CHANGE UP (ref 27–34)
MCHC RBC-ENTMCNC: 27.7 PG — SIGNIFICANT CHANGE UP (ref 27–34)
MCHC RBC-ENTMCNC: 27.9 PG — SIGNIFICANT CHANGE UP (ref 27–34)
MCHC RBC-ENTMCNC: 30.5 % — LOW (ref 32–36)
MCHC RBC-ENTMCNC: 30.6 % — LOW (ref 32–36)
MCHC RBC-ENTMCNC: 30.9 % — LOW (ref 32–36)
MCHC RBC-ENTMCNC: 31.2 % — LOW (ref 32–36)
MCV RBC AUTO: 87.3 FL — SIGNIFICANT CHANGE UP (ref 80–100)
MCV RBC AUTO: 89 FL — SIGNIFICANT CHANGE UP (ref 80–100)
MCV RBC AUTO: 90.1 FL — SIGNIFICANT CHANGE UP (ref 80–100)
MCV RBC AUTO: 90.7 FL — SIGNIFICANT CHANGE UP (ref 80–100)
NRBC # FLD: 0 — SIGNIFICANT CHANGE UP
PLATELET # BLD AUTO: 177 K/UL — SIGNIFICANT CHANGE UP (ref 150–400)
PLATELET # BLD AUTO: 185 K/UL — SIGNIFICANT CHANGE UP (ref 150–400)
PLATELET # BLD AUTO: 201 K/UL — SIGNIFICANT CHANGE UP (ref 150–400)
PLATELET # BLD AUTO: 219 K/UL — SIGNIFICANT CHANGE UP (ref 150–400)
PMV BLD: 10 FL — SIGNIFICANT CHANGE UP (ref 7–13)
PMV BLD: 9.9 FL — SIGNIFICANT CHANGE UP (ref 7–13)
RBC # BLD: 3.21 M/UL — LOW (ref 4.2–5.8)
RBC # BLD: 3.23 M/UL — LOW (ref 4.2–5.8)
RBC # BLD: 3.28 M/UL — LOW (ref 4.2–5.8)
RBC # BLD: 3.53 M/UL — LOW (ref 4.2–5.8)
RBC # FLD: 15.2 % — HIGH (ref 10.3–14.5)
RBC # FLD: 15.3 % — HIGH (ref 10.3–14.5)
RBC # FLD: 15.6 % — HIGH (ref 10.3–14.5)
RBC # FLD: 15.6 % — HIGH (ref 10.3–14.5)
SPECIMEN SOURCE: SIGNIFICANT CHANGE UP
TROPONIN T SERPL-MCNC: < 0.06 NG/ML — SIGNIFICANT CHANGE UP (ref 0–0.06)
WBC # BLD: 11.92 K/UL — HIGH (ref 3.8–10.5)
WBC # BLD: 12.25 K/UL — HIGH (ref 3.8–10.5)
WBC # BLD: 13.51 K/UL — HIGH (ref 3.8–10.5)
WBC # BLD: 13.52 K/UL — HIGH (ref 3.8–10.5)
WBC # FLD AUTO: 11.92 K/UL — HIGH (ref 3.8–10.5)
WBC # FLD AUTO: 12.25 K/UL — HIGH (ref 3.8–10.5)
WBC # FLD AUTO: 13.51 K/UL — HIGH (ref 3.8–10.5)
WBC # FLD AUTO: 13.52 K/UL — HIGH (ref 3.8–10.5)

## 2017-09-09 PROCEDURE — 99233 SBSQ HOSP IP/OBS HIGH 50: CPT

## 2017-09-09 RX ORDER — CARVEDILOL PHOSPHATE 80 MG/1
12.5 CAPSULE, EXTENDED RELEASE ORAL EVERY 12 HOURS
Qty: 0 | Refills: 0 | Status: DISCONTINUED | OUTPATIENT
Start: 2017-09-09 | End: 2017-09-12

## 2017-09-09 RX ORDER — SENNA PLUS 8.6 MG/1
2 TABLET ORAL AT BEDTIME
Qty: 0 | Refills: 0 | Status: DISCONTINUED | OUTPATIENT
Start: 2017-09-09 | End: 2017-09-12

## 2017-09-09 RX ORDER — POLYETHYLENE GLYCOL 3350 17 G/17G
17 POWDER, FOR SOLUTION ORAL DAILY
Qty: 0 | Refills: 0 | Status: DISCONTINUED | OUTPATIENT
Start: 2017-09-09 | End: 2017-09-10

## 2017-09-09 RX ADMIN — OXYCODONE AND ACETAMINOPHEN 2 TABLET(S): 5; 325 TABLET ORAL at 22:29

## 2017-09-09 RX ADMIN — OXYCODONE AND ACETAMINOPHEN 2 TABLET(S): 5; 325 TABLET ORAL at 22:09

## 2017-09-09 RX ADMIN — OXYCODONE AND ACETAMINOPHEN 2 TABLET(S): 5; 325 TABLET ORAL at 06:15

## 2017-09-09 RX ADMIN — SIMVASTATIN 40 MILLIGRAM(S): 20 TABLET, FILM COATED ORAL at 22:10

## 2017-09-09 RX ADMIN — SENNA PLUS 2 TABLET(S): 8.6 TABLET ORAL at 22:10

## 2017-09-09 RX ADMIN — OXYCODONE AND ACETAMINOPHEN 2 TABLET(S): 5; 325 TABLET ORAL at 06:45

## 2017-09-09 RX ADMIN — CARVEDILOL PHOSPHATE 12.5 MILLIGRAM(S): 80 CAPSULE, EXTENDED RELEASE ORAL at 13:53

## 2017-09-09 RX ADMIN — Medication 100 MILLIGRAM(S): at 22:10

## 2017-09-09 RX ADMIN — OXYCODONE AND ACETAMINOPHEN 2 TABLET(S): 5; 325 TABLET ORAL at 16:02

## 2017-09-09 RX ADMIN — TAMSULOSIN HYDROCHLORIDE 0.4 MILLIGRAM(S): 0.4 CAPSULE ORAL at 22:10

## 2017-09-09 RX ADMIN — Medication 100 MILLIGRAM(S): at 06:00

## 2017-09-09 RX ADMIN — Medication 100 MILLIGRAM(S): at 13:20

## 2017-09-09 RX ADMIN — Medication 325 MILLIGRAM(S): at 13:20

## 2017-09-09 RX ADMIN — OXYCODONE AND ACETAMINOPHEN 2 TABLET(S): 5; 325 TABLET ORAL at 17:00

## 2017-09-09 NOTE — PROGRESS NOTE ADULT - PROBLEM SELECTOR PLAN 4
no longer on ACEI, c/w Coreg, f/u BP. no longer on ACEI as outpt (says he bp was too low), c/w Coreg, f/u BP. colace, senna, miralax, f/u

## 2017-09-09 NOTE — PROGRESS NOTE ADULT - PROBLEM SELECTOR PLAN 3
likely partially reactive d/t acute blood loss  ECG with q waves inferiorly, pt denies h/o any heart problems, will try to get old records from PMD;  not been getting Coreg, will change hold parameters to SBP <100, f/u HR/BP closely - likely partially reactive d/t acute blood loss  - ECG with q waves inferiorly, pt denies h/o any heart problems, will try to get old records from PMD;  recommend check echo in case need surgical procedure  - not been getting Coreg, will change hold parameters to SBP <100, f/u HR/BP closely

## 2017-09-09 NOTE — PROGRESS NOTE ADULT - PROBLEM SELECTOR PLAN 6
Improving WBC and tachycardia. Follow up morning labs and urine culture. Monitor patient. Currently stable. Continue to monitor patient. C/w pain medication as needed. Follow up urology recommendations.

## 2017-09-09 NOTE — PROGRESS NOTE ADULT - SUBJECTIVE AND OBJECTIVE BOX
Patient is a 42y old  Male who presents with a chief complaint of LLQ pain, Left Renal, RP bleed (08 Sep 2017 00:17)    SUBJECTIVE / OVERNIGHT EVENTS:  In good spirits, hungry, wants to eat.  Abdominal pain controlled with meds.  Denies flatus;  no BM since Wed.  Urinating ok, denies pain/dysuria.    MEDICATIONS  (STANDING):  sodium chloride 0.9%. 1000 milliLiter(s) (100 mL/Hr) IV Continuous <Continuous>  docusate sodium 100 milliGRAM(s) Oral three times a day  tamsulosin 0.4 milliGRAM(s) Oral at bedtime  simvastatin 40 milliGRAM(s) Oral at bedtime  ferrous    sulfate 325 milliGRAM(s) Oral daily  carvedilol 12.5 milliGRAM(s) Oral every 12 hours  senna 2 Tablet(s) Oral at bedtime  polyethylene glycol 3350 17 Gram(s) Oral daily    MEDICATIONS  (PRN):  acetaminophen   Tablet. 650 milliGRAM(s) Oral every 6 hours PRN Mild Pain (1 - 3)  oxyCODONE    5 mG/acetaminophen 325 mG 1 Tablet(s) Oral every 4 hours PRN Moderate Pain  oxyCODONE    5 mG/acetaminophen 325 mG 2 Tablet(s) Oral every 6 hours PRN Severe Pain  ondansetron Injectable 4 milliGRAM(s) IV Push every 6 hours PRN Nausea and/or Vomiting  HYDROmorphone  Injectable 0.5 milliGRAM(s) IV Push every 3 hours PRN breakthrough severe pain  ALBUTerol    90 MICROgram(s) HFA Inhaler 2 Puff(s) Inhalation every 6 hours PRN Shortness of Breath and/or Wheezing    Vital Signs Last 24 Hrs  T(C): 37.1 (09 Sep 2017 10:30), Max: 37.1 (08 Sep 2017 21:14)  T(F): 98.7 (09 Sep 2017 10:30), Max: 98.7 (08 Sep 2017 21:14)  HR: 105 (09 Sep 2017 10:30) (99 - 105)  BP: 111/64 (09 Sep 2017 10:30) (101/65 - 111/64)  RR: 16 (09 Sep 2017 10:30) (16 - 16)  SpO2: 100% (09 Sep 2017 10:30) (96% - 100%)          PHYSICAL EXAM:  GENERAL: NAD, well-developed  HEAD:  Atraumatic, Normocephalic  EYES: EOMI, PERRLA, conjunctiva and sclera clear  NECK: Supple, No JVD  CHEST/LUNG: Clear to auscultation bilaterally; No wheeze  HEART: tachy, no murmur  ABDOMEN: Soft, dec BS, tender LUQ  EXTREMITIES:  2+ Peripheral Pulses, No clubbing, cyanosis, or edema  PSYCH: AAOx3  NEUROLOGY: contractures hands bilaterally, R HP  SKIN: No rashes or lesions    LABS:             8.9    11.92 )-----------( 177      ( 09 Sep 2017 12:20 )             29.1     139  |  101  |  19  ----------------------------<  119<H>  3.9   |  24  |  1.15    Ca    8.5      08 Sep 2017 04:20    TPro  7.8  /  Alb  4.1  /  TBili  1.0  /  DBili  x   /  AST  19  /  ALT  25  /  AlkPhos  99  09-07    PT/INR - ( 07 Sep 2017 19:22 )   PT: 11.5 SEC;   INR: 1.03     PTT - ( 07 Sep 2017 19:22 )  PTT:31.1 SEC    CARDIAC MARKERS ( 08 Sep 2017 22:50 )  x     / < 0.06 ng/mL / 55 u/L / 1.23 ng/mL / x      CARDIAC MARKERS ( 08 Sep 2017 04:20 )  x     / < 0.06 ng/mL / 76 u/L / 1.38 ng/mL / x        Urinalysis Basic - ( 07 Sep 2017 19:03 )  Color: YELLOW / Appearance: HAZY / S.030 / pH: 6.0  Gluc: NEGATIVE / Ketone: TRACE  / Bili: NEGATIVE / Urobili: NORMAL E.U.   Blood: MODERATE / Protein: 300 / Nitrite: NEGATIVE   Leuk Esterase: NEGATIVE / RBC: >50 / WBC 2-5   Sq Epi: x / Non Sq Epi: x / Bacteria: x    RADIOLOGY & ADDITIONAL TESTS:    Imaging Personally Reviewed:    Consultant(s) Notes Reviewed:      Care Discussed with Consultants/Other Providers: urology re overall care Patient is a 42y old  Male who presents with a chief complaint of LLQ pain, Left Renal, RP bleed (08 Sep 2017 00:17)    SUBJECTIVE / OVERNIGHT EVENTS:  In good spirits, hungry, wants to eat.  Abdominal pain controlled with meds.  Denies flatus;  no BM since Wed.  Urinating ok, denies pain/dysuria.    MEDICATIONS  (STANDING):  sodium chloride 0.9%. 1000 milliLiter(s) (100 mL/Hr) IV Continuous <Continuous>  docusate sodium 100 milliGRAM(s) Oral three times a day  tamsulosin 0.4 milliGRAM(s) Oral at bedtime  simvastatin 40 milliGRAM(s) Oral at bedtime  ferrous    sulfate 325 milliGRAM(s) Oral daily  carvedilol 12.5 milliGRAM(s) Oral every 12 hours  senna 2 Tablet(s) Oral at bedtime  polyethylene glycol 3350 17 Gram(s) Oral daily    MEDICATIONS  (PRN):  acetaminophen   Tablet. 650 milliGRAM(s) Oral every 6 hours PRN Mild Pain (1 - 3)  oxyCODONE    5 mG/acetaminophen 325 mG 1 Tablet(s) Oral every 4 hours PRN Moderate Pain  oxyCODONE    5 mG/acetaminophen 325 mG 2 Tablet(s) Oral every 6 hours PRN Severe Pain  ondansetron Injectable 4 milliGRAM(s) IV Push every 6 hours PRN Nausea and/or Vomiting  HYDROmorphone  Injectable 0.5 milliGRAM(s) IV Push every 3 hours PRN breakthrough severe pain  ALBUTerol    90 MICROgram(s) HFA Inhaler 2 Puff(s) Inhalation every 6 hours PRN Shortness of Breath and/or Wheezing    Vital Signs Last 24 Hrs  T(C): 37.1 (09 Sep 2017 10:30), Max: 37.1 (08 Sep 2017 21:14)  T(F): 98.7 (09 Sep 2017 10:30), Max: 98.7 (08 Sep 2017 21:14)  HR: 105 (09 Sep 2017 10:30) (99 - 105)  BP: 111/64 (09 Sep 2017 10:30) (101/65 - 111/64)  RR: 16 (09 Sep 2017 10:30) (16 - 16)  SpO2: 100% (09 Sep 2017 10:30) (96% - 100%)    PHYSICAL EXAM:  GENERAL: NAD, well-developed  HEAD:  Atraumatic, Normocephalic  EYES: EOMI, PERRLA, conjunctiva and sclera clear  NECK: Supple, No JVD  CHEST/LUNG: Clear to auscultation bilaterally; No wheeze  HEART: tachy, no murmur  ABDOMEN: Soft, dec BS, tender LUQ  EXTREMITIES:  2+ Peripheral Pulses, No clubbing, cyanosis, or edema  PSYCH: AAOx3  NEUROLOGY: contractures hands bilaterally, R HP  SKIN: No rashes or lesions    LABS:             8.9    11.92 )-----------( 177      ( 09 Sep 2017 12:20 )             29.1     139  |  101  |  19  ----------------------------<  119<H>  3.9   |  24  |  1.15    Ca    8.5      08 Sep 2017 04:20    TPro  7.8  /  Alb  4.1  /  TBili  1.0  /  DBili  x   /  AST  19  /  ALT  25  /  AlkPhos  99  09-07    PT/INR - ( 07 Sep 2017 19:22 )   PT: 11.5 SEC;   INR: 1.03     PTT - ( 07 Sep 2017 19:22 )  PTT:31.1 SEC    CARDIAC MARKERS ( 08 Sep 2017 22:50 )  x     / < 0.06 ng/mL / 55 u/L / 1.23 ng/mL / x      CARDIAC MARKERS ( 08 Sep 2017 04:20 )  x     / < 0.06 ng/mL / 76 u/L / 1.38 ng/mL / x        Urinalysis Basic - ( 07 Sep 2017 19:03 )  Color: YELLOW / Appearance: HAZY / S.030 / pH: 6.0  Gluc: NEGATIVE / Ketone: TRACE  / Bili: NEGATIVE / Urobili: NORMAL E.U.   Blood: MODERATE / Protein: 300 / Nitrite: NEGATIVE   Leuk Esterase: NEGATIVE / RBC: >50 / WBC 2-5   Sq Epi: x / Non Sq Epi: x / Bacteria: x    RADIOLOGY & ADDITIONAL TESTS:    Imaging Personally Reviewed:    Consultant(s) Notes Reviewed:      Care Discussed with Consultants/Other Providers: urology re overall care

## 2017-09-09 NOTE — PROGRESS NOTE ADULT - ASSESSMENT
41yo M with PMHx of [place] in 2000, CVA with R hemiparesis, uses WC, 24/7 HHA, who presents with LLQ pain x 24hrs prior to admission. The LLQ pain was intermittent, dull, radiating to the left flank and back associated with nausea and multiple episodes of vomiting, initially yellowish in color and blood-tinged in the end which has resolved.   Last BM was Weds. Pt states he usually goes every other day. Patient found to have a 8.5cm x 6.6cm X 10cm left renal subscapular hematoma with suspicion for underlying renal lesion as well as a 5mm distal right ureter calculus and bilat subcentimeter non obstructing renal calculi. Repeat CT showed stable left renal subcapsular hematoma w/ associated perinephric and retroperitoneal hemorrhage. 43yo M with PMHx of [place]ian-[place] in 2000, CVA with R hemiparesis, uses WC, 24/7 HHA, who presents with LLQ pain x 24hrs prior to admission. The LLQ pain was intermittent, dull, radiating to the left flank and back associated with nausea and multiple episodes of vomiting. Last BM was Wed, he usually goes every other day. Patient found to have a 8.5cm x 6.6cm X 10cm left renal subscapular hematoma with suspicion for underlying renal lesion as well as a 5mm distal right ureter calculus and bilat subcentimeter non obstructing renal calculi. Repeat CT showed stable left renal subcapsular hematoma w/ associated perinephric and retroperitoneal hemorrhage.

## 2017-09-09 NOTE — PROGRESS NOTE ADULT - PROBLEM SELECTOR PLAN 7
Likely reactive. Will monitor. Improving WBC and tachycardia. Follow up morning labs and urine culture. Monitor patient.

## 2017-09-09 NOTE — PROGRESS NOTE ADULT - PROBLEM SELECTOR PLAN 1
Per Urology - IR do not feel embolization indicated at this time, Monitoring H/H, patient vitals, and patient for any signs of acute blood loss closely. Continue with pain medications as needed for pain. C/w with patient as NPO, c/w IVF.  - would keep Hb >8

## 2017-09-09 NOTE — DOWNTIME INTERRUPTION NOTE - WHICH MANUAL FORMS INITIATED?
See paper chart for clinical documentation recorded during the downtime.  VS, I/O flowsheet  MAR  A/I flowsheet

## 2017-09-09 NOTE — PROGRESS NOTE ADULT - PROBLEM SELECTOR PLAN 5
Currently stable. Continue to monitor patient. C/w pain medication as needed. Follow up urology recommendations. no longer on ACEI as outpt (says he bp was too low), c/w Coreg, f/u BP.

## 2017-09-10 LAB
BLD GP AB SCN SERPL QL: NEGATIVE — SIGNIFICANT CHANGE UP
HCT VFR BLD CALC: 24.6 % — LOW (ref 39–50)
HCT VFR BLD CALC: 33 % — LOW (ref 39–50)
HGB BLD-MCNC: 10.6 G/DL — LOW (ref 13–17)
HGB BLD-MCNC: 7.7 G/DL — LOW (ref 13–17)
MCHC RBC-ENTMCNC: 27.5 PG — SIGNIFICANT CHANGE UP (ref 27–34)
MCHC RBC-ENTMCNC: 28.6 PG — SIGNIFICANT CHANGE UP (ref 27–34)
MCHC RBC-ENTMCNC: 31.3 % — LOW (ref 32–36)
MCHC RBC-ENTMCNC: 32.1 % — SIGNIFICANT CHANGE UP (ref 32–36)
MCV RBC AUTO: 87.9 FL — SIGNIFICANT CHANGE UP (ref 80–100)
MCV RBC AUTO: 88.9 FL — SIGNIFICANT CHANGE UP (ref 80–100)
NRBC # FLD: 0 — SIGNIFICANT CHANGE UP
NRBC # FLD: 0 — SIGNIFICANT CHANGE UP
PLATELET # BLD AUTO: 184 K/UL — SIGNIFICANT CHANGE UP (ref 150–400)
PLATELET # BLD AUTO: 185 K/UL — SIGNIFICANT CHANGE UP (ref 150–400)
PMV BLD: 9.4 FL — SIGNIFICANT CHANGE UP (ref 7–13)
PMV BLD: 9.6 FL — SIGNIFICANT CHANGE UP (ref 7–13)
RBC # BLD: 2.8 M/UL — LOW (ref 4.2–5.8)
RBC # BLD: 3.71 M/UL — LOW (ref 4.2–5.8)
RBC # FLD: 14.7 % — HIGH (ref 10.3–14.5)
RBC # FLD: 15.2 % — HIGH (ref 10.3–14.5)
RH IG SCN BLD-IMP: POSITIVE — SIGNIFICANT CHANGE UP
WBC # BLD: 10.4 K/UL — SIGNIFICANT CHANGE UP (ref 3.8–10.5)
WBC # BLD: 9.05 K/UL — SIGNIFICANT CHANGE UP (ref 3.8–10.5)
WBC # FLD AUTO: 10.4 K/UL — SIGNIFICANT CHANGE UP (ref 3.8–10.5)
WBC # FLD AUTO: 9.05 K/UL — SIGNIFICANT CHANGE UP (ref 3.8–10.5)

## 2017-09-10 PROCEDURE — 99233 SBSQ HOSP IP/OBS HIGH 50: CPT

## 2017-09-10 RX ORDER — POLYETHYLENE GLYCOL 3350 17 G/17G
17 POWDER, FOR SOLUTION ORAL
Qty: 0 | Refills: 0 | Status: DISCONTINUED | OUTPATIENT
Start: 2017-09-10 | End: 2017-09-12

## 2017-09-10 RX ADMIN — SIMVASTATIN 40 MILLIGRAM(S): 20 TABLET, FILM COATED ORAL at 21:06

## 2017-09-10 RX ADMIN — OXYCODONE AND ACETAMINOPHEN 2 TABLET(S): 5; 325 TABLET ORAL at 05:54

## 2017-09-10 RX ADMIN — SENNA PLUS 2 TABLET(S): 8.6 TABLET ORAL at 21:06

## 2017-09-10 RX ADMIN — Medication 100 MILLIGRAM(S): at 21:06

## 2017-09-10 RX ADMIN — Medication 100 MILLIGRAM(S): at 05:56

## 2017-09-10 RX ADMIN — CARVEDILOL PHOSPHATE 12.5 MILLIGRAM(S): 80 CAPSULE, EXTENDED RELEASE ORAL at 05:56

## 2017-09-10 RX ADMIN — OXYCODONE AND ACETAMINOPHEN 2 TABLET(S): 5; 325 TABLET ORAL at 06:24

## 2017-09-10 RX ADMIN — Medication 100 MILLIGRAM(S): at 13:11

## 2017-09-10 RX ADMIN — OXYCODONE AND ACETAMINOPHEN 2 TABLET(S): 5; 325 TABLET ORAL at 13:10

## 2017-09-10 RX ADMIN — Medication 325 MILLIGRAM(S): at 13:10

## 2017-09-10 RX ADMIN — TAMSULOSIN HYDROCHLORIDE 0.4 MILLIGRAM(S): 0.4 CAPSULE ORAL at 21:06

## 2017-09-10 RX ADMIN — OXYCODONE AND ACETAMINOPHEN 2 TABLET(S): 5; 325 TABLET ORAL at 21:05

## 2017-09-10 RX ADMIN — OXYCODONE AND ACETAMINOPHEN 2 TABLET(S): 5; 325 TABLET ORAL at 21:35

## 2017-09-10 RX ADMIN — SODIUM CHLORIDE 100 MILLILITER(S): 9 INJECTION INTRAMUSCULAR; INTRAVENOUS; SUBCUTANEOUS at 05:54

## 2017-09-10 RX ADMIN — OXYCODONE AND ACETAMINOPHEN 2 TABLET(S): 5; 325 TABLET ORAL at 14:08

## 2017-09-10 RX ADMIN — CARVEDILOL PHOSPHATE 12.5 MILLIGRAM(S): 80 CAPSULE, EXTENDED RELEASE ORAL at 17:37

## 2017-09-10 NOTE — PROGRESS NOTE ADULT - PROBLEM SELECTOR PLAN 1
Per Urology - IR team following for possible embolization, Monitoring H/H, patient vitals, and patient for any signs of acute blood loss closely. Continue with pain medications as needed for pain. C/w with patient as NPO, c/w IVF.  - would keep Hb >8

## 2017-09-10 NOTE — PROGRESS NOTE ADULT - ASSESSMENT
41yo M with PMHx of [place] in 2000, CVA with R hemiparesis, uses WC, 24/7 HHA, who presents with LLQ pain x 24hrs prior to admission. The LLQ pain was intermittent, dull, radiating to the left flank and back associated with nausea and multiple episodes of vomiting. Last BM was Wed, he usually goes every other day. Patient found to have a 8.5cm x 6.6cm X 10cm left renal subscapular hematoma with suspicion for underlying renal lesion as well as a 5mm distal right ureter calculus and bilat subcentimeter non obstructing renal calculi. Repeat CT showed stable left renal subcapsular hematoma w/ associated perinephric and retroperitoneal hemorrhage.

## 2017-09-10 NOTE — PROGRESS NOTE ADULT - SUBJECTIVE AND OBJECTIVE BOX
Progress Note    Subjective  No acute event overnight. Stable Hct x 3. NPO past midnight.  No pain.    Objective  Afebrile,  HR: 96, BP: 104/63 SpO2: 100% (RA)   Void: 330 cc     Gen  Abd      Diet/Fluids  NPo past midnight, NS @ 100     Labs  WBC:  13.51  HCT: 29.1 (from 29.1)  Cr: 1.15 No acute event overnight. Stable Hct x 3. NPO past midnight.  No pain.  Spoke to radiology, recommending patient gets blood and will assess whether needs intervention based on response to blood, and they will document a note.     Afebrile,  HR: 96, BP: 104/63 SpO2: 100% (RA)   Void: 330 cc     Gen NAD  Abd Soft, NT, ND    Diet/Fluids  NPo past midnight, NS @ 100     Labs  WBC:  13.51  HCT: 29.1 (from 29.1)  Cr: 1.15 No acute event overnight. Stable Hct x 3, but dropped from 29->24 overnight. NPO past midnight.  No pain.  Spoke to radiology, recommending patient gets blood and will assess whether needs intervention based on response to blood, and they will document a note.     Afebrile,  HR: 96, BP: 104/63 SpO2: 100% (RA)   Void: 330 cc     Gen NAD  Abd Soft, NT, ND    Diet/Fluids  NPO past midnight, NS @ 100     Labs  WBC:  13.51  HCT: 29.1 (from 29.1)  Cr: 1.15

## 2017-09-10 NOTE — PROGRESS NOTE ADULT - ASSESSMENT
3yo M with PMHx of [place] in 2000, CVA with R hemiparesis, uses WC, 24/7 HHA, who presents with LLQ pain x 24hrs prior to admission.

## 2017-09-10 NOTE — PROGRESS NOTE ADULT - PROBLEM SELECTOR PLAN 3
- likely partially reactive d/t acute blood loss  - ECG with q waves inferiorly, pt denies h/o any heart problems, will try to get old records from PMD;  recommend check echo in case need surgical procedure  - not been getting Coreg, will change hold parameters to SBP <100, f/u HR/BP closely

## 2017-09-10 NOTE — PROGRESS NOTE ADULT - PROBLEM SELECTOR PLAN 1
-AM CBC  -Trend Hct  -Bedrest  -manage GORDON  -Flomax  -f/u medicine -Trend Hct  -Bedrest  -Flomax  -f/u medicine  -NPOpMN  -Follow up radiology -Trend Hct  -Bedrest  -Flomax  -f/u medicine  -NPOpMN  -Follow up radiology  -2U PRBCs  -Post-Tx CBC

## 2017-09-10 NOTE — PROGRESS NOTE ADULT - PROBLEM SELECTOR PLAN 6
Currently stable. Continue to monitor patient. C/w pain medication as needed. Follow up urology recommendations.

## 2017-09-10 NOTE — PROGRESS NOTE ADULT - SUBJECTIVE AND OBJECTIVE BOX
Patient is a 42y old  Male who presents with a chief complaint of LLQ pain, Left Renal, RP bleed (08 Sep 2017 00:17)    SUBJECTIVE / OVERNIGHT EVENTS:  Doing ok.  Pain about the same.  No chest pain, SOB.  Hungry, wants to eat.  No BM since Wed.      MEDICATIONS  (STANDING):  docusate sodium 100 milliGRAM(s) Oral three times a day  tamsulosin 0.4 milliGRAM(s) Oral at bedtime  simvastatin 40 milliGRAM(s) Oral at bedtime  ferrous    sulfate 325 milliGRAM(s) Oral daily  carvedilol 12.5 milliGRAM(s) Oral every 12 hours  senna 2 Tablet(s) Oral at bedtime  polyethylene glycol 3350 17 Gram(s) Oral daily    MEDICATIONS  (PRN):  acetaminophen   Tablet. 650 milliGRAM(s) Oral every 6 hours PRN Mild Pain (1 - 3)  oxyCODONE    5 mG/acetaminophen 325 mG 1 Tablet(s) Oral every 4 hours PRN Moderate Pain  oxyCODONE    5 mG/acetaminophen 325 mG 2 Tablet(s) Oral every 6 hours PRN Severe Pain  ondansetron Injectable 4 milliGRAM(s) IV Push every 6 hours PRN Nausea and/or Vomiting  HYDROmorphone  Injectable 0.5 milliGRAM(s) IV Push every 3 hours PRN breakthrough severe pain  ALBUTerol    90 MICROgram(s) HFA Inhaler 2 Puff(s) Inhalation every 6 hours PRN Shortness of Breath and/or Wheezing    Vital Signs Last 24 Hrs  T(C): 36.9 (10 Sep 2017 10:00), Max: 37.1 (10 Sep 2017 09:45)  T(F): 98.4 (10 Sep 2017 10:00), Max: 98.7 (10 Sep 2017 09:45)  HR: 102 (10 Sep 2017 10:00) (96 - 103)  BP: 109/73 (10 Sep 2017 10:00) (100/58 - 111/61)  RR: 16 (10 Sep 2017 10:00) (16 - 18)  SpO2: 100% (10 Sep 2017 10:00) (98% - 100%)    PHYSICAL EXAM:  GENERAL: NAD, well-developed  HEAD:  Atraumatic, Normocephalic  EYES: EOMI, PERRLA, conjunctiva and sclera clear  NECK: Supple, No JVD  CHEST/LUNG: Clear to auscultation bilaterally; No wheeze  HEART: tachy, no murmur  ABDOMEN: Soft, dec BS, tender LUQ  EXTREMITIES:  2+ Peripheral Pulses, No clubbing, cyanosis, or edema  PSYCH: AAOx3  NEUROLOGY: contractures hands bilaterally, R HP, disconjugate gaze  SKIN: No rashes or lesions    LABS:             7.7    9.05  )-----------( 185      ( 10 Sep 2017 06:43 )             24.6     CARDIAC MARKERS ( 08 Sep 2017 22:50 )  x     / < 0.06 ng/mL / 55 u/L / 1.23 ng/mL / x        RADIOLOGY & ADDITIONAL TESTS:    Imaging Personally Reviewed:    Consultant(s) Notes Reviewed:      Care Discussed with Consultants/Other Providers: urology re overall care

## 2017-09-11 LAB
BUN SERPL-MCNC: 16 MG/DL — SIGNIFICANT CHANGE UP (ref 7–23)
BUN SERPL-MCNC: 16 MG/DL — SIGNIFICANT CHANGE UP (ref 7–23)
CALCIUM SERPL-MCNC: 8.1 MG/DL — LOW (ref 8.4–10.5)
CALCIUM SERPL-MCNC: 8.3 MG/DL — LOW (ref 8.4–10.5)
CHLORIDE SERPL-SCNC: 104 MMOL/L — SIGNIFICANT CHANGE UP (ref 98–107)
CHLORIDE SERPL-SCNC: 104 MMOL/L — SIGNIFICANT CHANGE UP (ref 98–107)
CO2 SERPL-SCNC: 23 MMOL/L — SIGNIFICANT CHANGE UP (ref 22–31)
CO2 SERPL-SCNC: 24 MMOL/L — SIGNIFICANT CHANGE UP (ref 22–31)
CREAT SERPL-MCNC: 1.11 MG/DL — SIGNIFICANT CHANGE UP (ref 0.5–1.3)
CREAT SERPL-MCNC: 1.32 MG/DL — HIGH (ref 0.5–1.3)
GLUCOSE SERPL-MCNC: 110 MG/DL — HIGH (ref 70–99)
GLUCOSE SERPL-MCNC: 91 MG/DL — SIGNIFICANT CHANGE UP (ref 70–99)
HCT VFR BLD CALC: 31.3 % — LOW (ref 39–50)
HCT VFR BLD CALC: 31.4 % — LOW (ref 39–50)
HGB BLD-MCNC: 10 G/DL — LOW (ref 13–17)
HGB BLD-MCNC: 9.9 G/DL — LOW (ref 13–17)
MCHC RBC-ENTMCNC: 27.6 PG — SIGNIFICANT CHANGE UP (ref 27–34)
MCHC RBC-ENTMCNC: 27.7 PG — SIGNIFICANT CHANGE UP (ref 27–34)
MCHC RBC-ENTMCNC: 31.6 % — LOW (ref 32–36)
MCHC RBC-ENTMCNC: 31.8 % — LOW (ref 32–36)
MCV RBC AUTO: 86.7 FL — SIGNIFICANT CHANGE UP (ref 80–100)
MCV RBC AUTO: 87.4 FL — SIGNIFICANT CHANGE UP (ref 80–100)
NRBC # FLD: 0 — SIGNIFICANT CHANGE UP
NRBC # FLD: 0 — SIGNIFICANT CHANGE UP
PLATELET # BLD AUTO: 208 K/UL — SIGNIFICANT CHANGE UP (ref 150–400)
PLATELET # BLD AUTO: 212 K/UL — SIGNIFICANT CHANGE UP (ref 150–400)
PMV BLD: 9.4 FL — SIGNIFICANT CHANGE UP (ref 7–13)
PMV BLD: 9.9 FL — SIGNIFICANT CHANGE UP (ref 7–13)
POTASSIUM SERPL-MCNC: 4.1 MMOL/L — SIGNIFICANT CHANGE UP (ref 3.5–5.3)
POTASSIUM SERPL-MCNC: 4.1 MMOL/L — SIGNIFICANT CHANGE UP (ref 3.5–5.3)
POTASSIUM SERPL-SCNC: 4.1 MMOL/L — SIGNIFICANT CHANGE UP (ref 3.5–5.3)
POTASSIUM SERPL-SCNC: 4.1 MMOL/L — SIGNIFICANT CHANGE UP (ref 3.5–5.3)
RBC # BLD: 3.58 M/UL — LOW (ref 4.2–5.8)
RBC # BLD: 3.62 M/UL — LOW (ref 4.2–5.8)
RBC # FLD: 14.7 % — HIGH (ref 10.3–14.5)
RBC # FLD: 14.8 % — HIGH (ref 10.3–14.5)
SODIUM SERPL-SCNC: 140 MMOL/L — SIGNIFICANT CHANGE UP (ref 135–145)
SODIUM SERPL-SCNC: 140 MMOL/L — SIGNIFICANT CHANGE UP (ref 135–145)
WBC # BLD: 11.19 K/UL — HIGH (ref 3.8–10.5)
WBC # BLD: 8.99 K/UL — SIGNIFICANT CHANGE UP (ref 3.8–10.5)
WBC # FLD AUTO: 11.19 K/UL — HIGH (ref 3.8–10.5)
WBC # FLD AUTO: 8.99 K/UL — SIGNIFICANT CHANGE UP (ref 3.8–10.5)

## 2017-09-11 PROCEDURE — 99233 SBSQ HOSP IP/OBS HIGH 50: CPT

## 2017-09-11 PROCEDURE — 76937 US GUIDE VASCULAR ACCESS: CPT | Mod: 26

## 2017-09-11 PROCEDURE — 36251 INS CATH REN ART 1ST UNILAT: CPT | Mod: LT

## 2017-09-11 RX ORDER — SODIUM CHLORIDE 9 MG/ML
1000 INJECTION, SOLUTION INTRAVENOUS
Qty: 0 | Refills: 0 | Status: DISCONTINUED | OUTPATIENT
Start: 2017-09-11 | End: 2017-09-12

## 2017-09-11 RX ADMIN — OXYCODONE AND ACETAMINOPHEN 2 TABLET(S): 5; 325 TABLET ORAL at 07:18

## 2017-09-11 RX ADMIN — OXYCODONE AND ACETAMINOPHEN 2 TABLET(S): 5; 325 TABLET ORAL at 17:40

## 2017-09-11 RX ADMIN — CARVEDILOL PHOSPHATE 12.5 MILLIGRAM(S): 80 CAPSULE, EXTENDED RELEASE ORAL at 06:47

## 2017-09-11 RX ADMIN — OXYCODONE AND ACETAMINOPHEN 2 TABLET(S): 5; 325 TABLET ORAL at 16:49

## 2017-09-11 RX ADMIN — CARVEDILOL PHOSPHATE 12.5 MILLIGRAM(S): 80 CAPSULE, EXTENDED RELEASE ORAL at 17:10

## 2017-09-11 RX ADMIN — Medication 100 MILLIGRAM(S): at 06:46

## 2017-09-11 RX ADMIN — SODIUM CHLORIDE 75 MILLILITER(S): 9 INJECTION, SOLUTION INTRAVENOUS at 07:27

## 2017-09-11 RX ADMIN — OXYCODONE AND ACETAMINOPHEN 2 TABLET(S): 5; 325 TABLET ORAL at 23:00

## 2017-09-11 RX ADMIN — Medication 100 MILLIGRAM(S): at 22:31

## 2017-09-11 RX ADMIN — TAMSULOSIN HYDROCHLORIDE 0.4 MILLIGRAM(S): 0.4 CAPSULE ORAL at 22:31

## 2017-09-11 RX ADMIN — Medication 10 MILLIGRAM(S): at 06:47

## 2017-09-11 RX ADMIN — OXYCODONE AND ACETAMINOPHEN 2 TABLET(S): 5; 325 TABLET ORAL at 06:48

## 2017-09-11 RX ADMIN — POLYETHYLENE GLYCOL 3350 17 GRAM(S): 17 POWDER, FOR SOLUTION ORAL at 06:47

## 2017-09-11 RX ADMIN — SIMVASTATIN 40 MILLIGRAM(S): 20 TABLET, FILM COATED ORAL at 22:31

## 2017-09-11 RX ADMIN — SENNA PLUS 2 TABLET(S): 8.6 TABLET ORAL at 22:31

## 2017-09-11 RX ADMIN — OXYCODONE AND ACETAMINOPHEN 2 TABLET(S): 5; 325 TABLET ORAL at 22:31

## 2017-09-11 RX ADMIN — POLYETHYLENE GLYCOL 3350 17 GRAM(S): 17 POWDER, FOR SOLUTION ORAL at 17:10

## 2017-09-11 NOTE — PROGRESS NOTE ADULT - ATTENDING COMMENTS
AS above, I discussed the case with Dr. Gordon yesterday approximately noon when first consulted and reviewed the CT scans performed 9/7 and 9/8 demonstrating left subcapsular hematoma with possible underlying mass.  Although the initial scan was interpreted as extravasation, it appeared to me that the enhancing high-attenuation seen may also represent capsular branches associated with the mass.  As the decision was made to treat this process conservatively upon initial presentation, and the patient was relatively stable, I suggested to continue conservative management and begin transfusing the patient as they had not received any PRBCs at that time given the size of the hematoma.  I called Dr. Gordon later in the day to see if there was any disagreement with this approach from the urology team and he stated they thought this approach was acceptable.  I was called this morning at 7:45 AM and Dr. Briggs informed me that the patient has had another drop in their hematocrit overnight and when I inquired about whether the patient had yet to received any PRBCs he informed me that they had not. Given the patient remained hemodynamically stable I reiterated my initial suggestion which was to resuscitate the patient with PRBCs and if he they did not respond to this then angiography would be performed at any time if it seemed the patient's status was worsening.  If the patient remains stable then I will sign out this case to the IR team at [facility] for consideration of elective angiography and possible intervention.
Pt seen by Dr. Durham today.  Plan for IR evaluation/embolization given ongoing bleeding and transfusion requirement.
Pt seen/examined.  Case discussed with housestaff/PA team.  Agree with above note history, physical and assessment/plan.
Patient seen and examined by me. Case discussed with fellow and agree with the fellow's findings and plan as documented in the note.  -f/u 2D echo  -f/u CBC q6hrs  -monitor bowel movement as patient has not had a bowel movement for the past 5 days -- but of note, patient has not eaten much since and doesn't feel constipated he reports.

## 2017-09-11 NOTE — PROGRESS NOTE ADULT - PROBLEM SELECTOR PLAN 1
Per Urology - IR team following for possible angio and embolization today, Monitoring H/H, patient vitals, and patient for any signs of acute blood loss closely. Continue with pain medications as needed for pain. C/w with patient as NPO, c/w IVF.  - would keep Hb >8

## 2017-09-11 NOTE — PROGRESS NOTE ADULT - SUBJECTIVE AND OBJECTIVE BOX
Afeb 117/76  92  99%RA    Pt has no c/o    Abd- soft NT ND   Void 500 cc    Recieved 2UPC over weekend  Crit now 31.3

## 2017-09-11 NOTE — PROGRESS NOTE ADULT - SUBJECTIVE AND OBJECTIVE BOX
Post IR check  Afeb 113/75 100 100%RA  Pt c/o pain  Abd- soft NT ND   R. groin dressing C&D; no hematoma    Void 600  No bleed found during renal angio

## 2017-09-11 NOTE — PROGRESS NOTE ADULT - SUBJECTIVE AND OBJECTIVE BOX
Patient is a 42y old  Male who presented with a chief complaint of LLQ pain, Left Renal, RP bleed (08 Sep 2017 00:17)    SUBJECTIVE / OVERNIGHT EVENTS:  Patient states overall doing well. Pain has improved since admission. Currently pain is 7/10 LLQ and flank pain. Eating, drinking, urinating well. Last BM weds. Pt received 2 units PRBC's yesterday for a H/H of 7.7/24.6. Planned angio with possible embolization today.  No chest pain, SOB. Pt has other complaints at this time.     MEDICATIONS  (STANDING):  docusate sodium 100 milliGRAM(s) Oral three times a day  tamsulosin 0.4 milliGRAM(s) Oral at bedtime  simvastatin 40 milliGRAM(s) Oral at bedtime  ferrous    sulfate 325 milliGRAM(s) Oral daily  carvedilol 12.5 milliGRAM(s) Oral every 12 hours  senna 2 Tablet(s) Oral at bedtime  polyethylene glycol 3350 17 Gram(s) Oral two times a day  dextrose 5% + sodium chloride 0.45%. 1000 milliLiter(s) (75 mL/Hr) IV Continuous <Continuous>    MEDICATIONS  (PRN):  acetaminophen   Tablet. 650 milliGRAM(s) Oral every 6 hours PRN Mild Pain (1 - 3)  oxyCODONE    5 mG/acetaminophen 325 mG 1 Tablet(s) Oral every 4 hours PRN Moderate Pain  oxyCODONE    5 mG/acetaminophen 325 mG 2 Tablet(s) Oral every 6 hours PRN Severe Pain  ondansetron Injectable 4 milliGRAM(s) IV Push every 6 hours PRN Nausea and/or Vomiting  HYDROmorphone  Injectable 0.5 milliGRAM(s) IV Push every 3 hours PRN breakthrough severe pain  ALBUTerol    90 MICROgram(s) HFA Inhaler 2 Puff(s) Inhalation every 6 hours PRN Shortness of Breath and/or Wheezing      Vital Signs Last 24 Hrs  T(C): 37.2 (11 Sep 2017 06:39), Max: 37.3 (10 Sep 2017 15:15)  T(F): 98.9 (11 Sep 2017 06:39), Max: 99.1 (10 Sep 2017 15:15)  HR: 94 (11 Sep 2017 06:39) (92 - 99)  BP: 116/78 (11 Sep 2017 06:39) (98/59 - 125/71)  BP(mean): --  RR: 17 (11 Sep 2017 06:39) (16 - 17)  SpO2: 100% (11 Sep 2017 06:39) (98% - 100%)    PHYSICAL EXAM:  GENERAL: NAD, well-developed  HEAD:  Atraumatic, Normocephalic  EYES: EOMI, PERRLA, conjunctiva and sclera clear  NECK: Supple, No JVD  CHEST/LUNG: Clear to auscultation bilaterally; No wheeze  HEART: tachy, no murmur  ABDOMEN: Soft, dec BS, tender LUQ  EXTREMITIES:  2+ Peripheral Pulses, No clubbing, cyanosis, or edema  PSYCH: AAOx3  NEUROLOGY: contractures hands bilaterally, R HP, disconjugate gaze  SKIN: No rashes or lesions    LABS:                        10.0   8.99  )-----------( 208      ( 11 Sep 2017 05:40 )             31.4   09-11    140  |  104  |  16  ----------------------------<  91  4.1   |  24  |  1.11    Ca    8.3<L>      11 Sep 2017 05:40        RADIOLOGY & ADDITIONAL TESTS:    Imaging Personally Reviewed:    Consultant(s) Notes Reviewed:      Care Discussed with Consultants/Other Providers: urology re overall care

## 2017-09-11 NOTE — PROGRESS NOTE ADULT - PROBLEM SELECTOR PLAN 3
- likely partially reactive d/t acute blood loss  - ECG with q waves inferiorly, pt denies h/o any heart problems, will try to get old records from PMD;  recommend check echo in case need surgical procedure  - not been getting Coreg, will change hold parameters to SBP <100, f/u HR/BP closely - likely partially reactive d/t acute blood loss  - ECG with q waves inferiorly, pt denies h/o any heart problems, will try to get old records from PMD;  fu echo  - not been getting Coreg, will change hold parameters to SBP <100, f/u HR/BP closely - likely partially reactive d/t acute blood loss  - ECG with q waves inferiorly, pt denies h/o any heart problems, will try to get old records from PMD;  f/u echo  - not been getting Coreg, will change hold parameters to SBP <100, f/u HR/BP closely

## 2017-09-11 NOTE — PROGRESS NOTE ADULT - ASSESSMENT
46 yo M with PMHx of [place] in 2000, CVA with R hemiparesis, uses WC, 24/7 HHA, who presents with LLQ pain x 24hrs prior to admission; has subcapsular hematoma L.

## 2017-09-11 NOTE — PROGRESS NOTE ADULT - ASSESSMENT
43yo M with PMHx of Gullian-[place] in 2000, CVA with R hemiparesis, uses WC, 24/7 HHA, who presents with LLQ pain x 24hrs prior to admission. The LLQ pain was intermittent, dull, radiating to the left flank and back associated with nausea and multiple episodes of vomiting. Last BM was Wed, he usually goes every other day. Patient found to have a 8.5cm x 6.6cm X 10cm left renal subscapular hematoma with suspicion for underlying renal lesion as well as a 5mm distal right ureter calculus and bilat subcentimeter non obstructing renal calculi. Repeat CT showed stable left renal subcapsular hematoma w/ associated perinephric and retroperitoneal hemorrhage. Yesterday patients H/H dropped to 7.7/24.6 and received 2 units of PRBC's with a appropriate response. 41yo M with PMHx of Gullian-[place] in 2000, CVA with R hemiparesis, uses WC, 24/7 HHA, who presents with LLQ pain x 24hrs prior to admission. The LLQ pain was intermittent, dull, radiating to the left flank and back associated with nausea and multiple episodes of vomiting. Last BM was Wed, he usually goes every other day. Patient found to have a 8.5cm x 6.6cm X 10cm left renal subscapular hematoma with suspicion for underlying renal lesion as well as a 5mm distal right ureter calculus and bilat subcentimeter non obstructing renal calculi. Repeat CT showed stable left renal subcapsular hematoma w/ associated perinephric and retroperitoneal hemorrhage. Yesterday patients H/H dropped to 7.7/24.6 and received 2 units of PRBC's with a appropriate response. Scheduled today for possible angio and embolization.

## 2017-09-12 ENCOUNTER — TRANSCRIPTION ENCOUNTER (OUTPATIENT)
Age: 42
End: 2017-09-12

## 2017-09-12 DIAGNOSIS — S37.011A MINOR CONTUSION OF RIGHT KIDNEY, INITIAL ENCOUNTER: ICD-10-CM

## 2017-09-12 PROBLEM — Z00.00 ENCOUNTER FOR PREVENTIVE HEALTH EXAMINATION: Status: ACTIVE | Noted: 2017-09-12

## 2017-09-12 LAB
BUN SERPL-MCNC: 15 MG/DL — SIGNIFICANT CHANGE UP (ref 7–23)
CALCIUM SERPL-MCNC: 8.2 MG/DL — LOW (ref 8.4–10.5)
CHLORIDE SERPL-SCNC: 101 MMOL/L — SIGNIFICANT CHANGE UP (ref 98–107)
CO2 SERPL-SCNC: 24 MMOL/L — SIGNIFICANT CHANGE UP (ref 22–31)
CREAT SERPL-MCNC: 0.95 MG/DL — SIGNIFICANT CHANGE UP (ref 0.5–1.3)
GLUCOSE SERPL-MCNC: 96 MG/DL — SIGNIFICANT CHANGE UP (ref 70–99)
HCT VFR BLD CALC: 31.2 % — LOW (ref 39–50)
HGB BLD-MCNC: 9.9 G/DL — LOW (ref 13–17)
MCHC RBC-ENTMCNC: 27.7 PG — SIGNIFICANT CHANGE UP (ref 27–34)
MCHC RBC-ENTMCNC: 31.7 % — LOW (ref 32–36)
MCV RBC AUTO: 87.4 FL — SIGNIFICANT CHANGE UP (ref 80–100)
NRBC # FLD: 0 — SIGNIFICANT CHANGE UP
PLATELET # BLD AUTO: 235 K/UL — SIGNIFICANT CHANGE UP (ref 150–400)
PMV BLD: 9.5 FL — SIGNIFICANT CHANGE UP (ref 7–13)
POTASSIUM SERPL-MCNC: 3.9 MMOL/L — SIGNIFICANT CHANGE UP (ref 3.5–5.3)
POTASSIUM SERPL-SCNC: 3.9 MMOL/L — SIGNIFICANT CHANGE UP (ref 3.5–5.3)
RBC # BLD: 3.57 M/UL — LOW (ref 4.2–5.8)
RBC # FLD: 14.8 % — HIGH (ref 10.3–14.5)
SODIUM SERPL-SCNC: 138 MMOL/L — SIGNIFICANT CHANGE UP (ref 135–145)
WBC # BLD: 8.28 K/UL — SIGNIFICANT CHANGE UP (ref 3.8–10.5)
WBC # FLD AUTO: 8.28 K/UL — SIGNIFICANT CHANGE UP (ref 3.8–10.5)

## 2017-09-12 PROCEDURE — 99233 SBSQ HOSP IP/OBS HIGH 50: CPT

## 2017-09-12 RX ORDER — TAMSULOSIN HYDROCHLORIDE 0.4 MG/1
1 CAPSULE ORAL
Qty: 0 | Refills: 0 | DISCHARGE
Start: 2017-09-12

## 2017-09-12 RX ORDER — ASPIRIN/CALCIUM CARB/MAGNESIUM 324 MG
1 TABLET ORAL
Qty: 0 | Refills: 0 | COMMUNITY

## 2017-09-12 RX ADMIN — Medication 325 MILLIGRAM(S): at 12:58

## 2017-09-12 RX ADMIN — OXYCODONE AND ACETAMINOPHEN 2 TABLET(S): 5; 325 TABLET ORAL at 06:35

## 2017-09-12 RX ADMIN — Medication 100 MILLIGRAM(S): at 06:05

## 2017-09-12 RX ADMIN — CARVEDILOL PHOSPHATE 12.5 MILLIGRAM(S): 80 CAPSULE, EXTENDED RELEASE ORAL at 06:05

## 2017-09-12 RX ADMIN — OXYCODONE AND ACETAMINOPHEN 2 TABLET(S): 5; 325 TABLET ORAL at 06:05

## 2017-09-12 NOTE — PROGRESS NOTE ADULT - SUBJECTIVE AND OBJECTIVE BOX
Subjective    no acute events  denies pain, tolerating diet, voiding well    Objective    Vital signs  T(F): , Max: 98.7 HR: 86 BP: 116/73 SpO2: 100%    V: 1025 cc    Gen: NAD  Abd: soft, NT, ND  Back: mild L CVAT  groin: 2+ R femoral pulse, no hematoma, dressing CDI    Labs      09-11 @ 05:40    WBC 8.99  / Hct 31.4  / SCr 1.11     09-11 @ 00:00    WBC 11.19 / Hct 31.3  / SCr 1.32

## 2017-09-12 NOTE — PROGRESS NOTE ADULT - PROBLEM SELECTOR PROBLEM 1
Renal hematoma
Kidney hematoma, left, initial encounter
Kidney hematoma, right, initial encounter
Renal hematoma
Kidney hematoma, left, initial encounter

## 2017-09-12 NOTE — DISCHARGE NOTE ADULT - ADDITIONAL INSTRUCTIONS
call office for appt call office for appt with Dr. Durham for your kidney; someone will call you from our team to tell you about a medical appt we need you to keep

## 2017-09-12 NOTE — PROGRESS NOTE ADULT - PROVIDER SPECIALTY LIST ADULT
Hospitalist
Intervent Radiology
Surgery
Urology
Hospitalist

## 2017-09-12 NOTE — DISCHARGE NOTE ADULT - CARE PROVIDER_API CALL
Miguelangel Durham), Urology  [address] [place] [address]  Phone: [phone]  Fax: [phone]

## 2017-09-12 NOTE — PROGRESS NOTE ADULT - PROBLEM SELECTOR PLAN 2
being w/u'd as outpt, c/w iron, likely related to above chronically, now with acute blood loss.
being w/u'd as outpt, c/w iron, likely related to above chronically, now with acute blood loss --> getting transfused, would aim for Hb>8
being w/u'd as outpt, c/w iron, likely related to above chronically, now with acute blood loss --> received 2 units PRBC yesterday. Transfuse if HGb <8
being w/u'd as outpt, c/w iron, likely related to above chronically, now with acute blood loss --> received 2 units PRBC yesterday. Transfuse if HGb <8  today hgb 9.9

## 2017-09-12 NOTE — PROGRESS NOTE ADULT - PROBLEM SELECTOR PLAN 8
poor functional status at baseline, DVT ppx (VD)/IS.

## 2017-09-12 NOTE — PROGRESS NOTE ADULT - PROBLEM SELECTOR PROBLEM 2
Iron deficiency anemia, unspecified iron deficiency anemia type

## 2017-09-12 NOTE — DISCHARGE NOTE ADULT - INSTRUCTIONS
as tolerated Follow up with Dr. Durham. Follow up with medical doctor. Call for any pain ,or bleeding.

## 2017-09-12 NOTE — PROGRESS NOTE ADULT - PROBLEM SELECTOR PLAN 1
-Reg diet, IVL  -f/u AM CBC, BMP  -transfuse if Hg < 8  -dispo: d/c planning, f/u within 1 mo for reimaging to assess for hematoma improvement and source.

## 2017-09-12 NOTE — PROGRESS NOTE ADULT - PROBLEM/PLAN-2
24 y.o.  39w1d The patient is here for routine obstetrical followup. She reports good fetal movement. She denies contractions, vaginal bleeding, or leaking of fluid.  Here for pre-op.    The patient's pregnancy is complicated by   Patient Active Problem List    Diagnosis Date Noted   • History of  delivery 2017     Priority: High   • Supervision of other high risk pregnancy, antepartum 2017     Priority: High   • ASCUS with positive high risk HPV cervical 2017     Priority: Medium   • ASTHMA      Priority: Medium   • Group B Streptococcus carrier, +RV culture, currently pregnant 2017   • Anemia - 10.2/31.6 on 2017     She was supposed to have a repeat C/S today, but did not show up on time.  Rescheduled for 17 @ 930 am.     Discussed with the patient indications for  delivery. The patient voiced understanding of indications for  section at this time.    Discussed with the patient the risks of  delivery. The risks include bleeding, infection, transfusion, emergency hysterectomy to control bleeding, damage to surrounding organs (bowel, bladder, ureters, nerves, vessels), need for repair or future surgery, fetal injury, unexpected pathology, anesthesia risks, and rarely death.  I also discussed with the patient the risk of wound infection, wound breakdown, and scarring. We discussed that these risks are greater in people that have diabetes or obesity.    The patient had the opportunity to ask questions regarding the procedure. All questions answered to the patient's satisfaction. Plan to proceed with  delivery on 17 @ 930 am.     Followup 17 for repeat C/S.   Declines BTL.   Labor precautions were discussed with patient  Fetal kick counts were discussed with patient  
DISPLAY PLAN FREE TEXT

## 2017-09-12 NOTE — DISCHARGE NOTE ADULT - PATIENT PORTAL LINK FT
“You can access the FollowHealth Patient Portal, offered by [facility], by registering with the following website: [email or web address]”

## 2017-09-12 NOTE — DISCHARGE NOTE ADULT - HOSPITAL COURSE
Pt admitted with flank pain and L. renal subcapsular hematome; pt's urine clear;  CBC's were followed, and pt needed to recieve 2 UPC;  IR was called to angio, but they did not find a bleed; his blood counts have been stable; he will f/u as outpt to repeat study to have a second look at kidney to see why it bled; home today

## 2017-09-12 NOTE — DISCHARGE NOTE ADULT - MEDICATION SUMMARY - MEDICATIONS TO TAKE
I will START or STAY ON the medications listed below when I get home from the hospital:    oxyCODONE-acetaminophen 5 mg-325 mg oral tablet  -- 2 tab(s) by mouth every 6 hours, As needed, Severe Pain  -- Indication: For pain if needed    oxyCODONE-acetaminophen 5 mg-325 mg oral tablet  -- 2 tab(s) by mouth every 6 hours, As Needed -Moderate Pain MDD:81-    If you need to speak to me my beeper is [id number] [address] Carmen PA on 9T  -- Indication: For Same    tamsulosin 0.4 mg oral capsule  -- 1 cap(s) by mouth once a day (at bedtime)  -- Indication: For Ignore this order    simvastatin 40 mg oral tablet  -- 1 tab(s) by mouth once a day (at bedtime)  -- Indication: For Home med    carvedilol 12.5 mg oral tablet  -- 1 tab(s) by mouth 2 times a day  -- Indication: For Home med    Ventolin HFA 90 mcg/inh inhalation aerosol  -- 2 puff(s) inhaled 4 times a day, As Needed  -- Indication: For Home med    ferrous sulfate 325 mg (65 mg elemental iron) oral tablet  -- 1 tab(s) by mouth once a day  -- Indication: For Home med    Colace 100 mg oral capsule  -- 1 cap(s) by mouth 2 times a day  -- Indication: For Home med    calcium-vitamin D 500 mg-200 intl units oral tablet  -- 1 tab(s) by mouth once a day  -- Indication: For Home med

## 2017-09-12 NOTE — PROGRESS NOTE ADULT - ASSESSMENT
41yo M with PMHx of Gullian-[place] in 2000, CVA with R hemiparesis, uses WC, 24/7 HHA, who presents with LLQ pain x 24hrs prior to admission. The LLQ pain was intermittent, dull, radiating to the left flank and back associated with nausea and multiple episodes of vomiting. Last BM was Wed, he usually goes every other day. Patient found to have a 8.5cm x 6.6cm X 10cm left renal subscapular hematoma with suspicion for underlying renal lesion as well as a 5mm distal right ureter calculus and bilat subcentimeter non obstructing renal calculi. Repeat CT showed stable left renal subcapsular hematoma w/ associated perinephric and retroperitoneal hemorrhage. Yesterday patients H/H dropped to 7.7/24.6 and received 2 units of PRBC's with a appropriate response. Scheduled today for possible angio and embolization.

## 2017-09-12 NOTE — DISCHARGE NOTE ADULT - CARE PLAN
Principal Discharge DX:	Kidney hematoma, left, initial encounter  Goal:	bleeding has stopped  Instructions for follow-up, activity and diet:	as above

## 2017-09-12 NOTE — DISCHARGE NOTE ADULT - CONDITIONS AT DISCHARGE
Pt afebrile and offers no complaints. In no acute distress. Voiding in adequate amounts. Tolerating diet well.

## 2017-09-12 NOTE — PROGRESS NOTE ADULT - PROBLEM SELECTOR PLAN 3
- likely partially reactive d/t acute blood loss now resolved  - ECG with q waves inferiorly, pt denies h/o any heart problems, will try to get old records from PMD;  f/u echo as outpatient  - c/w Coreg, f/u HR/BP closely

## 2017-09-12 NOTE — PROGRESS NOTE ADULT - SUBJECTIVE AND OBJECTIVE BOX
CHIEF COMPLAINT: Patient is a 42y old  male who presents with a chief complaint of LLQ pain, Left Renal, RP bleed (08 Sep 2017 00:17)    SUBJECTIVE / OVERNIGHT EVENTS: Patient seen and examined. No acute events overnight. Pain well controlled and patient without any complaints.     MEDICATIONS  (STANDING):  docusate sodium 100 milliGRAM(s) Oral three times a day  tamsulosin 0.4 milliGRAM(s) Oral at bedtime  simvastatin 40 milliGRAM(s) Oral at bedtime  ferrous    sulfate 325 milliGRAM(s) Oral daily  carvedilol 12.5 milliGRAM(s) Oral every 12 hours  senna 2 Tablet(s) Oral at bedtime  polyethylene glycol 3350 17 Gram(s) Oral two times a day    MEDICATIONS  (PRN):  acetaminophen   Tablet. 650 milliGRAM(s) Oral every 6 hours PRN Mild Pain (1 - 3)  oxyCODONE    5 mG/acetaminophen 325 mG 1 Tablet(s) Oral every 4 hours PRN Moderate Pain  oxyCODONE    5 mG/acetaminophen 325 mG 2 Tablet(s) Oral every 6 hours PRN Severe Pain  ondansetron Injectable 4 milliGRAM(s) IV Push every 6 hours PRN Nausea and/or Vomiting  HYDROmorphone  Injectable 0.5 milliGRAM(s) IV Push every 3 hours PRN breakthrough severe pain  ALBUTerol    90 MICROgram(s) HFA Inhaler 2 Puff(s) Inhalation every 6 hours PRN Shortness of Breath and/or Wheezing    VITALS:  T(F): 98.6 (09-12-17 @ 09:26), Max: 98.7 (09-11-17 @ 16:26)  HR: 88 (09-12-17 @ 09:26) (86 - 91)  BP: 118/86 (09-12-17 @ 09:26) (110/65 - 119/69)  RR: 16 (09-12-17 @ 09:26) (15 - 18)  SpO2: 98% (09-12-17 @ 09:26)  Height (cm): 190.5 (21:16)  Weight (kg): 82.6 (21:16)  BMI (kg/m2): 22.8 (21:16)    PHYSICAL EXAM:  GENERAL: NAD, well-developed  HEAD:  Atraumatic, Normocephalic  EYES: EOMI, PERRLA, conjunctiva and sclera clear  NECK: Supple, No JVD  CHEST/LUNG: Clear to auscultation bilaterally; No wheeze  HEART: tachy, no murmur  ABDOMEN: Soft, dec BS, tender LUQ  EXTREMITIES:  2+ Peripheral Pulses, No clubbing, cyanosis, or edema  PSYCH: AAOx3  NEUROLOGY: contractures hands bilaterally, R HP, disconjugate gaze  SKIN: No rashes or lesions    LABS:              9.9                  138  | 24   | 15           8.28  >-----------< 235     ------------------------< 96                    31.2                 3.9  | 101  | 0.95                                         Ca 8.2   Mg x     Ph x          [ ] Consultant(s) Notes Reviewed:  [x] Care Discussed with Consultants/Other Providers: Urology PA - discussed disposition

## 2017-09-12 NOTE — PROGRESS NOTE ADULT - NSHPATTENDINGPLANDISCUSS_GEN_ALL_CORE
Gu team
Patient who is in agreement with plan described above.
Patient who is in agreement with plan described above.

## 2017-09-12 NOTE — PROGRESS NOTE ADULT - PROBLEM SELECTOR PLAN 1
Per Urology - angio by IR revealed no source of bleeding  Monitoring H/H, patient vitals, and patient for any signs of acute blood loss closely. Continue with pain medications as needed for pain.   -per urology, will f/u within 1 mo for re-imaging to assess for hematoma improvement and source.   - would keep Hb >8

## 2017-09-12 NOTE — PROGRESS NOTE ADULT - PROBLEM SELECTOR PROBLEM 8
Guillain-[place] syndrome
Guillain-[place] syndrome
Guillain-[place] syndrome
Guillain-[place] syndrome

## 2017-09-13 VITALS
SYSTOLIC BLOOD PRESSURE: 127 MMHG | RESPIRATION RATE: 16 BRPM | OXYGEN SATURATION: 96 % | WEIGHT: 156.75 LBS | HEART RATE: 86 BPM | DIASTOLIC BLOOD PRESSURE: 74 MMHG | TEMPERATURE: 98 F

## 2017-09-13 PROBLEM — E78.5 HYPERLIPIDEMIA, UNSPECIFIED: Chronic | Status: ACTIVE | Noted: 2017-09-07

## 2017-09-13 PROBLEM — I10 ESSENTIAL (PRIMARY) HYPERTENSION: Chronic | Status: ACTIVE | Noted: 2017-09-07

## 2017-09-26 ENCOUNTER — APPOINTMENT (OUTPATIENT)
Dept: INTERNAL MEDICINE | Facility: HOSPITAL | Age: 42
End: 2017-09-26

## 2017-10-16 ENCOUNTER — FORM ENCOUNTER (OUTPATIENT)
Age: 42
End: 2017-10-16

## 2017-10-17 ENCOUNTER — OUTPATIENT (OUTPATIENT)
Dept: OUTPATIENT SERVICES | Facility: HOSPITAL | Age: 42
LOS: 1 days | End: 2017-10-17
Payer: MEDICAID

## 2017-10-17 ENCOUNTER — APPOINTMENT (OUTPATIENT)
Dept: UROLOGY | Facility: CLINIC | Age: 42
End: 2017-10-17
Payer: MEDICAID

## 2017-10-17 ENCOUNTER — APPOINTMENT (OUTPATIENT)
Dept: CT IMAGING | Facility: IMAGING CENTER | Age: 42
End: 2017-10-17
Payer: MEDICAID

## 2017-10-17 VITALS
HEART RATE: 66 BPM | SYSTOLIC BLOOD PRESSURE: 117 MMHG | TEMPERATURE: 99.2 F | HEIGHT: 75 IN | RESPIRATION RATE: 17 BRPM | DIASTOLIC BLOOD PRESSURE: 82 MMHG | WEIGHT: 173 LBS | BODY MASS INDEX: 21.51 KG/M2

## 2017-10-17 DIAGNOSIS — Z84.1 FAMILY HISTORY OF DISORDERS OF KIDNEY AND URETER: ICD-10-CM

## 2017-10-17 DIAGNOSIS — S37.019A MINOR CONTUSION OF UNSPECIFIED KIDNEY, INITIAL ENCOUNTER: ICD-10-CM

## 2017-10-17 DIAGNOSIS — Z86.73 PERSONAL HISTORY OF TRANSIENT ISCHEMIC ATTACK (TIA), AND CEREBRAL INFARCTION W/OUT RESIDUAL DEFICITS: ICD-10-CM

## 2017-10-17 DIAGNOSIS — Z86.39 PERSONAL HISTORY OF OTHER ENDOCRINE, NUTRITIONAL AND METABOLIC DISEASE: ICD-10-CM

## 2017-10-17 DIAGNOSIS — Z86.79 PERSONAL HISTORY OF OTHER DISEASES OF THE CIRCULATORY SYSTEM: ICD-10-CM

## 2017-10-17 DIAGNOSIS — Z80.3 FAMILY HISTORY OF MALIGNANT NEOPLASM OF BREAST: ICD-10-CM

## 2017-10-17 DIAGNOSIS — Z43.0 ENCOUNTER FOR ATTENTION TO TRACHEOSTOMY: Chronic | ICD-10-CM

## 2017-10-17 DIAGNOSIS — G61.0 GUILLAIN-BARRE SYNDROME: ICD-10-CM

## 2017-10-17 PROCEDURE — 74170 CT ABD WO CNTRST FLWD CNTRST: CPT

## 2017-10-17 PROCEDURE — 74170 CT ABD WO CNTRST FLWD CNTRST: CPT | Mod: 26

## 2017-10-17 PROCEDURE — 82565 ASSAY OF CREATININE: CPT

## 2017-10-17 PROCEDURE — 99213 OFFICE O/P EST LOW 20 MIN: CPT

## 2017-11-20 ENCOUNTER — APPOINTMENT (OUTPATIENT)
Dept: INTERNAL MEDICINE | Facility: CLINIC | Age: 42
End: 2017-11-20

## 2018-01-15 ENCOUNTER — FORM ENCOUNTER (OUTPATIENT)
Age: 43
End: 2018-01-15

## 2018-01-16 ENCOUNTER — APPOINTMENT (OUTPATIENT)
Dept: CT IMAGING | Facility: IMAGING CENTER | Age: 43
End: 2018-01-16
Payer: MEDICAID

## 2018-01-16 ENCOUNTER — APPOINTMENT (OUTPATIENT)
Dept: UROLOGY | Facility: CLINIC | Age: 43
End: 2018-01-16
Payer: MEDICAID

## 2018-01-16 ENCOUNTER — OUTPATIENT (OUTPATIENT)
Dept: OUTPATIENT SERVICES | Facility: HOSPITAL | Age: 43
LOS: 1 days | End: 2018-01-16
Payer: MEDICAID

## 2018-01-16 DIAGNOSIS — Z43.0 ENCOUNTER FOR ATTENTION TO TRACHEOSTOMY: Chronic | ICD-10-CM

## 2018-01-16 DIAGNOSIS — S37.019A MINOR CONTUSION OF UNSPECIFIED KIDNEY, INITIAL ENCOUNTER: ICD-10-CM

## 2018-01-16 PROCEDURE — 99213 OFFICE O/P EST LOW 20 MIN: CPT

## 2018-01-16 PROCEDURE — 74170 CT ABD WO CNTRST FLWD CNTRST: CPT | Mod: 26

## 2018-01-16 PROCEDURE — 74170 CT ABD WO CNTRST FLWD CNTRST: CPT

## 2018-01-16 PROCEDURE — 82565 ASSAY OF CREATININE: CPT

## 2018-07-22 PROBLEM — Z86.73 HISTORY OF STROKE: Status: RESOLVED | Noted: 2017-10-17 | Resolved: 2018-07-22

## 2018-07-23 ENCOUNTER — FORM ENCOUNTER (OUTPATIENT)
Age: 43
End: 2018-07-23

## 2018-07-24 ENCOUNTER — OTHER (OUTPATIENT)
Age: 43
End: 2018-07-24

## 2018-07-24 ENCOUNTER — OUTPATIENT (OUTPATIENT)
Dept: OUTPATIENT SERVICES | Facility: HOSPITAL | Age: 43
LOS: 1 days | End: 2018-07-24
Payer: MEDICAID

## 2018-07-24 ENCOUNTER — APPOINTMENT (OUTPATIENT)
Dept: CT IMAGING | Facility: IMAGING CENTER | Age: 43
End: 2018-07-24
Payer: MEDICAID

## 2018-07-24 ENCOUNTER — APPOINTMENT (OUTPATIENT)
Dept: UROLOGY | Facility: CLINIC | Age: 43
End: 2018-07-24
Payer: MEDICAID

## 2018-07-24 VITALS
TEMPERATURE: 98.2 F | HEIGHT: 75 IN | HEART RATE: 77 BPM | BODY MASS INDEX: 21.51 KG/M2 | SYSTOLIC BLOOD PRESSURE: 101 MMHG | RESPIRATION RATE: 17 BRPM | WEIGHT: 173 LBS | DIASTOLIC BLOOD PRESSURE: 75 MMHG

## 2018-07-24 DIAGNOSIS — S37.019A MINOR CONTUSION OF UNSPECIFIED KIDNEY, INITIAL ENCOUNTER: ICD-10-CM

## 2018-07-24 DIAGNOSIS — Z43.0 ENCOUNTER FOR ATTENTION TO TRACHEOSTOMY: Chronic | ICD-10-CM

## 2018-07-24 PROCEDURE — 82565 ASSAY OF CREATININE: CPT

## 2018-07-24 PROCEDURE — 99213 OFFICE O/P EST LOW 20 MIN: CPT

## 2018-07-24 PROCEDURE — 74170 CT ABD WO CNTRST FLWD CNTRST: CPT

## 2018-07-24 PROCEDURE — 74170 CT ABD WO CNTRST FLWD CNTRST: CPT | Mod: 26

## 2019-10-11 ENCOUNTER — EMERGENCY (EMERGENCY)
Facility: HOSPITAL | Age: 44
LOS: 0 days | Discharge: HOME | End: 2019-10-11
Attending: EMERGENCY MEDICINE
Payer: MEDICAID

## 2019-10-11 VITALS
HEART RATE: 73 BPM | SYSTOLIC BLOOD PRESSURE: 156 MMHG | HEIGHT: 65 IN | WEIGHT: 175.05 LBS | TEMPERATURE: 98 F | DIASTOLIC BLOOD PRESSURE: 84 MMHG | OXYGEN SATURATION: 99 % | RESPIRATION RATE: 16 BRPM
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
## 2019-10-11 DIAGNOSIS — E78.5 HYPERLIPIDEMIA, UNSPECIFIED: ICD-10-CM

## 2019-10-11 DIAGNOSIS — I69.331 MONOPLEGIA OF UPPER LIMB FOLLOWING CEREBRAL INFARCTION AFFECTING RIGHT DOMINANT SIDE: ICD-10-CM

## 2019-10-11 DIAGNOSIS — I69.334 MONOPLEGIA OF UPPER LIMB FOLLOWING CEREBRAL INFARCTION AFFECTING LEFT NON-DOMINANT SIDE: ICD-10-CM

## 2019-10-11 DIAGNOSIS — G61.0 GUILLAIN-BARRE SYNDROME: ICD-10-CM

## 2019-10-11 DIAGNOSIS — M25.561 PAIN IN RIGHT KNEE: ICD-10-CM

## 2019-10-11 DIAGNOSIS — Z88.0 ALLERGY STATUS TO PENICILLIN: ICD-10-CM

## 2019-10-11 DIAGNOSIS — Z43.0 ENCOUNTER FOR ATTENTION TO TRACHEOSTOMY: Chronic | ICD-10-CM

## 2019-10-11 DIAGNOSIS — S72.401A UNSPECIFIED FRACTURE OF LOWER END OF RIGHT FEMUR, INITIAL ENCOUNTER FOR CLOSED FRACTURE: ICD-10-CM
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
## 2019-10-11 DIAGNOSIS — I10 ESSENTIAL (PRIMARY) HYPERTENSION: ICD-10-CM

## 2019-10-11 LAB
ANION GAP SERPL CALC-SCNC: 7 MMOL/L — SIGNIFICANT CHANGE UP (ref 5–17)
APTT BLD: 35.2 SEC — SIGNIFICANT CHANGE UP (ref 27.5–36.3)
BUN SERPL-MCNC: 15 MG/DL — SIGNIFICANT CHANGE UP (ref 7–23)
CALCIUM SERPL-MCNC: 8.9 MG/DL — SIGNIFICANT CHANGE UP (ref 8.5–10.1)
CHLORIDE SERPL-SCNC: 106 MMOL/L — SIGNIFICANT CHANGE UP (ref 96–108)
CO2 SERPL-SCNC: 26 MMOL/L — SIGNIFICANT CHANGE UP (ref 22–31)
CREAT SERPL-MCNC: 0.7 MG/DL — SIGNIFICANT CHANGE UP (ref 0.5–1.3)
GLUCOSE SERPL-MCNC: 83 MG/DL — SIGNIFICANT CHANGE UP (ref 70–99)
HCT VFR BLD CALC: 49.9 % — SIGNIFICANT CHANGE UP (ref 39–50)
HGB BLD-MCNC: 15.8 G/DL — SIGNIFICANT CHANGE UP (ref 13–17)
INR BLD: 1.03 RATIO — SIGNIFICANT CHANGE UP (ref 0.88–1.16)
MCHC RBC-ENTMCNC: 27.7 PG — SIGNIFICANT CHANGE UP (ref 27–34)
MCHC RBC-ENTMCNC: 31.7 GM/DL — LOW (ref 32–36)
MCV RBC AUTO: 87.5 FL — SIGNIFICANT CHANGE UP (ref 80–100)
NRBC # BLD: 0 /100 WBCS — SIGNIFICANT CHANGE UP (ref 0–0)
PLATELET # BLD AUTO: 245 K/UL — SIGNIFICANT CHANGE UP (ref 150–400)
POTASSIUM SERPL-MCNC: 3.9 MMOL/L — SIGNIFICANT CHANGE UP (ref 3.5–5.3)
POTASSIUM SERPL-SCNC: 3.9 MMOL/L — SIGNIFICANT CHANGE UP (ref 3.5–5.3)
PROTHROM AB SERPL-ACNC: 11.6 SEC — SIGNIFICANT CHANGE UP (ref 10–12.9)
RBC # BLD: 5.7 M/UL — SIGNIFICANT CHANGE UP (ref 4.2–5.8)
RBC # FLD: 13.8 % — SIGNIFICANT CHANGE UP (ref 10.3–14.5)
SODIUM SERPL-SCNC: 139 MMOL/L — SIGNIFICANT CHANGE UP (ref 135–145)
WBC # BLD: 10.29 K/UL — SIGNIFICANT CHANGE UP (ref 3.8–10.5)
WBC # FLD AUTO: 10.29 K/UL — SIGNIFICANT CHANGE UP (ref 3.8–10.5)

## 2019-10-11 PROCEDURE — 73590 X-RAY EXAM OF LOWER LEG: CPT | Mod: 26,RT

## 2019-10-11 PROCEDURE — 73552 X-RAY EXAM OF FEMUR 2/>: CPT | Mod: 26,RT

## 2019-10-11 PROCEDURE — 99284 EMERGENCY DEPT VISIT MOD MDM: CPT | Mod: 25

## 2019-10-11 PROCEDURE — 93010 ELECTROCARDIOGRAM REPORT: CPT

## 2019-10-11 PROCEDURE — 27508 TREATMENT OF THIGH FRACTURE: CPT | Mod: 54

## 2019-10-11 PROCEDURE — 73562 X-RAY EXAM OF KNEE 3: CPT | Mod: 26,RT

## 2019-10-11 PROCEDURE — 71045 X-RAY EXAM CHEST 1 VIEW: CPT | Mod: 26

## 2019-10-11 RX ORDER — OXYCODONE AND ACETAMINOPHEN 5; 325 MG/1; MG/1
1 TABLET ORAL ONCE
Refills: 0 | Status: DISCONTINUED | OUTPATIENT
Start: 2019-10-11 | End: 2019-10-11

## 2019-10-11 RX ORDER — ACETAMINOPHEN 500 MG
650 TABLET ORAL ONCE
Refills: 0 | Status: COMPLETED | OUTPATIENT
Start: 2019-10-11 | End: 2019-10-11

## 2019-10-11 RX ADMIN — OXYCODONE AND ACETAMINOPHEN 1 TABLET(S): 5; 325 TABLET ORAL at 18:12

## 2019-10-11 RX ADMIN — Medication 650 MILLIGRAM(S): at 15:30

## 2019-10-11 RX ADMIN — Medication 650 MILLIGRAM(S): at 14:40

## 2019-10-11 NOTE — ED PROVIDER NOTE - CARE PLAN
Principal Discharge DX:	Closed fracture of distal end of right femur, unspecified fracture morphology, initial encounter
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
## 2019-10-11 NOTE — ED PROVIDER NOTE - PHYSICAL EXAMINATION
Gen: Alert, NAD  Head: NC, AT, PERRL, EOMI, normal lids/conjunctiva  ENT: B TM WNL, normal hearing, patent oropharynx without erythema/exudate, uvula midline  Neck: +supple, no tenderness/meningismus/JVD, +Trachea midline  Pulm: Bilateral BS, normal resp effort, no wheeze/stridor/retractions  CV: RRR, no M/R/G, +dist pulses  Abd: soft, NT/ND, +BS, no hepatosplenomegaly  Mskel: no edema/erythema/cyanosis +paraplegic +tenderness to medial R knee with mild swelling +inversion of LLE, skin intact  Skin: no rash  Neuro: AAOx3,
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
## 2019-10-11 NOTE — ED PROVIDER NOTE - ATTENDING CONTRIBUTION TO CARE
Jose:  I have independently evaluated the patient and have documented in the appropriate sections above.  I agree with the exam and plan as noted above.

## 2019-10-11 NOTE — ED PROVIDER NOTE - PATIENT PORTAL LINK FT
You can access the FollowMyHealth Patient Portal offered by [facility] by registering at the following website: [email or web address]. By joining [facility]’s FollowMyHealth portal, you will also be able to view your health information using other applications (apps) compatible with our system.

## 2019-10-11 NOTE — ED PROVIDER NOTE - PMH
CVA (cerebral vascular accident)    Guillain-[place] syndrome    HLD (hyperlipidemia)    HTN (hypertension)

## 2019-10-11 NOTE — ED ADULT NURSE NOTE - PMH
CVA (cerebral vascular accident)    Guillain-[place] syndrome    HLD (hyperlipidemia)    HTN (hypertension)

## 2019-10-12 ENCOUNTER — EMERGENCY (EMERGENCY)
Facility: HOSPITAL | Age: 44
LOS: 1 days | Discharge: HOME | End: 2019-10-12
Attending: EMERGENCY MEDICINE | Admitting: EMERGENCY MEDICINE
Payer: MEDICAID

## 2019-10-12 VITALS
SYSTOLIC BLOOD PRESSURE: 122 MMHG | DIASTOLIC BLOOD PRESSURE: 79 MMHG | RESPIRATION RATE: 16 BRPM | TEMPERATURE: 99 F | OXYGEN SATURATION: 98 % | HEART RATE: 102 BPM

## 2019-10-12 VITALS
RESPIRATION RATE: 16 BRPM | OXYGEN SATURATION: 98 % | HEART RATE: 102 BPM | DIASTOLIC BLOOD PRESSURE: 87 MMHG | SYSTOLIC BLOOD PRESSURE: 118 MMHG

## 2019-10-12 DIAGNOSIS — Z43.0 ENCOUNTER FOR ATTENTION TO TRACHEOSTOMY: Chronic | ICD-10-CM

## 2019-10-12 PROCEDURE — 99284 EMERGENCY DEPT VISIT MOD MDM: CPT

## 2019-10-12 PROCEDURE — 73552 X-RAY EXAM OF FEMUR 2/>: CPT | Mod: 26,RT

## 2019-10-12 RX ORDER — IBUPROFEN 200 MG
600 TABLET ORAL ONCE
Refills: 0 | Status: COMPLETED | OUTPATIENT
Start: 2019-10-12 | End: 2019-10-12

## 2019-10-12 RX ORDER — OXYCODONE AND ACETAMINOPHEN 5; 325 MG/1; MG/1
1 TABLET ORAL ONCE
Refills: 0 | Status: DISCONTINUED | OUTPATIENT
Start: 2019-10-12 | End: 2019-10-12

## 2019-10-12 RX ORDER — ACETAMINOPHEN 500 MG
650 TABLET ORAL ONCE
Refills: 0 | Status: COMPLETED | OUTPATIENT
Start: 2019-10-12 | End: 2019-10-12

## 2019-10-12 RX ADMIN — Medication 650 MILLIGRAM(S): at 19:02

## 2019-10-12 RX ADMIN — Medication 600 MILLIGRAM(S): at 19:01

## 2019-10-12 RX ADMIN — OXYCODONE AND ACETAMINOPHEN 1 TABLET(S): 5; 325 TABLET ORAL at 15:39

## 2019-10-12 NOTE — ED PROVIDER NOTE - PHYSICAL EXAMINATION
General: NAD, good hygiene, well developed  HENT: Atraumatic, EOMI, no conjunctivae injection, moist mucosa, no post. oropharynx erythema, exudates  Neck: normal ROM and trachea midline   Cardiovascular: RRR, S1&2, no M or R, radial pulses equal and b/l  Respiratory: CTABL, no wheezes or crackles, no decreased breath sounds  Abdominal: soft and non-tender non distended, neg for guarding, coley's sign, rovsing's sign, mcburney's sign, no CVA tenderness   Extremities: no edema of the legs/feet, DP/PT equal b/l  Skin: warm, well perfused  Neurologic: nonfocal, AAOx3  Psych: normal mood and affect
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
## 2019-10-12 NOTE — ED PROVIDER NOTE - NSFOLLOWUPINSTRUCTIONS_ED_ALL_ED_FT
Thank you for visiting our Emergency Department, it has been a pleasure taking part in your healthcare. Please follow up with your primary doctor within x48 hours.    Your discharge diagnosis is: right femur fx  please take over the counter pain medication such as motrin and tylenol for pain and follow up with your primary care doctor.   Please take percocet as needed don't exceed 4 pill a day. Don't take tylenol with percocet.
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
## 2019-10-12 NOTE — ED PROVIDER NOTE - PROGRESS NOTE DETAILS
pt sleeping, no distress. had xray  pending read. Likely dc home w percocet. Endorse to Dr Bustillos

## 2019-10-12 NOTE — PROVIDER CONTACT NOTE (OTHER) - ASSESSMENT
Medical team referred this case as pt needs a BLS as pt is bed bound  and has  home health aide  at bed side. Pt has 24 hour live-in aide from [facility] home health Aide Floresita Huffman (24*7) home care services. Writer called [facility] [phone] and arranged this trip spoke with Tulio trip confirmation # [id number] and she contacted [facility] [phone]  [id number].  Non Emergent ambulance form has been signed by MD and has been attached to the pt's envelope for EMS and also faxed to [facility] [phone]. Writer also made copy of Non Emergent ambulance form and left in ED SW for senior care staff to . Pt's home health will be traveling with the pt's home. pt's requested to contact pt's aunt Magi [phone] and spoke with her and ETA information was given  to her, pt and medical team

## 2019-10-12 NOTE — ED PROVIDER NOTE - PMH
CVA (cerebral vascular accident)    Guillain-[place] syndrome    HLD (hyperlipidemia)    HTN (hypertension)

## 2019-10-12 NOTE — ED PROVIDER NOTE - ATTENDING CONTRIBUTION TO CARE
Attending Statement: I have personally seen and examined this patient. I have fully participated in the care of this patient. I have reviewed all pertinent clinical information, including history physical exam, plan and the Resident's note and agree except as noted  45 y/o male with PMH of Gullian-[place] Syndrome, HTN, CVA with bilateral upper extremity weakness, paraplegic, from home w home aide co pain of right leg. pt dx right femur fracture a day ago at another ED. Was discharged home on a knee immobilizer. States he is in pain since. Took tylenol w min relief. no numbness or weakness. no new trauma. Pt has 24/7 home aide at home.   Vital signs noted. laying in bed, no distress. nontoxic appearing. no resp distress soft nt abdomen +knee immobilizer w ace on right leg. tender mid femur.   plan xr of right femur, pain med and re assess

## 2019-10-12 NOTE — ED ADULT NURSE REASSESSMENT NOTE - NS ED NURSE REASSESS COMMENT FT1
Pt dc by MD.  Pt expressed understanding of DC instructions.  wheeled to exit on [facility] stretcher accompanied by mother.
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
## 2019-10-12 NOTE — ED PROVIDER NOTE - PATIENT PORTAL LINK FT
You can access the FollowMyHealth Patient Portal offered by [facility] by registering at the following website: [email or web address]. By joining [facility]’s FollowMyHealth portal, you will also be able to view your health information using other applications (apps) compatible with our system.

## 2019-10-12 NOTE — ED ADULT NURSE NOTE - NSIMPLEMENTINTERV_GEN_ALL_ED
Implemented All Fall Risk Interventions:  [place] to call system. Call bell, personal items and telephone within reach. Instruct patient to call for assistance. Room bathroom lighting operational. Non-slip footwear when patient is off stretcher. Physically safe environment: no spills, clutter or unnecessary equipment. Stretcher in lowest position, wheels locked, appropriate side rails in place. Provide visual cue, wrist band, yellow gown, etc. Monitor gait and stability. Monitor for mental status changes and reorient to person, place, and time. Review medications for side effects contributing to fall risk. Reinforce activity limits and safety measures with patient and family.
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
## 2019-10-14 ENCOUNTER — EMERGENCY (EMERGENCY)
Facility: HOSPITAL | Age: 44
LOS: 1 days | Discharge: HOME | End: 2019-10-14
Attending: EMERGENCY MEDICINE | Admitting: EMERGENCY MEDICINE
Payer: MEDICAID

## 2019-10-14 VITALS
DIASTOLIC BLOOD PRESSURE: 95 MMHG | HEART RATE: 110 BPM | RESPIRATION RATE: 18 BRPM | OXYGEN SATURATION: 98 % | TEMPERATURE: 98 F | SYSTOLIC BLOOD PRESSURE: 135 MMHG

## 2019-10-14 VITALS
DIASTOLIC BLOOD PRESSURE: 88 MMHG | OXYGEN SATURATION: 99 % | HEART RATE: 99 BPM | RESPIRATION RATE: 16 BRPM | TEMPERATURE: 98 F | SYSTOLIC BLOOD PRESSURE: 121 MMHG

## 2019-10-14 DIAGNOSIS — Z43.0 ENCOUNTER FOR ATTENTION TO TRACHEOSTOMY: Chronic | ICD-10-CM

## 2019-10-14 PROCEDURE — 99283 EMERGENCY DEPT VISIT LOW MDM: CPT

## 2019-10-14 RX ORDER — OXYCODONE HYDROCHLORIDE 5 MG/1
1 TABLET ORAL
Qty: 10 | Refills: 0
Start: 2019-10-14 | End: 2019-10-16

## 2019-10-14 NOTE — ED PROVIDER NOTE - CLINICAL SUMMARY MEDICAL DECISION MAKING FREE TEXT BOX
44 year-old male with a history of CVA, Guillian [place] presents to the Emergency Department for right femur fracture and unable to take care of patient at home with aide; plan to get social work involved.  Distal pulses intact and patient's pain controlled.  Attempted to roll patient on side - successful.  Plan to have social work see and eval. patient.
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
## 2019-10-14 NOTE — ED PROVIDER NOTE - ATTENDING CONTRIBUTION TO CARE
45yo M h/o CVA, prior Guillan Paul syndrome which has left him bedbound, recent ER visit several days ago after fall with closed R femur fracture sustained, no operative intervention decided upon given pt's poor functional status, presenting to ED today with family and home aides reporting difficulty caring for patient at home, specifically to turn him to clean him after defecation since fracture and also uncontrolled pain to RLE.  No edema to RLE or fevers or sob or chest pain.  Taking oxycodone at home which helps pain. Exam with RLE in immobilizer, no edema, 2+DP pulse, compartments soft.  pt rolled by PGY3 resident in ER with family and now feel comfortable rolling at home. social work to see. plan to d/c home

## 2019-10-14 NOTE — ED ADULT NURSE NOTE - OBJECTIVE STATEMENT
Pt has fractured femur--pt was seen and discharged 2 days ago--Pt returned because family unable to care for him. Pt alert and orientated x3--pt had #20 placed lt wrist area--labs drawn and sent. Pt requesting rehab. Awaiting MD exam

## 2019-10-14 NOTE — ED ADULT NURSE REASSESSMENT NOTE - NS ED NURSE REASSESS COMMENT FT1
report received from KODY Kenneth. pt resting comfortably in stretcher, VSS, denies pain, cleared by SW, no further interventions at this time, will monitor

## 2019-10-14 NOTE — ED PROVIDER NOTE - PMH
CVA (cerebral vascular accident)    Guillain-[place] syndrome    HLD (hyperlipidemia)    HTN (hypertension)

## 2019-10-14 NOTE — ED PROVIDER NOTE - OBJECTIVE STATEMENT
44 year-old male with a history of CVA, Guillian [place] presents to the Emergency Department for right femur fracture.  Patient mentions he was being wheeled up the [employer] 4 days ago when he fell down.  Patient brought to ER due to concerns for unable to take care at home which involves unable to clean for defecation.  No fevers, chills, nausea, vomiting, chest pain, shortness of breath, abdominal pain.  Patient reports pain controlled at home with Oxycodone.  Patient is non-ambulatory at baseline.

## 2019-10-14 NOTE — ED PROVIDER NOTE - CARE PLAN
Principal Discharge DX:	Thigh pain  Secondary Diagnosis:	Femur fracture, left Principal Discharge DX:	Thigh pain  Secondary Diagnosis:	Closed fracture of right femur with routine healing, unspecified fracture morphology, unspecified portion of femur, subsequent encounter

## 2019-10-14 NOTE — ED ADULT TRIAGE NOTE - CHIEF COMPLAINT QUOTE
Pt was seen in this ED 2 days ago and dx with femur fx non operative, family unable to care for patient at home.  PMH CVA

## 2019-10-14 NOTE — ED PROVIDER NOTE - PHYSICAL EXAMINATION
*Gen: NAD, AAO*3  *HEENT: NC/AT, MMM, airway patent, trachea midline  *CV: RRR, S1/S2 present, no murmurs/rubs  *Resp: no respiratory distress, LCTAB, no wheezing/rales  *Abd: non-distended, soft N/Tx4, no guarding or rigidity  *Neuro: no focal neuro deficits  *Extremities: no gross deformity; distal pulses intact; dec. strength b/l (baseline)  *Skin: no rashes, no wounds   ~ Yi Barrientos M.D.

## 2019-10-14 NOTE — ED PROVIDER NOTE - SECONDARY DIAGNOSIS.
Femur fracture, left Closed fracture of right femur with routine healing, unspecified fracture morphology, unspecified portion of femur, subsequent encounter

## 2019-10-14 NOTE — ED ADULT NURSE NOTE - NSIMPLEMENTINTERV_GEN_ALL_ED
Implemented All Fall with Harm Risk Interventions:  [place] to call system. Call bell, personal items and telephone within reach. Instruct patient to call for assistance. Room bathroom lighting operational. Non-slip footwear when patient is off stretcher. Physically safe environment: no spills, clutter or unnecessary equipment. Stretcher in lowest position, wheels locked, appropriate side rails in place. Provide visual cue, wrist band, yellow gown, etc. Monitor gait and stability. Monitor for mental status changes and reorient to person, place, and time. Review medications for side effects contributing to fall risk. Reinforce activity limits and safety measures with patient and family. Provide visual clues: red socks.

## 2019-10-14 NOTE — ED ADULT NURSE NOTE - PMH
CVA (cerebral vascular accident)    Guillain-[place] syndrome    HLD (hyperlipidemia)    HTN (hypertension)

## 2019-10-14 NOTE — ED PROVIDER NOTE - PATIENT PORTAL LINK FT
You can access the FollowMyHealth Patient Portal offered by [facility] by registering at the following website: [email or web address]. By joining [employer]’s FollowMyHealth portal, you will also be able to view your health information using other applications (apps) compatible with our system.

## 2019-10-14 NOTE — ED PROVIDER NOTE - NSFOLLOWUPINSTRUCTIONS_ED_ALL_ED_FT
You were seen in the Emergency Department for right hip pain from a recent femur fracture.    Can use Tylenol (up to 1000mg every 6 hours) or Ibuprofen (up to 600mg every 6 hours) as per package directions for pain - use only as needed.  These are over-the-counter medications - please respect the warnings on the label.     Use Oxycodone only as needed for severe pain - can cause lightheadedness.  Please follow directions on label.    Please note that Percocet has oxycodone and Tylenol (acetaminophen).  If you take a Percocet, please only take 500mg of Tylenol to refrain from taking too much Tylenol at home (max daily  = 4000mg)

## 2019-12-10 NOTE — ED ADULT TRIAGE NOTE - PAIN RATING/NUMBER SCALE (0-10): ACTIVITY
Please bring in a copy of your advance directive at your next visit, or drop off a copy at any [facility] facility so that it can be added to your medical record.     
6

## 2022-05-10 ENCOUNTER — EMERGENCY (EMERGENCY)
Facility: HOSPITAL | Age: 47
LOS: 1 days | Discharge: HOME | End: 2022-05-10
Attending: EMERGENCY MEDICINE | Admitting: EMERGENCY MEDICINE
Payer: MEDICAID

## 2022-05-10 VITALS
TEMPERATURE: 100 F | RESPIRATION RATE: 18 BRPM | OXYGEN SATURATION: 96 % | HEIGHT: 65 IN | SYSTOLIC BLOOD PRESSURE: 139 MMHG | DIASTOLIC BLOOD PRESSURE: 86 MMHG | HEART RATE: 85 BPM

## 2022-05-10 DIAGNOSIS — Z43.0 ENCOUNTER FOR ATTENTION TO TRACHEOSTOMY: Chronic | ICD-10-CM

## 2022-05-10 PROCEDURE — 99283 EMERGENCY DEPT VISIT LOW MDM: CPT

## 2022-05-10 RX ORDER — TRANEXAMIC ACID 100 MG/ML
5 INJECTION, SOLUTION INTRAVENOUS ONCE
Refills: 0 | Status: COMPLETED | OUTPATIENT
Start: 2022-05-10 | End: 2022-05-10

## 2022-05-10 RX ADMIN — TRANEXAMIC ACID 5 MILLILITER(S): 100 INJECTION, SOLUTION INTRAVENOUS at 18:31

## 2022-05-10 NOTE — ED PROVIDER NOTE - PATIENT PORTAL LINK FT
You can access the FollowMyHealth Patient Portal offered by [facility] by registering at the following website: [email or web address]. By joining [employer]’s FollowMyHealth portal, you will also be able to view your health information using other applications (apps) compatible with our system.

## 2022-05-10 NOTE — PROVIDER CONTACT NOTE (OTHER) - ASSESSMENT
[facility] [phone]. Trip# [id number]. P/U 8:30. MAS Auth# Inv# [id number]. Pt is going home with A.

## 2022-05-10 NOTE — ED PROVIDER NOTE - NSICDXPASTMEDICALHX_GEN_ALL_CORE_FT
PAST MEDICAL HISTORY:  CVA (cerebral vascular accident)     Guillain-[place] syndrome     HLD (hyperlipidemia)     HTN (hypertension)

## 2022-05-10 NOTE — ED PROVIDER NOTE - CLINICAL SUMMARY MEDICAL DECISION MAKING FREE TEXT BOX
Bleeding wound after procedure - surgicel and compression dressing first, if not successful attempt suture or TXA.

## 2022-05-10 NOTE — ED PROVIDER NOTE - PROGRESS NOTE DETAILS
[place] PGY1   Surgicel placed with compressive dressing, no bleeding noted after 20 minutes.   Will DC.   SW to arrange for transport back home.

## 2022-05-10 NOTE — ED ADULT TRIAGE NOTE - CHIEF COMPLAINT QUOTE
Patient brought to ER by EMS from home for c/o left toe bleeding. He had his toe cut yesterday and it has not stopped bleeding since.

## 2022-05-10 NOTE — ED PROVIDER NOTE - NSFOLLOWUPINSTRUCTIONS_ED_ALL_ED_FT
You were seen in the emergency department for left toe pain and bleeding. Bleeding stopped after placement of surgicel. Please follow up with your podiatrist by next Monday for further management.     Return to the emergency department if you experience any new/concerning/worsening symptoms such as but not limited to: fever (>100.3F), intractable nausea, vomiting, chest pain, shortness of breath.

## 2022-05-27 ENCOUNTER — INPATIENT (INPATIENT)
Facility: HOSPITAL | Age: 47
LOS: 6 days | Discharge: HOME | End: 2022-06-03
Attending: UROLOGY | Admitting: UROLOGY
Payer: MEDICAID

## 2022-05-27 VITALS
DIASTOLIC BLOOD PRESSURE: 71 MMHG | TEMPERATURE: 99 F | RESPIRATION RATE: 17 BRPM | HEART RATE: 96 BPM | SYSTOLIC BLOOD PRESSURE: 95 MMHG | HEIGHT: 65 IN | OXYGEN SATURATION: 99 %

## 2022-05-27 DIAGNOSIS — Z43.0 ENCOUNTER FOR ATTENTION TO TRACHEOSTOMY: Chronic | ICD-10-CM

## 2022-05-27 DIAGNOSIS — R58 HEMORRHAGE, NOT ELSEWHERE CLASSIFIED: ICD-10-CM

## 2022-05-27 DIAGNOSIS — S37.011A MINOR CONTUSION OF RIGHT KIDNEY, INITIAL ENCOUNTER: ICD-10-CM

## 2022-05-27 LAB
ALBUMIN SERPL ELPH-MCNC: 3.8 G/DL — SIGNIFICANT CHANGE UP (ref 3.3–5)
ALP SERPL-CCNC: 122 U/L — HIGH (ref 40–120)
ALT FLD-CCNC: 18 U/L — SIGNIFICANT CHANGE UP (ref 4–41)
ANION GAP SERPL CALC-SCNC: 12 MMOL/L — SIGNIFICANT CHANGE UP (ref 7–14)
APTT BLD: 23.2 SEC — LOW (ref 27–36.3)
AST SERPL-CCNC: 9 U/L — SIGNIFICANT CHANGE UP (ref 4–40)
BASE EXCESS BLDV CALC-SCNC: 1.4 MMOL/L — SIGNIFICANT CHANGE UP (ref -2–3)
BASOPHILS # BLD AUTO: 0.03 K/UL — SIGNIFICANT CHANGE UP (ref 0–0.2)
BASOPHILS # BLD AUTO: 0.03 K/UL — SIGNIFICANT CHANGE UP (ref 0–0.2)
BASOPHILS NFR BLD AUTO: 0.2 % — SIGNIFICANT CHANGE UP (ref 0–2)
BASOPHILS NFR BLD AUTO: 0.2 % — SIGNIFICANT CHANGE UP (ref 0–2)
BILIRUB SERPL-MCNC: 1.2 MG/DL — SIGNIFICANT CHANGE UP (ref 0.2–1.2)
BLD GP AB SCN SERPL QL: NEGATIVE — SIGNIFICANT CHANGE UP
BUN SERPL-MCNC: 25 MG/DL — HIGH (ref 7–23)
CA-I SERPL-SCNC: 1.11 MMOL/L — LOW (ref 1.15–1.33)
CALCIUM SERPL-MCNC: 8.5 MG/DL — SIGNIFICANT CHANGE UP (ref 8.4–10.5)
CHLORIDE BLDV-SCNC: 95 MMOL/L — LOW (ref 96–108)
CHLORIDE SERPL-SCNC: 93 MMOL/L — LOW (ref 98–107)
CO2 BLDV-SCNC: 28.6 MMOL/L — HIGH (ref 22–26)
CO2 SERPL-SCNC: 25 MMOL/L — SIGNIFICANT CHANGE UP (ref 22–31)
CREAT SERPL-MCNC: 2.07 MG/DL — HIGH (ref 0.5–1.3)
EGFR: 39 ML/MIN/1.73M2 — LOW
EOSINOPHIL # BLD AUTO: 0.15 K/UL — SIGNIFICANT CHANGE UP (ref 0–0.5)
EOSINOPHIL # BLD AUTO: 0.15 K/UL — SIGNIFICANT CHANGE UP (ref 0–0.5)
EOSINOPHIL NFR BLD AUTO: 1 % — SIGNIFICANT CHANGE UP (ref 0–6)
EOSINOPHIL NFR BLD AUTO: 1.1 % — SIGNIFICANT CHANGE UP (ref 0–6)
GAS PNL BLDV: 128 MMOL/L — LOW (ref 136–145)
GAS PNL BLDV: SIGNIFICANT CHANGE UP
GLUCOSE BLDV-MCNC: 124 MG/DL — HIGH (ref 70–99)
GLUCOSE SERPL-MCNC: 123 MG/DL — HIGH (ref 70–99)
HCO3 BLDV-SCNC: 27 MMOL/L — SIGNIFICANT CHANGE UP (ref 22–29)
HCT VFR BLD CALC: 32.1 % — LOW (ref 39–50)
HCT VFR BLD CALC: 34.8 % — LOW (ref 39–50)
HCT VFR BLDA CALC: 35 % — LOW (ref 39–51)
HGB BLD CALC-MCNC: 11.6 G/DL — LOW (ref 13–17)
HGB BLD-MCNC: 10.4 G/DL — LOW (ref 13–17)
HGB BLD-MCNC: 11.2 G/DL — LOW (ref 13–17)
IANC: 10.43 K/UL — HIGH (ref 1.8–7.4)
IANC: 12.45 K/UL — HIGH (ref 1.8–7.4)
IMM GRANULOCYTES NFR BLD AUTO: 0.6 % — SIGNIFICANT CHANGE UP (ref 0–1.5)
IMM GRANULOCYTES NFR BLD AUTO: 0.7 % — SIGNIFICANT CHANGE UP (ref 0–1.5)
INR BLD: 1.17 RATIO — HIGH (ref 0.88–1.16)
LACTATE BLDV-MCNC: 1.2 MMOL/L — SIGNIFICANT CHANGE UP (ref 0.5–2)
LIDOCAIN IGE QN: 18 U/L — SIGNIFICANT CHANGE UP (ref 7–60)
LYMPHOCYTES # BLD AUTO: 1.62 K/UL — SIGNIFICANT CHANGE UP (ref 1–3.3)
LYMPHOCYTES # BLD AUTO: 1.71 K/UL — SIGNIFICANT CHANGE UP (ref 1–3.3)
LYMPHOCYTES # BLD AUTO: 10.4 % — LOW (ref 13–44)
LYMPHOCYTES # BLD AUTO: 12.7 % — LOW (ref 13–44)
MCHC RBC-ENTMCNC: 27.8 PG — SIGNIFICANT CHANGE UP (ref 27–34)
MCHC RBC-ENTMCNC: 28 PG — SIGNIFICANT CHANGE UP (ref 27–34)
MCHC RBC-ENTMCNC: 32.2 GM/DL — SIGNIFICANT CHANGE UP (ref 32–36)
MCHC RBC-ENTMCNC: 32.4 GM/DL — SIGNIFICANT CHANGE UP (ref 32–36)
MCV RBC AUTO: 86.3 FL — SIGNIFICANT CHANGE UP (ref 80–100)
MCV RBC AUTO: 86.4 FL — SIGNIFICANT CHANGE UP (ref 80–100)
MONOCYTES # BLD AUTO: 1.08 K/UL — HIGH (ref 0–0.9)
MONOCYTES # BLD AUTO: 1.21 K/UL — HIGH (ref 0–0.9)
MONOCYTES NFR BLD AUTO: 7.8 % — SIGNIFICANT CHANGE UP (ref 2–14)
MONOCYTES NFR BLD AUTO: 8 % — SIGNIFICANT CHANGE UP (ref 2–14)
NEUTROPHILS # BLD AUTO: 10.43 K/UL — HIGH (ref 1.8–7.4)
NEUTROPHILS # BLD AUTO: 12.45 K/UL — HIGH (ref 1.8–7.4)
NEUTROPHILS NFR BLD AUTO: 77.3 % — HIGH (ref 43–77)
NEUTROPHILS NFR BLD AUTO: 80 % — HIGH (ref 43–77)
NRBC # BLD: 0 /100 WBCS — SIGNIFICANT CHANGE UP
NRBC # BLD: 0 /100 WBCS — SIGNIFICANT CHANGE UP
NRBC # FLD: 0 K/UL — SIGNIFICANT CHANGE UP
NRBC # FLD: 0 K/UL — SIGNIFICANT CHANGE UP
PCO2 BLDV: 47 MMHG — SIGNIFICANT CHANGE UP (ref 42–55)
PH BLDV: 7.37 — SIGNIFICANT CHANGE UP (ref 7.32–7.43)
PLATELET # BLD AUTO: 251 K/UL — SIGNIFICANT CHANGE UP (ref 150–400)
PLATELET # BLD AUTO: 286 K/UL — SIGNIFICANT CHANGE UP (ref 150–400)
PO2 BLDV: 58 MMHG — SIGNIFICANT CHANGE UP
POTASSIUM BLDV-SCNC: 3.9 MMOL/L — SIGNIFICANT CHANGE UP (ref 3.5–5.1)
POTASSIUM SERPL-MCNC: 4.1 MMOL/L — SIGNIFICANT CHANGE UP (ref 3.5–5.3)
POTASSIUM SERPL-SCNC: 4.1 MMOL/L — SIGNIFICANT CHANGE UP (ref 3.5–5.3)
PROT SERPL-MCNC: 6.9 G/DL — SIGNIFICANT CHANGE UP (ref 6–8.3)
PROTHROM AB SERPL-ACNC: 13.6 SEC — HIGH (ref 10.5–13.4)
RBC # BLD: 3.72 M/UL — LOW (ref 4.2–5.8)
RBC # BLD: 4.03 M/UL — LOW (ref 4.2–5.8)
RBC # FLD: 14.6 % — HIGH (ref 10.3–14.5)
RBC # FLD: 14.6 % — HIGH (ref 10.3–14.5)
RH IG SCN BLD-IMP: POSITIVE — SIGNIFICANT CHANGE UP
SAO2 % BLDV: 90 % — SIGNIFICANT CHANGE UP
SARS-COV-2 RNA SPEC QL NAA+PROBE: SIGNIFICANT CHANGE UP
SODIUM SERPL-SCNC: 130 MMOL/L — LOW (ref 135–145)
WBC # BLD: 13.49 K/UL — HIGH (ref 3.8–10.5)
WBC # BLD: 15.56 K/UL — HIGH (ref 3.8–10.5)
WBC # FLD AUTO: 13.49 K/UL — HIGH (ref 3.8–10.5)
WBC # FLD AUTO: 15.56 K/UL — HIGH (ref 3.8–10.5)

## 2022-05-27 PROCEDURE — 74022 RADEX COMPL AQT ABD SERIES: CPT | Mod: 26

## 2022-05-27 PROCEDURE — 74177 CT ABD & PELVIS W/CONTRAST: CPT | Mod: 26,MA

## 2022-05-27 PROCEDURE — 36620 INSERTION CATHETER ARTERY: CPT | Mod: GC

## 2022-05-27 PROCEDURE — 99291 CRITICAL CARE FIRST HOUR: CPT | Mod: 25

## 2022-05-27 PROCEDURE — 99285 EMERGENCY DEPT VISIT HI MDM: CPT

## 2022-05-27 RX ORDER — ONDANSETRON 8 MG/1
4 TABLET, FILM COATED ORAL ONCE
Refills: 0 | Status: COMPLETED | OUTPATIENT
Start: 2022-05-27 | End: 2022-05-27

## 2022-05-27 RX ORDER — FERROUS SULFATE 325(65) MG
1 TABLET ORAL
Qty: 0 | Refills: 0 | DISCHARGE

## 2022-05-27 RX ORDER — DOCUSATE SODIUM 100 MG
1 CAPSULE ORAL
Qty: 0 | Refills: 0 | DISCHARGE

## 2022-05-27 RX ORDER — ALBUTEROL 90 UG/1
2 AEROSOL, METERED ORAL
Qty: 0 | Refills: 0 | DISCHARGE

## 2022-05-27 RX ORDER — SIMVASTATIN 20 MG/1
1 TABLET, FILM COATED ORAL
Qty: 0 | Refills: 0 | DISCHARGE

## 2022-05-27 RX ORDER — FENTANYL CITRATE 50 UG/ML
25 INJECTION INTRAVENOUS ONCE
Refills: 0 | Status: DISCONTINUED | OUTPATIENT
Start: 2022-05-27 | End: 2022-05-27

## 2022-05-27 RX ORDER — MORPHINE SULFATE 50 MG/1
4 CAPSULE, EXTENDED RELEASE ORAL ONCE
Refills: 0 | Status: DISCONTINUED | OUTPATIENT
Start: 2022-05-27 | End: 2022-05-27

## 2022-05-27 RX ORDER — CARVEDILOL PHOSPHATE 80 MG/1
1 CAPSULE, EXTENDED RELEASE ORAL
Qty: 0 | Refills: 0 | DISCHARGE

## 2022-05-27 RX ORDER — SODIUM CHLORIDE 9 MG/ML
1000 INJECTION, SOLUTION INTRAVENOUS ONCE
Refills: 0 | Status: COMPLETED | OUTPATIENT
Start: 2022-05-27 | End: 2022-05-27

## 2022-05-27 RX ORDER — SODIUM CHLORIDE 9 MG/ML
1000 INJECTION, SOLUTION INTRAVENOUS
Refills: 0 | Status: DISCONTINUED | OUTPATIENT
Start: 2022-05-27 | End: 2022-05-30

## 2022-05-27 RX ADMIN — FENTANYL CITRATE 25 MICROGRAM(S): 50 INJECTION INTRAVENOUS at 16:56

## 2022-05-27 RX ADMIN — FENTANYL CITRATE 25 MICROGRAM(S): 50 INJECTION INTRAVENOUS at 14:23

## 2022-05-27 RX ADMIN — ONDANSETRON 4 MILLIGRAM(S): 8 TABLET, FILM COATED ORAL at 14:12

## 2022-05-27 RX ADMIN — SODIUM CHLORIDE 1000 MILLILITER(S): 9 INJECTION, SOLUTION INTRAVENOUS at 14:12

## 2022-05-27 NOTE — ED ADULT NURSE NOTE - OBJECTIVE STATEMENT
Patient wants to speak directly to Gavi Katz and not a nurse. Please call patient back today.   Patient received to ED room 20. A&O x 4 bedbound. Patient c/o Lower right quadrant abdominal pain that radiates to the right side of back. Patient reports 10/10 pain level. Patient reports vomiting at midnight and 0400 this morning. Patient's last bowel movement was yesterday. Patient denies diarrhea, nausea, SOB, dizziness or chest pain. Abdomen rigid to touch. Abdominal distention noted during assessment. Patient states he is unable to urinate. Call bell within reach. Visitor at bedside. IV 20G placed to left AC. Patient medicated as per MD orders. Skin is dry and intact to sacrum. Patient has an ingrown toenail wound on his left big toe. Noted dry blood, not actively bleeding. Will continue to monitor.

## 2022-05-27 NOTE — ED PROVIDER NOTE - CLINICAL SUMMARY MEDICAL DECISION MAKING FREE TEXT BOX
Parth - Pt is a 46 year-old male with a history of CVA, Guillian [place] now bedbound who presents with abdominal pain. Concern for SBO vs kidney stone vs uti vs constipation. Will check labs, CT abd, pain meds, zofran and fluids Par[place] - Pt is a 46 year-old male with a history of CVA, MARGRET Bustos renal subcapsular hematoma (2017), now bedbound who presents with abdominal pain. Concern for SBO vs kidney stone vs uti vs constipation. Will check labs, CT abd, pain meds, zofran and fluids

## 2022-05-27 NOTE — ED PROVIDER NOTE - ATTENDING CONTRIBUTION TO CARE
46 year old male with abd pain for 3 days, RLQ tenderness with right flank tenderness on exam. Labs and imaging noted, surgery consulted and pt TBA to SICU.

## 2022-05-27 NOTE — H&P ADULT - ASSESSMENT
46 male with h/o Guillain barre, CVA, bedbound and no movement of lower extremities, and Left renal subcapsular hematoma 9/2017, presenting to the ED with right sided pain in the lower abdomen, flank and back x 2 days, R renal subcapsular hematoma on CT, and hemodynamic instability with hypotension and tachycardia requiring transfusion and SICU admission.

## 2022-05-27 NOTE — H&P ADULT - NSHPPHYSICALEXAM_GEN_ALL_CORE
Vital Signs Last 24 Hrs  T(C): 37.4 (27 May 2022 19:29), Max: 37.4 (27 May 2022 19:29)  T(F): 99.4 (27 May 2022 19:29), Max: 99.4 (27 May 2022 19:29)  HR: 104 (27 May 2022 19:29) (94 - 105)  BP: 75/55 (27 May 2022 19:29) (75/55 - 97/64)  BP(mean): --  RR: 16 (27 May 2022 19:29) (16 - 20)  SpO2: 98% (27 May 2022 19:29) (98% - 100%)  GENERAL: nontoxic appearing, A+O   PULM: clear anteriorly  CV: tachycardic, regular   ABDOMEN: distended, RLQ tenderness, firmness and tenderness to flank on right side.  No ecchymosis appreciated.  EXTREMITIES: sensation but no strength of ability to move bilaterally.  Nontender.

## 2022-05-27 NOTE — H&P ADULT - NSHPLABSRESULTS_GEN_ALL_CORE
10.4   13.49 )-----------( 251      ( 27 May 2022 18:00 )             32.1   27 May 2022 14:44    130    |  93     |  25     ----------------------------<  123    4.1     |  25     |  2.07     Ca    8.5        27 May 2022 14:44    TPro  6.9    /  Alb  3.8    /  TBili  1.2    /  DBili  x      /  AST  9      /  ALT  18     /  AlkPhos  122    27 May 2022 14:44    < from: CT Abdomen and Pelvis w/ IV Cont (05.27.22 @ 17:24) >    INDINGS:  LOWER CHEST: Bibasilar subsegmental atelectasis. Bilateral symmetric   gynecomastia.    LIVER: Within normal limits.  BILE DUCTS: Normal caliber.  GALLBLADDER: Within normal limits.  SPLEEN: Within normal limits.  PANCREAS: Within normal limits.  ADRENALS: Within normal limits.  KIDNEYS/URETERS: Right subcapsular hematoma measuring 8.4 x 7.8 x 15.6   cm. Increased size of indeterminate 1.8 cm left interpolar   hyperattenuating focus, previously 1.3 cm and demonstrating contrast   enhancement on prior CT. No hydronephrosis. Bilateral nonobstructing   renal calculi measuring 0.5 cm on the right and 0.4 cm on the left.    BLADDER: Within normal limits.  REPRODUCTIVE ORGANS: Prostate within normal limits.    BOWEL: No bowel obstruction. Appendix is normal.  PERITONEUM: No ascites.  VESSELS: Within normal limits.  RETROPERITONEUM/LYMPH NODES: Right anterior pararenal space hematoma   measuring 10.9 x 5.6 x 11.1 hemorrhage tracking into the right lower   abdomen, pelvis, and a right inguinal hernia. No lymphadenopathy.  ABDOMINAL WALL: Within normal limits.  BONES: Degenerative changes.    IMPRESSION:  Large right subcapsular renal hematoma. Correlate clinically for Page   kidney.    Large right anterior pararenal space hematoma with retroperitoneal   hemorrhage tracking into the right lower abdomen, pelvis, and a right   inguinal hernia.    Increased size of indeterminate 1.8 cm left interpolar renal lesion,   previously demonstrating enhancement and measuring 1.3 cm, suspicious for   neoplasm. Consider further evaluation with contrast-enhanced MRI abdomen.    These findings were discussed with Dr. Glass at 5/27/2022 5:39 PM by Dr. Cadena with read back.    --- End of Report ---  NELLY CADENA MD; Resident Radiology  This document has been electronically signed.  WIL PEREZ MD; Attending Radiologist  This document has been electronically signed. May 27 2022  6:14PM    < end of copied text >

## 2022-05-27 NOTE — ED ADULT TRIAGE NOTE - CHIEF COMPLAINT QUOTE
pt c/o RLQ pain radiating to the back since yesterday, worsening with n/v. took Tylenol with no relief.  no diarrhea, urinary symptoms, fever. hx. guillain bare, stroke, pt bedbound.

## 2022-05-27 NOTE — H&P ADULT - PROBLEM SELECTOR PLAN 1
-Admit to Urology under Dr. Holly.  -Transfuse prn, monitor CBC  -IR consulted for acute intervention  -Heme consult for underlying coagulopathy.  -SICU for monitoring and further resuscitation.  -Discussed with Dr. Holly -Admit to Urology under Dr. Holly.  -Transfuse prn, monitor CBC  -Park catheter  -IR consulted for acute intervention  -Heme consult for underlying coagulopathy.  -SICU for monitoring and further resuscitation.  -Discussed with Dr. Holly

## 2022-05-27 NOTE — ED PROVIDER NOTE - OBJECTIVE STATEMENT
Pt is a 46 year-old male with a history of CVA, Guillian [place] now bedbound who presents with abdominal pain. RLQ and right lower back. Started 2 days ago, getting progressively worse. Endorses N/V, multiple episodes, nonbloody. Decreased PO intake and decreased urine outpt. Last BM was this morning but was small pellets of stool. No melena or BRBPR. Denies dysuria or hematuria, CP, SOB Pt is a 46 year-old male with a history of L. renal subcapsular hematoma (2017), CVA, Guillian [place] now bedbound who presents with abdominal pain. RLQ and right lower back. Started 2 days ago, getting progressively worse. Endorses N/V, multiple episodes, nonbloody. Decreased PO intake and decreased urine outpt. Last BM was this morning but was small pellets of stool. No melena or BRBPR. Denies dysuria or hematuria, CP, SOB

## 2022-05-27 NOTE — PATIENT PROFILE ADULT - FALL HARM RISK - HARM RISK INTERVENTIONS
Assistance OOB with selected safe patient handling equipment/Communicate Risk of Fall with Harm to all staff/Discuss with provider need for PT consult/Monitor gait and stability/Provide patient with walking aids - walker, cane, crutches/Reinforce activity limits and safety measures with patient and family/Tailored Fall Risk Interventions/Visual Cue: Yellow wristband and red socks/Bed in lowest position, wheels locked, appropriate side rails in place/Call bell, personal items and telephone in reach/Instruct patient to call for assistance before getting out of bed or chair/Non-slip footwear when patient is out of bed/[place] to call system/Physically safe environment - no spills, clutter or unnecessary equipment/Purposeful Proactive Rounding/Room/bathroom lighting operational, light cord in reach
Advance activity as tolerated.  Continue all previously prescribed medications as directed unless otherwise instructed.  Follow up with your primary care physician in 48-72 hours- bring copies of your results.  Return to the ER for worsening or persistent symptoms, and/or ANY NEW OR CONCERNING SYMPTOMS. If you have issues obtaining follow up, please call: [phone] to obtain a doctor or specialist who takes your insurance in your area.  You may call [phone] to make an appointment with the internal medicine clinic. Take Promethazine w/ Codeine 5mL once every 6 hours as needed for cough. -- causes drowsiness; DO NOT drink alcohol, drive, or operate heavy machinery with this medication.  Caution federal law prohibits the transfer of this drug to any person other  than the person for whom it was prescribed. May cause drowsiness.  Alcohol may intensify this effect.  Use care when operating dangerous machinery.  This prescription cannot be refilled. Using more of this medication than prescribed may cause serious breathing problems

## 2022-05-27 NOTE — ED PROVIDER NOTE - NSICDXPASTMEDICALHX_GEN_ALL_CORE_FT
PAST MEDICAL HISTORY:  CVA (cerebral vascular accident)     Guillain-[place] syndrome     HLD (hyperlipidemia)     HTN (hypertension)

## 2022-05-27 NOTE — CONSULT NOTE ADULT - SUBJECTIVE AND OBJECTIVE BOX
SICU Consultation Note  =====================================================  HPI:  46 male with h/o Guillain barre, CVA, bedbound and no movement of lower extremities, and Left renal subcapsular hematoma 9/2017, presenting to the ED with right sided pain in the lower abdomen, flank and back x 2 days.  Complains of nausea with emesis.  Reports little to no urge to void over the past 24 hours.  Home attendant at the bedside confirms patient has been oliguric over the past 24 hours.  Denies hematuria or dysuria.  Denies fevers.  Initial presentation, patient is hypotensive with BP in the 90s with a normal heart rate.  Hgb 11.2, HCt 34.8.  CT performed showing a right renal subcapsular hematoma.  Patient became tachycardic, and more hypotensive with SBP in the 70s.  Repeat HGB 10.4 HCT 32.1. Patient requiring blood transfusions, and admission to the SICU.  Patient had a left renal subcapsular hematoma 9/2017, requiring transfusions at that time, and ultimately IR intervention without findings of active bleeding.  Patient recently had a toenail removed, and reports excessive bleeding post procedure requiring Surgicel and pressure dressing.  He denies other episodes of poor clotting, bleeding, or easy bruising. He only takes ASA 81mg, denies other forms of anticoagulation/antiplatelet.    (27 May 2022 20:16)      PAST MEDICAL & SURGICAL HISTORY:  Guillain-[place] syndrome      HTN (hypertension)      HLD (hyperlipidemia)      CVA (cerebral vascular accident)      Renal hematoma  left, 2017, s/p IR angio with no sign of bleed      Tracheostomy, acute management        HOME MEDS:  Home Medications:  aspirin 81 mg oral tablet: 1 tab(s) orally once a day (27 May 2022 20:26)  carvedilol 6.25 mg oral tablet: 1 tab(s) orally 2 times a day (27 May 2022 20:27)  Lipitor 40 mg oral tablet: 1 tab(s) orally once a day (27 May 2022 20:26)  lisinopril 2.5 mg oral tablet: 1 tab(s) orally once a day (27 May 2022 20:27)    ALLERGIES:  Allergies    apple (Vomiting)  penicillin (Hives)    Intolerances      SOCIAL:   ADVANCED DIRECTIVES: Presumed Full Code     REVIEW OF SYSTEMS:  REVIEW OF SYSTEMS    [x] A ten-point review of systems was otherwise negative except as noted.  [ ] Due to altered mental status/intubation, subjective information were not able to be obtained from the patient. History was obtained, to the extent possible, from review of the chart and collateral sources of information.      CURRENT MEDICATIONS:   --------------------------------------------------------------------------------------  Neurologic Medications    Respiratory Medications    Cardiovascular Medications    Gastrointestinal Medications    Genitourinary Medications    Hematologic/Oncologic Medications    Antimicrobial/Immunologic Medications    Endocrine/Metabolic Medications    Topical/Other Medications    --------------------------------------------------------------------------------------    VITAL SIGNS, INS/OUTS (last 24 hours):  --------------------------------------------------------------------------------------  ICU Vital Signs Last 24 Hrs  T(C): 37.1 (27 May 2022 20:24), Max: 37.4 (27 May 2022 19:29)  T(F): 98.8 (27 May 2022 20:24), Max: 99.4 (27 May 2022 19:29)  HR: 104 (27 May 2022 20:24) (94 - 105)  BP: 96/65 (27 May 2022 20:24) (75/55 - 97/64)  BP(mean): 75 (27 May 2022 20:24) (75 - 75)  ABP: --  ABP(mean): --  RR: 17 (27 May 2022 20:24) (16 - 20)  SpO2: 99% (27 May 2022 20:24) (98% - 100%)    I&O's Summary    --------------------------------------------------------------------------------------    EXAM:    General/Neuro  GCS: 15  Exam: Normal, NAD, alert, oriented x 3, voluntary movement of all extremities, limited in strength 4/5 b/l upper extremities and 3/5 b/l lower extremities, sensation intact throughout    Respiratory  Exam: Lungs clear to auscultation, Normal expansion/effort. SpO2 98% on RA    Cardiovascular  Exam: S1, S2.  Regular rate and rhythm.  Peripheral edema, lower extremities cool to touch with good cap refill  Cardiac Rhythm: Normal Sinus Rhythm    GI  Exam: Abdomen firm, diffusely ttp >RLQ, distended.     Tubes/Lines/Drains   [x] Peripheral IV  [] Central Venous Line     	[] R	[] L	[] IJ	[] Fem	[] SC        Type:	    Date Placed:   [] Arterial Line		[] R	[] L	[] Fem	[] Rad	[] Ax	Date Placed:   [] PICC:         	[] Midline		[] Mediport           [] Urinary Catheter		Date Placed:     Extremities  Exam: Extremities warm, pink, well-perfused.      Derm:  Exam: Good skin turgor, no skin breakdown.      :   Exam: Park catheter in place.     --------------------------------------------------------------------------------------  Labs:  CAPILLARY BLOOD GLUCOSE                              10.4   13.49 )-----------( 251      ( 27 May 2022 18:00 )             32.1       Auto Immature Granulocyte %: 0.7 % (05-27-22 @ 18:00)  Auto Neutrophil %: 77.3 % (05-27-22 @ 18:00)  Auto Immature Granulocyte %: 0.6 % (05-27-22 @ 14:44)  Auto Neutrophil %: 80.0 % (05-27-22 @ 14:44)    05-27    130<L>  |  93<L>  |  25<H>  ----------------------------<  123<H>  4.1   |  25  |  2.07<H>      Calcium, Total Serum: 8.5 mg/dL (05-27-22 @ 14:44)      LFTs:             6.9  | 1.2  | 9        ------------------[122     ( 27 May 2022 14:44 )  3.8  | x    | 18          Lipase:18     Amylase:x         Blood Gas Venous - Lactate: 1.2 mmol/L (05-27-22 @ 14:36)      Coags:     13.6   ----< 1.17    ( 27 May 2022 18:00 )     23.2                      --------------------------------------------------------------------------------------    IMAGING RESULTS:         SICU Consultation Note  =====================================================  HPI:  46 male with h/o Guillain barre, CVA, bedbound and no movement of lower extremities, and Left renal subcapsular hematoma 9/2017, presenting to the ED with right sided pain in the lower abdomen, flank and back x 2 days.  Complains of nausea with emesis.  Reports little to no urge to void over the past 24 hours.  Home attendant at the bedside confirms patient has been oliguric over the past 24 hours.  Denies hematuria or dysuria.  Denies fevers.  Initial presentation, patient is hypotensive with BP in the 90s with a normal heart rate.  Hgb 11.2, HCt 34.8.  CT performed showing a right renal subcapsular hematoma.  Patient became tachycardic, and more hypotensive with SBP in the 70s.  Repeat HGB 10.4 HCT 32.1. Patient requiring blood transfusions, and admission to the SICU.  Patient had a left renal subcapsular hematoma 9/2017, requiring transfusions at that time, and ultimately IR intervention without findings of active bleeding.  Patient recently had a toenail removed, and reports excessive bleeding post procedure requiring Surgicel and pressure dressing.  He denies other episodes of poor clotting, bleeding, or easy bruising. He only takes ASA 81mg, denies other forms of anticoagulation/antiplatelet.    (27 May 2022 20:16)      PAST MEDICAL & SURGICAL HISTORY:  Guillain-[place] syndrome      HTN (hypertension)      HLD (hyperlipidemia)      CVA (cerebral vascular accident)      Renal hematoma  left, 2017, s/p IR angio with no sign of bleed      Tracheostomy, acute management        HOME MEDS:  Home Medications:  aspirin 81 mg oral tablet: 1 tab(s) orally once a day (27 May 2022 20:26)  carvedilol 6.25 mg oral tablet: 1 tab(s) orally 2 times a day (27 May 2022 20:27)  Lipitor 40 mg oral tablet: 1 tab(s) orally once a day (27 May 2022 20:26)  lisinopril 2.5 mg oral tablet: 1 tab(s) orally once a day (27 May 2022 20:27)    ALLERGIES:  Allergies    apple (Vomiting)  penicillin (Hives)    Intolerances      SOCIAL:   ADVANCED DIRECTIVES: Presumed Full Code     REVIEW OF SYSTEMS:  REVIEW OF SYSTEMS    [x] A ten-point review of systems was otherwise negative except as noted.  [ ] Due to altered mental status/intubation, subjective information were not able to be obtained from the patient. History was obtained, to the extent possible, from review of the chart and collateral sources of information.      CURRENT MEDICATIONS:   --------------------------------------------------------------------------------------  Neurologic Medications    Respiratory Medications    Cardiovascular Medications    Gastrointestinal Medications    Genitourinary Medications    Hematologic/Oncologic Medications    Antimicrobial/Immunologic Medications    Endocrine/Metabolic Medications    Topical/Other Medications    --------------------------------------------------------------------------------------    VITAL SIGNS, INS/OUTS (last 24 hours):  --------------------------------------------------------------------------------------  ICU Vital Signs Last 24 Hrs  T(C): 37.1 (27 May 2022 20:24), Max: 37.4 (27 May 2022 19:29)  T(F): 98.8 (27 May 2022 20:24), Max: 99.4 (27 May 2022 19:29)  HR: 104 (27 May 2022 20:24) (94 - 105)  BP: 96/65 (27 May 2022 20:24) (75/55 - 97/64)  BP(mean): 75 (27 May 2022 20:24) (75 - 75)  ABP: --  ABP(mean): --  RR: 17 (27 May 2022 20:24) (16 - 20)  SpO2: 99% (27 May 2022 20:24) (98% - 100%)    I&O's Summary    --------------------------------------------------------------------------------------    EXAM:    General/Neuro  GCS: 15  Exam: Normal, NAD, alert, oriented x 3, voluntary movement of all extremities, limited in strength 4/5 b/l upper extremities and 3/5 b/l lower extremities, sensation intact throughout    Respiratory  Exam: Lungs clear to auscultation, Normal expansion/effort. SpO2 98% on RA    Cardiovascular  Exam: S1, S2.  Regular rate and rhythm.  Peripheral edema, lower extremities cool to touch with good cap refill  Cardiac Rhythm: Normal Sinus Rhythm    GI  Exam: Abdomen firm, diffusely ttp >RLQ, distended.     Tubes/Lines/Drains   [x] Peripheral IV  [] Central Venous Line     	[] R	[] L	[] IJ	[] Fem	[] SC        Type:	    Date Placed:   [] Arterial Line		[] R	[] L	[] Fem	[] Rad	[] Ax	Date Placed:   [] PICC:         	[] Midline		[] Mediport           [] Urinary Catheter		Date Placed:     Extremities  Exam: Extremities warm, pink, well-perfused.      Derm:  Exam: Good skin turgor, no skin breakdown.      :   Exam: No menjivar in place .     --------------------------------------------------------------------------------------  Labs:  CAPILLARY BLOOD GLUCOSE                              10.4   13.49 )-----------( 251      ( 27 May 2022 18:00 )             32.1       Auto Immature Granulocyte %: 0.7 % (05-27-22 @ 18:00)  Auto Neutrophil %: 77.3 % (05-27-22 @ 18:00)  Auto Immature Granulocyte %: 0.6 % (05-27-22 @ 14:44)  Auto Neutrophil %: 80.0 % (05-27-22 @ 14:44)    05-27    130<L>  |  93<L>  |  25<H>  ----------------------------<  123<H>  4.1   |  25  |  2.07<H>      Calcium, Total Serum: 8.5 mg/dL (05-27-22 @ 14:44)      LFTs:             6.9  | 1.2  | 9        ------------------[122     ( 27 May 2022 14:44 )  3.8  | x    | 18          Lipase:18     Amylase:x         Blood Gas Venous - Lactate: 1.2 mmol/L (05-27-22 @ 14:36)      Coags:     13.6   ----< 1.17    ( 27 May 2022 18:00 )     23.2            --------------------------------------------------------------------------------------    IMAGING RESULTS:

## 2022-05-27 NOTE — CONSULT NOTE ADULT - ASSESSMENT
46 male with h/o Guillain barre, paraplegia, CVA, bedbound, HTN, and left renal subcapsular hematoma s/p angio without evidence of active bleed on 9/2017 presenting with 2 days of right sided abdominal pain and back pain. Found to ahve right subcapsular and pararenal space hematoma.     - No acute surgical intervention  - Pt to be admitted to urology   - Rec IR consult for possible angio  - Trend CBC, monitor vitals; transfuse PRN  - SICU consult  - Discussed with Dr. Eliseo Gomes PGY3   B Team Surgery   [id number]

## 2022-05-27 NOTE — CONSULT NOTE ADULT - SUBJECTIVE AND OBJECTIVE BOX
Attending:    Patient is a 46y old  Male who presents with a chief complaint of Hemorrhage, right renal subcapsular hematoma (27 May 2022 20:16)      HPI:  46 male with h/o Guillain barre, CVA, bedbound and no movement of lower extremities, and Left renal subcapsular hematoma 9/2017, presenting to the ED with right sided pain in the lower abdomen, flank and back x 2 days.  Complains of nausea with emesis.  Reports little to no urge to void over the past 24 hours.  Home attendant at the bedside confirms patient has been oliguric over the past 24 hours.  Denies hematuria or dysuria.  Denies fevers.  Initial presentation, patient is hypotensive with BP in the 90s with a normal heart rate.  Hgb 11.2, HCt 34.8.  CT performed showing a right renal subcapsular hematoma.  Patient became tachycardic, and more hypotensive with SBP in the 70s.  Repeat HGB 10.4 HCT 32.1. Patient requiring blood transfusions, and admission to the SICU.  Patient had a left renal subcapsular hematoma 9/2017, requiring transfusions at that time, and ultimately IR intervention without findings of active bleeding.  Patient recently had a toenail removed, and reports excessive bleeding post procedure requiring Surgicel and pressure dressing.  He denies other episodes of poor clotting, bleeding, or easy bruising. He only takes ASA 81mg, denies other forms of anticoagulation/antiplatelet.    (27 May 2022 20:16)      PAST MEDICAL & SURGICAL HISTORY:  Guillain-[place] syndrome      HTN (hypertension)      HLD (hyperlipidemia)      CVA (cerebral vascular accident)      Renal hematoma  left, 2017, s/p IR angio with no sign of bleed      Tracheostomy, acute management          ROS: Negative except for HPI    MEDICATIONS:              Allergies    apple (Vomiting)  penicillin (Hives)    Intolerances        SOCIAL HISTORY: Denies tobacco, social ETOH, denies illicit drug use    FAMILY HISTORY:      Vital Signs Last 24 Hrs  T(C): 37.1 (27 May 2022 20:24), Max: 37.4 (27 May 2022 19:29)  T(F): 98.8 (27 May 2022 20:24), Max: 99.4 (27 May 2022 19:29)  HR: 104 (27 May 2022 20:24) (94 - 105)  BP: 96/65 (27 May 2022 20:24) (75/55 - 97/64)  BP(mean): 75 (27 May 2022 20:24) (75 - 75)  RR: 17 (27 May 2022 20:24) (16 - 20)  SpO2: 99% (27 May 2022 20:24) (98% - 100%)    PHYSICAL EXAM:    Constitutional: NAD well-developed  HEENT: PERRLA, EOMI  Neck: No JVD  Respiratory: CTAB, Cardiovascular: S1 and S2, RRR, no M/G/R  Gastrointestinal: BS+, soft, NT/ND  Extremities: No peripheral edema  Vascular: 2+ peripheral pulses  Neurological: A/O x 3, no focal deficits  Skin: No rashes    LABS:                        10.4   13.49 )-----------( 251      ( 27 May 2022 18:00 )             32.1     05-27    130<L>  |  93<L>  |  25<H>  ----------------------------<  123<H>  4.1   |  25  |  2.07<H>    Ca    8.5      27 May 2022 14:44    TPro  6.9  /  Alb  3.8  /  TBili  1.2  /  DBili  x   /  AST  9   /  ALT  18  /  AlkPhos  122<H>  05-27    PT/INR - ( 27 May 2022 18:00 )   PT: 13.6 sec;   INR: 1.17 ratio         PTT - ( 27 May 2022 18:00 )  PTT:23.2 sec        RADIOLOGY & ADDITIONAL STUDIES:    ASSESSMENT/PLAN:  Patient is a 46y old  Male who presents with a chief complaint of Hemorrhage, right renal subcapsular hematoma (27 May 2022 20:16)      -  -  -  - HPI:   46 male with h/o Guillain barre, paraplegia, CVA, bedbound, HTN presenting with 2 days of right sided abdominal pain and back pain. Pt states pain started suddenly 2 days ago and has progressively worsened. He reports associated n/v and decreae  and Left renal subcapsular hematoma 9/2017, presenting to the ED with right sided pain in the lower abdomen, flank and back x 2 days.  Complains of nausea with emesis.  Reports little to no urge to void over the past 24 hours.  Home attendant at the bedside confirms patient has been oliguric over the past 24 hours.  Denies hematuria or dysuria.  Denies fevers.  Initial presentation, patient is hypotensive with BP in the 90s with a normal heart rate.  Hgb 11.2, HCt 34.8.  CT performed showing a right renal subcapsular hematoma.  Patient became tachycardic, and more hypotensive with SBP in the 70s.  Repeat HGB 10.4 HCT 32.1. Patient requiring blood transfusions, and admission to the SICU.  Patient had a left renal subcapsular hematoma 9/2017, requiring transfusions at that time, and ultimately IR intervention without findings of active bleeding.  Patient recently had a toenail removed, and reports excessive bleeding post procedure requiring Surgicel and pressure dressing.  He denies other episodes of poor clotting, bleeding, or easy bruising. He only takes ASA 81mg, denies other forms of anticoagulation/antiplatelet.    (27 May 2022 20:16)      PAST MEDICAL & SURGICAL HISTORY:  Guillain-[place] syndrome      HTN (hypertension)      HLD (hyperlipidemia)      CVA (cerebral vascular accident)      Renal hematoma  left, 2017, s/p IR angio with no sign of bleed      Tracheostomy, acute management          ROS: Negative except for HPI    MEDICATIONS:              Allergies    apple (Vomiting)  penicillin (Hives)    Intolerances        SOCIAL HISTORY: Denies tobacco, social ETOH, denies illicit drug use    FAMILY HISTORY:      Vital Signs Last 24 Hrs  T(C): 37.1 (27 May 2022 20:24), Max: 37.4 (27 May 2022 19:29)  T(F): 98.8 (27 May 2022 20:24), Max: 99.4 (27 May 2022 19:29)  HR: 104 (27 May 2022 20:24) (94 - 105)  BP: 96/65 (27 May 2022 20:24) (75/55 - 97/64)  BP(mean): 75 (27 May 2022 20:24) (75 - 75)  RR: 17 (27 May 2022 20:24) (16 - 20)  SpO2: 99% (27 May 2022 20:24) (98% - 100%)    PHYSICAL EXAM:    Constitutional: NAD well-developed  HEENT: PERRLA, EOMI  Neck: No JVD  Respiratory: CTAB, Cardiovascular: S1 and S2, RRR, no M/G/R  Gastrointestinal: BS+, soft, NT/ND  Extremities: No peripheral edema  Vascular: 2+ peripheral pulses  Neurological: A/O x 3, no focal deficits  Skin: No rashes    LABS:                        10.4   13.49 )-----------( 251      ( 27 May 2022 18:00 )             32.1     05-27    130<L>  |  93<L>  |  25<H>  ----------------------------<  123<H>  4.1   |  25  |  2.07<H>    Ca    8.5      27 May 2022 14:44    TPro  6.9  /  Alb  3.8  /  TBili  1.2  /  DBili  x   /  AST  9   /  ALT  18  /  AlkPhos  122<H>  05-27    PT/INR - ( 27 May 2022 18:00 )   PT: 13.6 sec;   INR: 1.17 ratio         PTT - ( 27 May 2022 18:00 )  PTT:23.2 sec        RADIOLOGY & ADDITIONAL STUDIES:    ASSESSMENT/PLAN:  Patient is a 46y old  Male who presents with a chief complaint of Hemorrhage, right renal subcapsular hematoma (27 May 2022 20:16)      -  -  -  - HPI:   46 male with h/o Guillain barre, paraplegia, CVA, bedbound, HTN, and left renal subcapsular hematoma s/p angio without evidence of active bleed on 9/2017 presenting with 2 days of right sided abdominal pain and back pain. Pt states pain started suddenly 2 days ago and has progressively worsened. He reports associated n/v and PO intolerance since onset of symptoms. Additionally pt reports low UOP over last 24 and reports flatus or BM today.  Home attendant at the bedside confirms patient has been oliguric over the past 24 hours.  Pt denies fever, chills, dizziness, cp, sob. Pt with LE weakness and minimal motor function at baseline, however new LE weakness/numbness/paresthesias. Not on AC.     In ED pt afebrile, however hypotensive with BP in the 90s and normal HR. Labs with Hgb 11.2 down to 10.4 while in ED. CT performed showing a right subcapsular hematoma measuring 8.4 x 7.8 x 15.6cm, and right anterior pararenal space hematoma measuring 10.9 x 5.6 x 11.1 hemorrhage tracking into the right lower   abdomen, pelvis, and a right inguinal hernia.       PAST MEDICAL & SURGICAL HISTORY:    Guillain-[place] syndrome  HTN (hypertension)  HLD (hyperlipidemia)  CVA (cerebral vascular accident)  Renal hematoma  left, 2017, s/p IR angio with no sign of bleed  Tracheostomy, acute management    ROS: Negative except for HPI    Allergies  apple (Vomiting)  penicillin (Hives)  Intolerances    Vital Signs Last 24 Hrs  T(C): 37.1 (27 May 2022 20:24), Max: 37.4 (27 May 2022 19:29)  T(F): 98.8 (27 May 2022 20:24), Max: 99.4 (27 May 2022 19:29)  HR: 104 (27 May 2022 20:24) (94 - 105)  BP: 96/65 (27 May 2022 20:24) (75/55 - 97/64)  BP(mean): 75 (27 May 2022 20:24) (75 - 75)  RR: 17 (27 May 2022 20:24) (16 - 20)  SpO2: 99% (27 May 2022 20:24) (98% - 100%)    PHYSICAL EXAM:  Constitutional: NAD  HEENT: PERRLA, EOMI  Neck: Supple   Respiratory: Unlabored    Cardiovascular: Tachycardic, regular rhythm   Gastrointestinal: softly distended, right sided TTP. No rebound or guarding.    Extremities: No peripheral edema. Motor limited by baseline paraplegia and back pain.   Vascular: 2+ peripheral pulses  Neurological: A/O x 3, no focal deficits      LABS:                        10.4   13.49 )-----------( 251      ( 27 May 2022 18:00 )             32.1     05-27    130<L>  |  93<L>  |  25<H>  ----------------------------<  123<H>  4.1   |  25  |  2.07<H>    Ca    8.5      27 May 2022 14:44    TPro  6.9  /  Alb  3.8  /  TBili  1.2  /  DBili  x   /  AST  9   /  ALT  18  /  AlkPhos  122<H>  05-27    PT/INR - ( 27 May 2022 18:00 )   PT: 13.6 sec;   INR: 1.17 ratio         PTT - ( 27 May 2022 18:00 )  PTT:23.2 sec        RADIOLOGY & ADDITIONAL STUDIES:    ACC: [id number] EXAM:  CT ABDOMEN AND PELVIS IC                          PROCEDURE DATE:  05/27/2022          INTERPRETATION:  CLINICAL INFORMATION: Abdominal pain.    COMPARISON: CT abdomen/pelvis 7/24/2018 and abdominal radiograph 5/27/2022    CONTRAST/COMPLICATIONS:  IV Contrast: Omnipaque 350  60 cc administered   10 cc discarded  Oral Contrast: NONE  Complications: None reported at time of study completion    PROCEDURE:  CT of the Abdomen and Pelvis was performed.  Sagittal and coronal reformats were performed.    FINDINGS:  LOWER CHEST: Bibasilar subsegmental atelectasis. Bilateral symmetric   gynecomastia.    LIVER: Within normal limits.  BILE DUCTS: Normal caliber.  GALLBLADDER: Within normal limits.  SPLEEN: Within normal limits.  PANCREAS: Within normal limits.  ADRENALS: Within normal limits.  KIDNEYS/URETERS: Right subcapsular hematoma measuring 8.4 x 7.8 x 15.6   cm. Increased size of indeterminate 1.8 cm left interpolar   hyperattenuating focus, previously 1.3 cm and demonstrating contrast   enhancement on prior CT. No hydronephrosis. Bilateral nonobstructing   renal calculi measuring 0.5 cm on the right and 0.4 cm on the left.    BLADDER: Within normal limits.  REPRODUCTIVE ORGANS: Prostate within normal limits.    BOWEL: No bowel obstruction. Appendix is normal.  PERITONEUM: No ascites.  VESSELS: Within normal limits.  RETROPERITONEUM/LYMPH NODES: Right anterior pararenal space hematoma   measuring 10.9 x 5.6 x 11.1 hemorrhage tracking into the right lower   abdomen, pelvis, and a right inguinal hernia. No lymphadenopathy.  ABDOMINAL WALL: Within normal limits.  BONES: Degenerative changes.    IMPRESSION:  Large right subcapsular renal hematoma. Correlate clinically for Page   kidney.    Large right anterior pararenal space hematoma with retroperitoneal   hemorrhage tracking into the right lower abdomen, pelvis, and a right   inguinal hernia.    Increased size of indeterminate 1.8 cm left interpolar renal lesion,   previously demonstrating enhancement and measuring 1.3 cm, suspicious for   neoplasm. Consider further evaluation with contrast-enhanced MRI abdomen.    These findings were discussed with Dr. Glass at 5/27/2022 5:39 PM by Dr. Gordon with read back.

## 2022-05-27 NOTE — CONSULT NOTE ADULT - ASSESSMENT
ASSESSMENT:  46y Male pmh CVA, R Subcapsular hematoma, Guillan barre (non-ambulatory) presenting initially to ED for 2 days of worsening RLQ pain with radiation to R back a/w progressive abd distention. Has never had similar pain before, sharp, constant and progressive. Severe, limiting PO intake. No fevers/chills, n/v, change in bowel or urinary habits. During stay in ED pt underwent CT abd/pelvis which demonstrated Large right anterior pararenal space hematoma with retroperitoneal   hemorrhage tracking into the right lower abdomen . Had drop in H/H from 11.2/34.8 -> 10.4/32.1. Transfused one unit pRBCs.  Never hypotensive with maps in 70s however tachy to low 100s with systolics  75-96. Currently mentating well, appears well perfused.     PLAN:   Neurologic:   - hx CVA, Cerebral palsy, non-ambulatory at baseline  - no acute issues, AAOx3 mentating well    Respiratory:   - Sat 98% on RA,    Cardiovascular:   Gastrointestinal/Nutrition:   Renal/Genitourinary:   Hematologic:   Infectious Disease:   Lines/Tubes:  Endocrine:   Disposition:  ASSESSMENT:  46y Male pmh CVA, R Subcapsular hematoma, Guillan barre (non-ambulatory) presenting initially to ED for 2 days of worsening RLQ pain with radiation to R back a/w progressive abd distention. Has never had similar pain before, sharp, constant and progressive. Severe, limiting PO intake. No fevers/chills, n/v, change in bowel or urinary habits. During stay in ED pt underwent CT abd/pelvis which demonstrated Large right anterior pararenal space hematoma with retroperitoneal   hemorrhage tracking into the right lower abdomen . Had drop in H/H from 11.2/34.8 -> 10.4/32.1. Transfused one unit pRBCs.  Never hypotensive with maps in 70s however tachy to low 100s with systolics  75-96. Currently mentating well, appears well perfused.     PLAN:   Neurologic:   - hx CVA, Cerebral palsy, non-ambulatory at baseline  - no acute issues, AAOx3 mentating well    Respiratory:   - Sat 98% on RA,    Cardiovascular:   - Hemorrhagic shock     Gastrointestinal/Nutrition:   - NPO     Renal/Genitourinary:   - Difficult Park in ED  -     Hematologic:   - Likely active       Infectious Disease:   - Afebrile  - Monitor off antibiotics    Lines/Tubes:  Endocrine:   Disposition:  ASSESSMENT:  46y Male pmh CVA, R Subcapsular hematoma, Guillan barre (non-ambulatory) presenting initially to ED for 2 days of worsening RLQ pain with radiation to R back a/w progressive abd distention. Has never had similar pain before, sharp, constant and progressive. Severe, limiting PO intake. No fevers/chills, n/v, change in bowel or urinary habits. During stay in ED pt underwent CT abd/pelvis which demonstrated large right anterior pararenal space hematoma with retroperitoneal hemorrhage tracking into the right lower abdomen . Had drop in H/H from 11.2/34.8 -> 10.4/32.1. Transfused one unit pRBCs.  Never hypotensive with maps in 70s however tachy to low 100s with systolics  75-96. Currently mentating well, appears well perfused.     PLAN:   Neurologic:   - hx CVA, Cerebral palsy, non-ambulatory at baseline  - no acute issues, AAOx3 mentating well  - Pain meds: IV Tylenol PRN, Dilaudid 0.5 PRN    Respiratory:   - Sat 98% on RA    Cardiovascular:   - Hemorrhagic shock     Gastrointestinal/Nutrition:   - NPO   - Protonix for GI ppx    Renal/Genitourinary:   - Subcapsular R renal hematoma with RP bleed  - IR consulted -> Recommending CTA if pt becomes unstable, possible embolization  - Urology on back up with possible R nephrectomy if IR embolization not successful  - Difficult [place] in ED  - Trend I's & O's    Hematologic:   - s/p 4u pRBC, 1u FFP, 1u platelets  - CBC q6, trend h/h downtrending  - Uro requesting TEG, not available over weekend    Infectious Disease:   - Afebrile  - Monitor off antibiotics    Lines/Tubes:  - PIV x3, L Radial A-line    Endocrine:   - ANA   - Trend blood glucose     Disposition: SICU

## 2022-05-27 NOTE — ED PROVIDER NOTE - PHYSICAL EXAMINATION
Jaycee Alba MD  GENERAL: Patient awake alert NAD.  HEENT: NC/AT, Moist mucous membranes, EOMI.  LUNGS: CTAB, no wheezes or crackles.   CARDIAC: RRR, no m/r/g.    ABDOMEN: +tender RLQ and LLQ, +mild distension, No rebound, +guarding. No CVA tenderness.   EXT: No edema. No calf tenderness.   MSK: no pain with movement, no deformities.  NEURO: A&Ox3. Moving all extremities.  SKIN: Warm and dry. No rash.  PSYCH: Normal affect.

## 2022-05-27 NOTE — H&P ADULT - NSICDXPASTMEDICALHX_GEN_ALL_CORE_FT
PAST MEDICAL HISTORY:  CVA (cerebral vascular accident)     Guillain-Saline syndrome     HLD (hyperlipidemia)     HTN (hypertension)     Renal hematoma left, 2017, s/p IR angio with no sign of bleed

## 2022-05-27 NOTE — ED ADULT NURSE REASSESSMENT NOTE - NS ED NURSE REASSESS COMMENT FT1
pt educated on possible side effects of blood transfusion. blood infusing at this time. will continue to monitor.

## 2022-05-27 NOTE — H&P ADULT - HISTORY OF PRESENT ILLNESS
46 male with h/o Guillain barre, CVA, bedbound and no movement of lower extremities, and Left renal subcapsular hematoma 9/2017, presenting to the ED with right sided pain in the lower abdomen, flank and back x 2 days.  Complains of nausea with emesis.  Reports little to no urge to void over the past 24 hours.  Home attendant at the bedside confirms patient has been oliguric over the past 24 hours.  Denies hematuria or dysuria.  Denies fevers.  Initial presentation, patient is hypotensive with BP in the 90s with a normal heart rate.  Hgb 11.2, HCt 34.8.  CT performed showing a right renal subcapsular hematoma.  Patient became tachycardic, and more hypotensive with SBP in the 70s.  Repeat HGB 10.4 HCT 32.1. Patient requiring blood transfusions, and admission to the SICU.  Patient had a left renal subcapsular hematoma 9/2017, requiring transfusions at that time, and ultimately IR intervention without findings of active bleeding.  Patient recently had a toenail removed, and reports excessive bleeding post procedure requiring Surgicel and pressure dressing.  He denies other episodes of poor clotting, bleeding, or easy bruising. He only takes ASA 81mg, denies other forms of anticoagulation/antiplatelet.

## 2022-05-27 NOTE — ED PROVIDER NOTE - PROGRESS NOTE DETAILS
CT shows Large right subcapsular renal hematoma. Correlate clinically for Page kidney.    Large right anterior pararenal space hematoma with retroperitoneal hemorrhage tracking into the right lower abdomen, pelvis, and a right inguinal hernia.    Increased size of indeterminate 1.8 cm left interpolar renal lesion, previously demonstrating enhancement and measuring 1.3 cm, suspicious for neoplasm. Consider further evaluation with contrast-enhanced MRI abdomen    Surgery and Uro will see pt, both consulted Rosalio, PGY2: Received care upon signout; patient is a 46 year-old male with a history of CVA, Guillian Anderson now bedbound who presents with abdominal pain found to have large right subcapsular renal hematoma and a large right anterior pararenal space hematoma with retroperitoneal hemorrhage tracking into the right lower abdomen, pelvis, and a right inguinal hernia. BP in the 90s; MAPS >70 and mentating well. Surgery currently at bedside and a unit of prbc has ordered Glass: Pt's BP low stable, HR increasing, Hg also dropped 1 point. Stat blood ordered.  at bedside, IR called for possible embolization. IR currently in another emergent case, they will see the pt when they can Maria Luisa: Discussed with IR fellow Dr. Price, recommend continued TF. Believe that retroperitoneal hematoma will likely stop once blood products are given. Will give 2 units PRBCs and continue to monitor closely. Pt requires higher level of care. Discussed with  and surgery teams. Rosalio, PGY2: Patient TBA to SICU Under surgery Discussed with heme consult Dr. Tijerina: recommend 2:1:1 PRBC:plt:FFP for transfusion. will do full consult in AM.

## 2022-05-27 NOTE — ED ADULT NURSE NOTE - INTERVENTIONS DEFINITIONS
[place]arned to call system/Call bell, personal items and telephone within reach/Instruct patient to call for assistance/Room bathroom lighting operational/Non-slip footwear when patient is off stretcher/Physically safe environment: no spills, clutter or unnecessary equipment/Stretcher in lowest position, wheels locked, appropriate side rails in place/Monitor for mental status changes and reorient to person, place, and time/Reinforce activity limits and safety measures with patient and family

## 2022-05-28 LAB
ANION GAP SERPL CALC-SCNC: 13 MMOL/L — SIGNIFICANT CHANGE UP (ref 7–14)
ANION GAP SERPL CALC-SCNC: 13 MMOL/L — SIGNIFICANT CHANGE UP (ref 7–14)
APPEARANCE UR: ABNORMAL
APTT BLD: 28.2 SEC — SIGNIFICANT CHANGE UP (ref 27–36.3)
BACTERIA # UR AUTO: ABNORMAL
BILIRUB UR-MCNC: NEGATIVE — SIGNIFICANT CHANGE UP
BLOOD GAS ARTERIAL COMPREHENSIVE RESULT: SIGNIFICANT CHANGE UP
BUN SERPL-MCNC: 28 MG/DL — HIGH (ref 7–23)
BUN SERPL-MCNC: 33 MG/DL — HIGH (ref 7–23)
CALCIUM SERPL-MCNC: 8 MG/DL — LOW (ref 8.4–10.5)
CALCIUM SERPL-MCNC: 8.1 MG/DL — LOW (ref 8.4–10.5)
CHLORIDE SERPL-SCNC: 95 MMOL/L — LOW (ref 98–107)
CHLORIDE SERPL-SCNC: 95 MMOL/L — LOW (ref 98–107)
CO2 SERPL-SCNC: 22 MMOL/L — SIGNIFICANT CHANGE UP (ref 22–31)
CO2 SERPL-SCNC: 23 MMOL/L — SIGNIFICANT CHANGE UP (ref 22–31)
COLOR SPEC: YELLOW — SIGNIFICANT CHANGE UP
COMMENT - URINE: SIGNIFICANT CHANGE UP
CREAT SERPL-MCNC: 2.14 MG/DL — HIGH (ref 0.5–1.3)
CREAT SERPL-MCNC: 2.51 MG/DL — HIGH (ref 0.5–1.3)
DIFF PNL FLD: ABNORMAL
EGFR: 31 ML/MIN/1.73M2 — LOW
EGFR: 38 ML/MIN/1.73M2 — LOW
EPI CELLS # UR: SIGNIFICANT CHANGE UP /HPF (ref 0–5)
GLUCOSE SERPL-MCNC: 110 MG/DL — HIGH (ref 70–99)
GLUCOSE SERPL-MCNC: 92 MG/DL — SIGNIFICANT CHANGE UP (ref 70–99)
GLUCOSE UR QL: NEGATIVE — SIGNIFICANT CHANGE UP
HCT VFR BLD CALC: 32.9 % — LOW (ref 39–50)
HCT VFR BLD CALC: 39.4 % — SIGNIFICANT CHANGE UP (ref 39–50)
HGB BLD-MCNC: 11.3 G/DL — LOW (ref 13–17)
HGB BLD-MCNC: 12.9 G/DL — LOW (ref 13–17)
INR BLD: 1.14 RATIO — SIGNIFICANT CHANGE UP (ref 0.88–1.16)
KETONES UR-MCNC: NEGATIVE — SIGNIFICANT CHANGE UP
LEUKOCYTE ESTERASE UR-ACNC: ABNORMAL
MAGNESIUM SERPL-MCNC: 1.6 MG/DL — SIGNIFICANT CHANGE UP (ref 1.6–2.6)
MAGNESIUM SERPL-MCNC: 1.9 MG/DL — SIGNIFICANT CHANGE UP (ref 1.6–2.6)
MCHC RBC-ENTMCNC: 28.5 PG — SIGNIFICANT CHANGE UP (ref 27–34)
MCHC RBC-ENTMCNC: 29.1 PG — SIGNIFICANT CHANGE UP (ref 27–34)
MCHC RBC-ENTMCNC: 32.7 GM/DL — SIGNIFICANT CHANGE UP (ref 32–36)
MCHC RBC-ENTMCNC: 34.3 GM/DL — SIGNIFICANT CHANGE UP (ref 32–36)
MCV RBC AUTO: 83.1 FL — SIGNIFICANT CHANGE UP (ref 80–100)
MCV RBC AUTO: 88.7 FL — SIGNIFICANT CHANGE UP (ref 80–100)
NITRITE UR-MCNC: NEGATIVE — SIGNIFICANT CHANGE UP
NRBC # BLD: 0 /100 WBCS — SIGNIFICANT CHANGE UP
NRBC # BLD: 0 /100 WBCS — SIGNIFICANT CHANGE UP
NRBC # FLD: 0 K/UL — SIGNIFICANT CHANGE UP
NRBC # FLD: 0 K/UL — SIGNIFICANT CHANGE UP
PH UR: 6 — SIGNIFICANT CHANGE UP (ref 5–8)
PHOSPHATE SERPL-MCNC: 4.1 MG/DL — SIGNIFICANT CHANGE UP (ref 2.5–4.5)
PHOSPHATE SERPL-MCNC: 4.2 MG/DL — SIGNIFICANT CHANGE UP (ref 2.5–4.5)
PLATELET # BLD AUTO: 191 K/UL — SIGNIFICANT CHANGE UP (ref 150–400)
PLATELET # BLD AUTO: 203 K/UL — SIGNIFICANT CHANGE UP (ref 150–400)
POTASSIUM SERPL-MCNC: 4.2 MMOL/L — SIGNIFICANT CHANGE UP (ref 3.5–5.3)
POTASSIUM SERPL-MCNC: 4.3 MMOL/L — SIGNIFICANT CHANGE UP (ref 3.5–5.3)
POTASSIUM SERPL-SCNC: 4.2 MMOL/L — SIGNIFICANT CHANGE UP (ref 3.5–5.3)
POTASSIUM SERPL-SCNC: 4.3 MMOL/L — SIGNIFICANT CHANGE UP (ref 3.5–5.3)
PROT UR-MCNC: ABNORMAL
PROTHROM AB SERPL-ACNC: 13.3 SEC — SIGNIFICANT CHANGE UP (ref 10.5–13.4)
RBC # BLD: 3.96 M/UL — LOW (ref 4.2–5.8)
RBC # BLD: 4.44 M/UL — SIGNIFICANT CHANGE UP (ref 4.2–5.8)
RBC # FLD: 14.1 % — SIGNIFICANT CHANGE UP (ref 10.3–14.5)
RBC # FLD: 14.6 % — HIGH (ref 10.3–14.5)
RBC CASTS # UR COMP ASSIST: SIGNIFICANT CHANGE UP /HPF (ref 0–4)
SODIUM SERPL-SCNC: 130 MMOL/L — LOW (ref 135–145)
SODIUM SERPL-SCNC: 131 MMOL/L — LOW (ref 135–145)
SP GR SPEC: >1.05 (ref 1–1.05)
UROBILINOGEN FLD QL: SIGNIFICANT CHANGE UP
WBC # BLD: 11.55 K/UL — HIGH (ref 3.8–10.5)
WBC # BLD: 14.64 K/UL — HIGH (ref 3.8–10.5)
WBC # FLD AUTO: 11.55 K/UL — HIGH (ref 3.8–10.5)
WBC # FLD AUTO: 14.64 K/UL — HIGH (ref 3.8–10.5)
WBC UR QL: SIGNIFICANT CHANGE UP /HPF (ref 0–5)

## 2022-05-28 PROCEDURE — 99222 1ST HOSP IP/OBS MODERATE 55: CPT | Mod: GC

## 2022-05-28 PROCEDURE — 99221 1ST HOSP IP/OBS SF/LOW 40: CPT

## 2022-05-28 PROCEDURE — 99291 CRITICAL CARE FIRST HOUR: CPT

## 2022-05-28 RX ORDER — ATORVASTATIN CALCIUM 80 MG/1
40 TABLET, FILM COATED ORAL AT BEDTIME
Refills: 0 | Status: DISCONTINUED | OUTPATIENT
Start: 2022-05-28 | End: 2022-06-03

## 2022-05-28 RX ORDER — MAGNESIUM SULFATE 500 MG/ML
2 VIAL (ML) INJECTION ONCE
Refills: 0 | Status: COMPLETED | OUTPATIENT
Start: 2022-05-28 | End: 2022-05-28

## 2022-05-28 RX ORDER — PANTOPRAZOLE SODIUM 20 MG/1
40 TABLET, DELAYED RELEASE ORAL DAILY
Refills: 0 | Status: DISCONTINUED | OUTPATIENT
Start: 2022-05-28 | End: 2022-05-28

## 2022-05-28 RX ORDER — HYDROMORPHONE HYDROCHLORIDE 2 MG/ML
0.5 INJECTION INTRAMUSCULAR; INTRAVENOUS; SUBCUTANEOUS EVERY 4 HOURS
Refills: 0 | Status: DISCONTINUED | OUTPATIENT
Start: 2022-05-28 | End: 2022-05-28

## 2022-05-28 RX ORDER — HYDROMORPHONE HYDROCHLORIDE 2 MG/ML
0.5 INJECTION INTRAMUSCULAR; INTRAVENOUS; SUBCUTANEOUS ONCE
Refills: 0 | Status: DISCONTINUED | OUTPATIENT
Start: 2022-05-28 | End: 2022-05-28

## 2022-05-28 RX ORDER — ACETAMINOPHEN 500 MG
1000 TABLET ORAL EVERY 6 HOURS
Refills: 0 | Status: COMPLETED | OUTPATIENT
Start: 2022-05-28 | End: 2022-05-29

## 2022-05-28 RX ORDER — OXYCODONE HYDROCHLORIDE 5 MG/1
10 TABLET ORAL EVERY 4 HOURS
Refills: 0 | Status: DISCONTINUED | OUTPATIENT
Start: 2022-05-28 | End: 2022-06-03

## 2022-05-28 RX ORDER — PANTOPRAZOLE SODIUM 20 MG/1
40 TABLET, DELAYED RELEASE ORAL
Refills: 0 | Status: DISCONTINUED | OUTPATIENT
Start: 2022-05-28 | End: 2022-06-03

## 2022-05-28 RX ORDER — OXYCODONE HYDROCHLORIDE 5 MG/1
5 TABLET ORAL EVERY 4 HOURS
Refills: 0 | Status: DISCONTINUED | OUTPATIENT
Start: 2022-05-28 | End: 2022-06-03

## 2022-05-28 RX ORDER — HYDROMORPHONE HYDROCHLORIDE 2 MG/ML
0.25 INJECTION INTRAMUSCULAR; INTRAVENOUS; SUBCUTANEOUS EVERY 4 HOURS
Refills: 0 | Status: DISCONTINUED | OUTPATIENT
Start: 2022-05-28 | End: 2022-05-28

## 2022-05-28 RX ADMIN — Medication 25 GRAM(S): at 09:59

## 2022-05-28 RX ADMIN — OXYCODONE HYDROCHLORIDE 10 MILLIGRAM(S): 5 TABLET ORAL at 17:58

## 2022-05-28 RX ADMIN — HYDROMORPHONE HYDROCHLORIDE 0.5 MILLIGRAM(S): 2 INJECTION INTRAMUSCULAR; INTRAVENOUS; SUBCUTANEOUS at 01:17

## 2022-05-28 RX ADMIN — Medication 1000 MILLIGRAM(S): at 13:04

## 2022-05-28 RX ADMIN — HYDROMORPHONE HYDROCHLORIDE 0.5 MILLIGRAM(S): 2 INJECTION INTRAMUSCULAR; INTRAVENOUS; SUBCUTANEOUS at 01:45

## 2022-05-28 RX ADMIN — SODIUM CHLORIDE 100 MILLILITER(S): 9 INJECTION, SOLUTION INTRAVENOUS at 21:20

## 2022-05-28 RX ADMIN — ATORVASTATIN CALCIUM 40 MILLIGRAM(S): 80 TABLET, FILM COATED ORAL at 21:19

## 2022-05-28 RX ADMIN — Medication 400 MILLIGRAM(S): at 18:05

## 2022-05-28 RX ADMIN — PANTOPRAZOLE SODIUM 40 MILLIGRAM(S): 20 TABLET, DELAYED RELEASE ORAL at 11:57

## 2022-05-28 RX ADMIN — SODIUM CHLORIDE 100 MILLILITER(S): 9 INJECTION, SOLUTION INTRAVENOUS at 10:03

## 2022-05-28 RX ADMIN — OXYCODONE HYDROCHLORIDE 10 MILLIGRAM(S): 5 TABLET ORAL at 16:12

## 2022-05-28 RX ADMIN — Medication 400 MILLIGRAM(S): at 11:58

## 2022-05-28 RX ADMIN — SODIUM CHLORIDE 100 MILLILITER(S): 9 INJECTION, SOLUTION INTRAVENOUS at 04:37

## 2022-05-28 RX ADMIN — Medication 400 MILLIGRAM(S): at 06:13

## 2022-05-28 RX ADMIN — Medication 1000 MILLIGRAM(S): at 06:22

## 2022-05-28 NOTE — PROCEDURE NOTE - ADDITIONAL PROCEDURE DETAILS
Attempted menjivar catheter unsuccessful by ED RN and SICU RN secondary to resistance.    16F coude placed without difficulty, return of 75cc clear yellow urine.    -Urology, pager [phone]

## 2022-05-28 NOTE — PROGRESS NOTE ADULT - SUBJECTIVE AND OBJECTIVE BOX
SICU AM PROGRESS NOTE  ===============================  HPI  46 male with h/o Guillain barre, CVA, bedbound and no movement of lower extremities, and Left renal subcapsular hematoma 9/2017, presenting to the ED with right sided pain in the lower abdomen, flank and back x 2 days.  Complains of nausea with emesis.  Reports little to no urge to void over the past 24 hours.  Home attendant at the bedside confirms patient has been oliguric over the past 24 hours.  Denies hematuria or dysuria.  Denies fevers.  Initial presentation, patient is hypotensive with BP in the 90s with a normal heart rate.  Hgb 11.2, HCt 34.8.  CT performed showing a right renal subcapsular hematoma.  Patient became tachycardic, and more hypotensive with SBP in the 70s.  Repeat HGB 10.4 HCT 32.1. Patient requiring blood transfusions, and admission to the SICU.  Patient had a left renal subcapsular hematoma 9/2017, requiring transfusions at that time, and ultimately IR intervention without findings of active bleeding.  Patient recently had a toenail removed, and reports excessive bleeding post procedure requiring Surgicel and pressure dressing.  He denies other episodes of poor clotting, bleeding, or easy bruising. He only takes ASA 81mg, denies other forms of anticoagulation/antiplatelet.     Interval Events:   -Received 2uPRBC in ED  - minimal response from b      VITAL SIGNS, INS/OUTS (last 24 hours):  --------------------------------------------------------------------------------------  T(C): 36.7 (05-28-22 @ 04:00), Max: 37.4 (05-27-22 @ 19:29)  HR: 97 (05-28-22 @ 04:00) (91 - 105)  BP: 96/46 (05-27-22 @ 21:20) (75/55 - 97/64)  BP(mean): 60 (05-27-22 @ 21:20) (60 - 75)  ABP: 97/65 (05-28-22 @ 04:00) (96/54 - 117/67)  ABP(mean): 75 (05-28-22 @ 04:00) (74 - 83)  RR: 16 (05-28-22 @ 04:00) (16 - 21)  SpO2: 100% (05-28-22 @ 04:00) (98% - 100%)  CAPILLARY BLOOD GLUCOSE    --------------------------------------------------------------------------------------    EXAM  NEUROLOGY  Exam: Normal, NAD, alert, oriented x3, no focal deficits. PERRLA.       RESPIRATORY  Exam: Lungs clear to auscultation, Normal expansion/effort.    CARDIOVASCULAR  Exam: S1, S2.  Regular rate and rhythm     GI/NUTRITION  Exam: Abdomen soft, Non-tender, moderately distended  Current Diet:  NPO    METABOLIC/FLUIDS/ELECTROLYTES  lactated ringers. 1000 milliLiter(s) IV Continuous <Continuous>      HEMATOLOGIC  [x] DVT Prophylaxis:   Transfusions:	[] PRBC	[] Platelets		[] FFP	[] Cryoprecipitate    INFECTIOUS DISEASE  Antimicrobials/Immunologic Medications:      VASCULAR  Exam: Extremities warm, pink, well-perfused.      MUSCULOSKELETAL  Exam: All extremities moving spontaneously without limitations.      SKIN  Exam: Good skin turgor, no skin breakdown.     LABS  --------------------------------------------------------------------------------------  CBC (05-28 @ 01:20)                              12.9<L>                         14.64<H>  )----------------(  203        --    % Neutrophils, --    % Lymphocytes, ANC: --                                  39.4    CBC (05-27 @ 18:00)                              10.4<L>                         13.49<H>  )----------------(  251        77.3<H>% Neutrophils, 12.7<L>% Lymphocytes, ANC: 10.43<H>                              32.1<L>    BMP (05-28 @ 01:20)             130<L>  |  95<L>   |  28<H> 		Ca++ --      Ca 8.0<L>             ---------------------------------( 110<H>		Mg 1.60               4.3     |  22      |  2.14<H>			Ph 4.1     BMP (05-27 @ 14:44)             130<L>  |  93<L>   |  25<H> 		Ca++ --      Ca 8.5                ---------------------------------( 123<H>		Mg --                 4.1     |  25      |  2.07<H>			Ph --        LFTs (05-27 @ 14:44)      TPro 6.9 / Alb 3.8 / TBili 1.2 / DBili -- / AST 9 / ALT 18 / AlkPhos 122<H>    Coags (05-28 @ 01:20)  aPTT 28.2 / INR 1.14 / PT 13.3  Coags (05-27 @ 18:00)  aPTT 23.2<L> / INR 1.17<H> / PT 13.6<H>      ABG (05-28 @ 01:20)     7.39 / 41 / 93 / 25 / -0.2 / 98.6<H>%     Lactate:     ABG (05-27 @ 14:36)      /  /  /  /  / %     Lactate:   1.2    VBG (05-27 @ 14:36)     7.37 / 47 / 58 / 27 / 1.4 / 90.0%        --------------------------------------------------------------------------------------    IMAGING STUDIES

## 2022-05-28 NOTE — PROGRESS NOTE ADULT - ASSESSMENT
46 male with h/o Guillain barre, paraplegia, CVA, bedbound, HTN, and left renal subcapsular hematoma s/p angio without evidence of active bleed on 9/2017 presenting with 2 days of right sided abdominal pain and back pain. Found to ahve right subcapsular and pararenal space hematoma.     - No acute surgical intervention  - Rec IR consult for possible angio  - Trend CBC, monitor vitals; transfuse PRN  - Appreciate SICU care     B Team Surgery   [id number]

## 2022-05-28 NOTE — PROGRESS NOTE ADULT - ASSESSMENT
45 yo M h/o Guillain barre, CVA, bedbound and no movement of lower extremities, and Left renal subcapsular hematoma 9/2017, presented to the ED 5/27/2022 with right sided pain in the lower abdomen, flank and back x 2 days, N/V.  Reports little to no urge to void over the past 24 hours.  Home attendant at the bedside confirms patient has been oliguric over the past 24 hours.  Denies hematuria or dysuria.  Denies fevers. Patient was hypotensive with BP in the 90s with a normal heart rate.  Hgb 11.2, HCt 34.8.  CT performed showing a right renal subcapsular hematoma.  Patient became tachycardic, and more hypotensive with SBP in the 70s.  Repeat HGB 10.4 HCT 32.1. Patient transfused and admitted to SICU.  Patient had a left renal subcapsular hematoma 9/2017, requiring transfusions at that time, and ultimately IR intervention without findings of active bleeding.  Patient recently had a toenail removed, and reports excessive bleeding post procedure requiring Surgicel and pressure dressing.  He denies other episodes of poor clotting, bleeding, or easy bruising. He only takes ASA 81mg, denies other forms of anticoagulation/antiplatelet.     Plan:  -monitor UO  -monitor HCT  -monitor abdominal exam  -heme consult  -pain control  -continue menjivar  -reg diet  -appreciate SICU care

## 2022-05-28 NOTE — PROGRESS NOTE ADULT - SUBJECTIVE AND OBJECTIVE BOX
Note written now but patient seen at 7AM this AM    Subjective:  Pt states he feels much better this AM    Objective:    Vital signs  T(C): , Max: 37.4 (05-27-22 @ 19:29)  HR: 95 (05-28-22 @ 15:00)  BP: 96/46 (05-27-22 @ 21:20)  SpO2: 99% (05-28-22 @ 15:00)  Wt(kg): --    Output   Otto: 90   05-27 @ 07:01  -  05-28 @ 07:00  --------------------------------------------------------  IN: 400 mL / OUT: 90 mL / NET: 310 mL    05-28 @ 07:01  -  05-28 @ 15:53  --------------------------------------------------------  IN: 950 mL / OUT: 170 mL / NET: 780 mL        Gen: NAD  Abd: abdomen distended, right sided/flank tenderness, no flank ecchymoses appreciated  : otto secured    Labs                        11.3   11.55 )-----------( 191      ( 28 May 2022 11:29 )             32.9     28 May 2022 11:29    131    |  95     |  33     ----------------------------<  92     4.2     |  23     |  2.51     Ca    8.1        28 May 2022 11:29  Phos  4.2       28 May 2022 11:29  Mg     1.90      28 May 2022 11:29        Imaging

## 2022-05-28 NOTE — PROGRESS NOTE ADULT - SUBJECTIVE AND OBJECTIVE BOX
SURGERY DAILY PROGRESS NOTE:     Interval Events:   -Received 2uPRBC in ED  - minimal response   - Overall received 4u pRBC, 1u plt, 1u plasma,    SUBJECTIVE/ROS: No acute overnight events. Patient seen and examined at bedside during morning rounds.    OBJECTIVE:    Vital Signs Last 24 Hrs  T(C): 36.2 (28 May 2022 08:00), Max: 37.4 (27 May 2022 19:29)  T(F): 97.1 (28 May 2022 08:00), Max: 99.4 (27 May 2022 19:29)  HR: 93 (28 May 2022 10:00) (88 - 105)  BP: 96/46 (27 May 2022 21:20) (75/55 - 97/64)  BP(mean): 60 (27 May 2022 21:20) (60 - 75)  RR: 12 (28 May 2022 10:00) (11 - 27)  SpO2: 100% (28 May 2022 10:00) (98% - 100%)    I&O's Detail    27 May 2022 07:01  -  28 May 2022 07:00  --------------------------------------------------------  IN:    Lactated Ringers: 400 mL  Total IN: 400 mL    OUT:    Indwelling Catheter - Urethral (mL): 40 mL    Voided (mL): 50 mL  Total OUT: 90 mL    Total NET: 310 mL      28 May 2022 07:01  -  28 May 2022 10:13  --------------------------------------------------------  IN:    IV PiggyBack: 50 mL    Lactated Ringers: 300 mL    Plasma: 300 mL  Total IN: 650 mL    OUT:    Indwelling Catheter - Urethral (mL): 120 mL  Total OUT: 120 mL    Total NET: 530 mL          PHYSICAL EXAM:  Constitutional: NAD  HEENT: PERRLA, EOMI  Neck: Supple   Respiratory: Unlabored    Cardiovascular: Tachycardic, regular rhythm   Gastrointestinal: moderately distended, right sided TTP. No rebound or guarding.    Extremities: No peripheral edema. Motor limited by baseline paraplegia and back pain.   Vascular: 2+ peripheral pulses  Neurological: A/O x 3, no focal deficits    LABS:                        12.9   14.64 )-----------( 203      ( 28 May 2022 01:20 )             39.4     05-28    130<L>  |  95<L>  |  28<H>  ----------------------------<  110<H>  4.3   |  22  |  2.14<H>    Ca    8.0<L>      28 May 2022 01:20  Phos  4.1     05-28  Mg     1.60     05-28    TPro  6.9  /  Alb  3.8  /  TBili  1.2  /  DBili  x   /  AST  9   /  ALT  18  /  AlkPhos  122<H>  05-27    PT/INR - ( 28 May 2022 01:20 )   PT: 13.3 sec;   INR: 1.14 ratio         PTT - ( 28 May 2022 01:20 )  PTT:28.2 sec  Urinalysis Basic - ( 28 May 2022 08:00 )    Color: Yellow / Appearance: Slightly Turbid / SG: >1.050 / pH: x  Gluc: x / Ketone: Negative  / Bili: Negative / Urobili: <2 mg/dL   Blood: x / Protein: 30 mg/dL / Nitrite: Negative   Leuk Esterase: Small / RBC: 25 - 30 /HPF / WBC 15 - 20 /HPF   Sq Epi: x / Non Sq Epi: 5 - 10 /HPF / Bacteria: Few

## 2022-05-28 NOTE — CONSULT NOTE ADULT - ATTENDING COMMENTS
45 y/o man with ~2 cm left renal lesion and proximally associated hematoma. Hemodynamically stable after transfusion. Right nephrectomy may be diagnostic as well as curative but could be elective as bleed appears tamponaded at this time. CT chest to complete staging non-urgently  Rl Wallace MD PhD  Oncology Attending
Patient s/p Abbasi Barres syndrome, bed bound, presents with abdominal pain. CT scan shows large renal hematoma. Patient with initial soft blood pressures.  N mentating well  resp on room air  cv soft bp, will place arterial line, component of hemorrhagic shock, transfuse 2 units prbc  gi npo, ivf  gu/renal appreciate uro recs, if worsens will perform CTA, JUAN LUIS likely from renal hematoma  heme trend hg, transfuse 2 units, reevaluate hg  id no abx  endo no changes    The patient is a critical care patient with life threatening hemodynamic and metabolic instability in SICU.  I have personally interviewed when possible and examined the patient, reviewed data and laboratory tests/x-rays and all pertinent electronic images.  I was physically present for the key portions of the evaluation and management (E/M) service provided.   The SICU team has a constant risk benefit analyzes discussion with the primary team, all consultants, House Staff and PA's on all decisions.  These diagnoses are unrelated to the surgical procedure noted above.  I meet with family if needed to get further history, discuss the case and make care decisions for this patient who might not be able to participate.  Time involved in performance of separately billable procedures was not counted toward my critical care time. There is no overlap.  I spent 55-75 minutes ( 0800Hrs-0915Hrs in AM/ 1600Hrs-1715Hrs in PM, or other time indicated) of critical care time for the diagnoses, assessment, plan and interventions.  This time excludes time spent on separate procedures and teaching.

## 2022-05-28 NOTE — CONSULT NOTE ADULT - SUBJECTIVE AND OBJECTIVE BOX
Hematology/Oncology Consult Note    HPI:  46 male with h/o Guillain barre, CVA, bedbound and no movement of lower extremities, and Left renal subcapsular hematoma 9/2017, presenting to the ED with right sided pain in the lower abdomen, flank and back x 2 days.  Complains of nausea with emesis.  Reports little to no urge to void over the past 24 hours.  Home attendant at the bedside confirms patient has been oliguric over the past 24 hours.  Denies hematuria or dysuria.  Denies fevers.  Initial presentation, patient is hypotensive with BP in the 90s with a normal heart rate.  Hgb 11.2, HCt 34.8.  CT performed showing a right renal subcapsular hematoma.  Patient became tachycardic, and more hypotensive with SBP in the 70s.  Repeat HGB 10.4 HCT 32.1. Patient requiring blood transfusions, and admission to the SICU.  Patient had a left renal subcapsular hematoma 9/2017, requiring transfusions at that time, and ultimately IR intervention without findings of active bleeding.  Patient recently had a toenail removed, and reports excessive bleeding post procedure requiring Surgicel and pressure dressing.  He denies other episodes of poor clotting, bleeding, or easy bruising. He only takes ASA 81mg, denies other forms of anticoagulation/antiplatelet.    (27 May 2022 20:16)      Allergies    apple (Vomiting)  penicillin (Hives)    Intolerances        MEDICATIONS  (STANDING):  acetaminophen   IVPB .. 1000 milliGRAM(s) IV Intermittent every 6 hours  atorvastatin 40 milliGRAM(s) Oral at bedtime  lactated ringers. 1000 milliLiter(s) (100 mL/Hr) IV Continuous <Continuous>  pantoprazole    Tablet 40 milliGRAM(s) Oral before breakfast    MEDICATIONS  (PRN):  oxyCODONE    IR 5 milliGRAM(s) Oral every 4 hours PRN Moderate Pain (4 - 6)  oxyCODONE    IR 10 milliGRAM(s) Oral every 4 hours PRN Severe Pain (7 - 10)      PAST MEDICAL & SURGICAL HISTORY:  Guillain-Mapleton syndrome  HTN (hypertension)  HLD (hyperlipidemia)  CVA (cerebral vascular accident)  Renal hematoma left, 2017, s/p IR angio with no sign of bleed  Tracheostomy, acute management      FAMILY HISTORY: None      SOCIAL HISTORY: No EtOH, no tobacco    REVIEW OF SYSTEMS:    CONSTITUTIONAL: No weakness, fevers or chills  EYES/ENT: No visual changes;  No vertigo or throat pain   NECK: No pain or stiffness  RESPIRATORY: No cough, wheezing, hemoptysis; No shortness of breath  CARDIOVASCULAR: No chest pain or palpitations  GASTROINTESTINAL: + abdominal pain. No nausea, vomiting, or hematemesis; No diarrhea or constipation. No melena or hematochezia.  GENITOURINARY: No dysuria, frequency or hematuria  NEUROLOGICAL: No numbness or weakness  SKIN: No itching, burning, rashes, or lesions   All other review of systems is negative unless indicated above.      Weight (kg): 87.4 (05-27 @ 23:00)    T(F): 97.1 (05-28-22 @ 08:00), Max: 99.4 (05-27-22 @ 19:29)  HR: 92 (05-28-22 @ 12:00) (88 - 105)  BP: 96/46 (05-27-22 @ 21:20)  RR: 14 (05-28-22 @ 12:00)  SpO2: 100% (05-28-22 @ 12:00) (98% - 100%)    Physical Exam  GENERAL: NAD, well-developed  HEAD:  Atraumatic, Normocephalic  EYES: EOMI, PERRLA, conjunctiva and sclera clear  NECK: Supple, No JVD  CHEST/LUNG: Clear to auscultation bilaterally; No wheeze  HEART: Regular rate and rhythm; No murmurs, rubs, or gallops  ABDOMEN: Soft, Nontender, Nondistended; Bowel sounds present  EXTREMITIES:  2+ Peripheral Pulses, No clubbing, cyanosis, or edema  NEUROLOGY: non ambulatory   SKIN: No rashes or lesions                          11.3   11.55 )-----------( 191      ( 28 May 2022 11:29 )             32.9       05-28    131<L>  |  95<L>  |  33<H>  ----------------------------<  92  4.2   |  23  |  2.51<H>    Ca    8.1<L>      28 May 2022 11:29  Phos  4.2     05-28  Mg     1.90     05-28    TPro  6.9  /  Alb  3.8  /  TBili  1.2  /  DBili  x   /  AST  9   /  ALT  18  /  AlkPhos  122<H>  05-27      Phosphorus Level, Serum: 4.2 mg/dL (05-28 @ 11:29)  Magnesium, Serum: 1.90 mg/dL (05-28 @ 11:29)  Phosphorus Level, Serum: 4.1 mg/dL (05-28 @ 01:20)  Magnesium, Serum: 1.60 mg/dL (05-28 @ 01:20)          Imaging:  < from: CT Abdomen and Pelvis w/ IV Cont (05.27.22 @ 17:24) >  IMPRESSION:  Large right subcapsular renal hematoma. Correlate clinically for Page   kidney.    Large right anterior pararenal space hematoma with retroperitoneal   hemorrhage tracking into the right lower abdomen, pelvis, and a right   inguinal hernia.    Increased size of indeterminate 1.8 cm left interpolar renal lesion,   previously demonstrating enhancement and measuring 1.3 cm, suspicious for   neoplasm. Consider further evaluation with contrast-enhanced MRI abdomen.    < end of copied text >  < from: Xray Acute Abdomen > 1 View w/ Chest 1 View (05.27.22 @ 14:43) >  IMPRESSION:  No focal consolidation within the chest.  Nonobstructive gas pattern.    < end of copied text >

## 2022-05-28 NOTE — CONSULT NOTE ADULT - ASSESSMENT
46 year old man with h/o Guillain barre, CVA, bedbound and no movement of lower extremities, and Left renal subcapsular hematoma 9/2017, presenting with right sided pain in the lower abdomen, flank and back x 2 days found to have large R subcapsular hematoma with RP hemorrhage. Oncology consulted for renal lesion.    #Renal lesion  -CT A/P shows 1.8cm L renal lesion concerning for neoplasm with possible cause of hemorrhage but suspicion is low due to size  -s/p 4 units of pRBC, 1 unit of plts, and 1 unit of FFP  -CBC check BID  -Currently hemodynamically stable  -IR aware of patient for possible embolization  -Urology on board as well with plan for possible nephrectomy if embolization unsuccessful   -Please obtain CT chest for staging, would prefer IV contrast but due to JUAN LUIS non contrast reasonable  -Will need further work up, like MRH and biopsy but can obtain this once out of the ICU and stable    Please page with questions or concerns. Will Follow with you.      Sterling Leyva M.D.  Hematology/Oncology Fellow PGY4  Saturday coverage only: Pager [phone]

## 2022-05-28 NOTE — PROGRESS NOTE ADULT - ASSESSMENT
46y Male pmh CVA, R Subcapsular hematoma, Guillan barre (non-ambulatory) presenting initially to ED for 2 days of worsening RLQ pain with radiation to R back a/w progressive abd distention. Has never had similar pain before, sharp, constant and progressive. Severe, limiting PO intake. No fevers/chills, n/v, change in bowel or urinary habits. During stay in ED pt underwent CT abd/pelvis which demonstrated large right anterior pararenal space hematoma with retroperitoneal hemorrhage tracking into the right lower abdomen . Had drop in H/H from 11.2/34.8 -> 10.4/32.1. Transfused one unit pRBCs.  Never hypotensive with maps in 70s however tachy to low 100s with systolics  75-96. Currently mentating well, appears well perfused.     PLAN:   Neurologic:   - hx CVA, Cerebral palsy, non-ambulatory at baseline  - no acute issues, AAOx3 mentating well  - Pain meds: IV Tylenol PRN, Dilaudid 0.5 PRN    Respiratory:   - Sat 98% on RA    Cardiovascular:   - Hemorrhagic shock     Gastrointestinal/Nutrition:   - NPO   - Protonix for GI ppx    Renal/Genitourinary:   - Subcapsular R renal hematoma with RP bleed  - IR consulted -> Recommending CTA if pt becomes unstable, possible embolization  - Urology on back up with possible R nephrectomy if IR embolization not successful  - Difficult Park in ED  - Trend I's & O's    Hematologic:   - s/p 4u pRBC, 1u FFP, 1u platelets  - CBC q6, trend h/h downtrending  - Uro requesting TEG, not available over weekend    Infectious Disease:   - Afebrile  - Monitor off antibiotics    Lines/Tubes:  - PIV x3, L Radial A-line    Endocrine:   - ANA   - Trend blood glucose     Disposition: SICU   46y Male pmh CVA, R Subcapsular hematoma, Guillan barre (non-ambulatory) presenting initially to ED for 2 days of worsening RLQ pain with radiation to R back a/w progressive abd distention. Has never had similar pain before, sharp, constant and progressive. Severe, limiting PO intake. No fevers/chills, n/v, change in bowel or urinary habits. During stay in ED pt underwent CT abd/pelvis which demonstrated large right anterior pararenal space hematoma with retroperitoneal hemorrhage tracking into the right lower abdomen . Had drop in H/H from 11.2/34.8 -> 10.4/32.1. Transfused one unit pRBCs.  Never hypotensive with maps in 70s however tachy to low 100s with systolics  75-96. Currently mentating well, appears well perfused.     PLAN:   Neurologic:   - hx CVA, Guillain barre, non-ambulatory at baseline  - no acute issues, AAOx3 mentating well  - Pain meds: IV Tylenol PRN, oxycodone PRN     Respiratory:   - no acute issues     Cardiovascular: HTN, HLD  - Hemorrhagic shock s/p 4u PRBC 1u Plts 1u FFP  - blood pressure currently stable   -Home BP meds currently on hold (lisinopril 2.5mg daily, carvedilol 6.25 mg BID)     Gastrointestinal/Nutrition:   - Regular diet   - Protonix for GI ppx    Renal/Genitourinary:   - Subcapsular R renal hematoma with RP bleed  - IR consulted -no intervention at this time   - continue menjivar (difficult placement in ED)   - Trend I's & O's    Hematologic:   - s/p 4u pRBC, 1u FFP, 1u platelets  - CBC stable; continue to monitor daily   - restart DVT ppx tomorrow (5/29) and home ASA     Infectious Disease:   - Afebrile  - Monitor off antibiotics    Lines/Tubes:  - PIV x3, L Radial A-line    Endocrine:   - no acive issues     Disposition: SICU

## 2022-05-29 LAB
ANION GAP SERPL CALC-SCNC: 10 MMOL/L — SIGNIFICANT CHANGE UP (ref 7–14)
ANION GAP SERPL CALC-SCNC: 12 MMOL/L — SIGNIFICANT CHANGE UP (ref 7–14)
APTT BLD: 27 SEC — SIGNIFICANT CHANGE UP (ref 27–36.3)
BLD GP AB SCN SERPL QL: NEGATIVE — SIGNIFICANT CHANGE UP
BUN SERPL-MCNC: 31 MG/DL — HIGH (ref 7–23)
BUN SERPL-MCNC: 31 MG/DL — HIGH (ref 7–23)
CALCIUM SERPL-MCNC: 7.8 MG/DL — LOW (ref 8.4–10.5)
CALCIUM SERPL-MCNC: 8 MG/DL — LOW (ref 8.4–10.5)
CHLORIDE SERPL-SCNC: 94 MMOL/L — LOW (ref 98–107)
CHLORIDE SERPL-SCNC: 97 MMOL/L — LOW (ref 98–107)
CO2 SERPL-SCNC: 22 MMOL/L — SIGNIFICANT CHANGE UP (ref 22–31)
CO2 SERPL-SCNC: 24 MMOL/L — SIGNIFICANT CHANGE UP (ref 22–31)
CREAT SERPL-MCNC: 2.07 MG/DL — HIGH (ref 0.5–1.3)
CREAT SERPL-MCNC: 2.37 MG/DL — HIGH (ref 0.5–1.3)
CULTURE RESULTS: NO GROWTH — SIGNIFICANT CHANGE UP
EGFR: 33 ML/MIN/1.73M2 — LOW
EGFR: 39 ML/MIN/1.73M2 — LOW
GLUCOSE SERPL-MCNC: 100 MG/DL — HIGH (ref 70–99)
GLUCOSE SERPL-MCNC: 102 MG/DL — HIGH (ref 70–99)
HCT VFR BLD CALC: 23.3 % — LOW (ref 39–50)
HCT VFR BLD CALC: 24.1 % — LOW (ref 39–50)
HCT VFR BLD CALC: 28.9 % — LOW (ref 39–50)
HGB BLD-MCNC: 7.7 G/DL — LOW (ref 13–17)
HGB BLD-MCNC: 7.9 G/DL — LOW (ref 13–17)
HGB BLD-MCNC: 9.9 G/DL — LOW (ref 13–17)
INR BLD: 1.14 RATIO — SIGNIFICANT CHANGE UP (ref 0.88–1.16)
MAGNESIUM SERPL-MCNC: 1.8 MG/DL — SIGNIFICANT CHANGE UP (ref 1.6–2.6)
MAGNESIUM SERPL-MCNC: 2.1 MG/DL — SIGNIFICANT CHANGE UP (ref 1.6–2.6)
MCHC RBC-ENTMCNC: 28.3 PG — SIGNIFICANT CHANGE UP (ref 27–34)
MCHC RBC-ENTMCNC: 28.5 PG — SIGNIFICANT CHANGE UP (ref 27–34)
MCHC RBC-ENTMCNC: 29.1 PG — SIGNIFICANT CHANGE UP (ref 27–34)
MCHC RBC-ENTMCNC: 32.8 GM/DL — SIGNIFICANT CHANGE UP (ref 32–36)
MCHC RBC-ENTMCNC: 33 GM/DL — SIGNIFICANT CHANGE UP (ref 32–36)
MCHC RBC-ENTMCNC: 34.3 GM/DL — SIGNIFICANT CHANGE UP (ref 32–36)
MCV RBC AUTO: 85 FL — SIGNIFICANT CHANGE UP (ref 80–100)
MCV RBC AUTO: 86.3 FL — SIGNIFICANT CHANGE UP (ref 80–100)
MCV RBC AUTO: 86.4 FL — SIGNIFICANT CHANGE UP (ref 80–100)
NRBC # BLD: 0 /100 WBCS — SIGNIFICANT CHANGE UP
NRBC # FLD: 0 K/UL — SIGNIFICANT CHANGE UP
PHOSPHATE SERPL-MCNC: 3.5 MG/DL — SIGNIFICANT CHANGE UP (ref 2.5–4.5)
PHOSPHATE SERPL-MCNC: 3.7 MG/DL — SIGNIFICANT CHANGE UP (ref 2.5–4.5)
PLATELET # BLD AUTO: 160 K/UL — SIGNIFICANT CHANGE UP (ref 150–400)
PLATELET # BLD AUTO: 169 K/UL — SIGNIFICANT CHANGE UP (ref 150–400)
PLATELET # BLD AUTO: 200 K/UL — SIGNIFICANT CHANGE UP (ref 150–400)
POTASSIUM SERPL-MCNC: 4.1 MMOL/L — SIGNIFICANT CHANGE UP (ref 3.5–5.3)
POTASSIUM SERPL-MCNC: 4.7 MMOL/L — SIGNIFICANT CHANGE UP (ref 3.5–5.3)
POTASSIUM SERPL-SCNC: 4.1 MMOL/L — SIGNIFICANT CHANGE UP (ref 3.5–5.3)
POTASSIUM SERPL-SCNC: 4.7 MMOL/L — SIGNIFICANT CHANGE UP (ref 3.5–5.3)
PROTHROM AB SERPL-ACNC: 13.3 SEC — SIGNIFICANT CHANGE UP (ref 10.5–13.4)
RBC # BLD: 2.7 M/UL — LOW (ref 4.2–5.8)
RBC # BLD: 2.79 M/UL — LOW (ref 4.2–5.8)
RBC # BLD: 3.4 M/UL — LOW (ref 4.2–5.8)
RBC # FLD: 14.9 % — HIGH (ref 10.3–14.5)
RBC # FLD: 15 % — HIGH (ref 10.3–14.5)
RBC # FLD: 15.1 % — HIGH (ref 10.3–14.5)
RH IG SCN BLD-IMP: POSITIVE — SIGNIFICANT CHANGE UP
SODIUM SERPL-SCNC: 128 MMOL/L — LOW (ref 135–145)
SODIUM SERPL-SCNC: 131 MMOL/L — LOW (ref 135–145)
SPECIMEN SOURCE: SIGNIFICANT CHANGE UP
WBC # BLD: 11.09 K/UL — HIGH (ref 3.8–10.5)
WBC # BLD: 11.62 K/UL — HIGH (ref 3.8–10.5)
WBC # BLD: 12.73 K/UL — HIGH (ref 3.8–10.5)
WBC # FLD AUTO: 11.09 K/UL — HIGH (ref 3.8–10.5)
WBC # FLD AUTO: 11.62 K/UL — HIGH (ref 3.8–10.5)
WBC # FLD AUTO: 12.73 K/UL — HIGH (ref 3.8–10.5)

## 2022-05-29 PROCEDURE — 99292 CRITICAL CARE ADDL 30 MIN: CPT

## 2022-05-29 PROCEDURE — 99291 CRITICAL CARE FIRST HOUR: CPT

## 2022-05-29 PROCEDURE — 74174 CTA ABD&PLVS W/CONTRAST: CPT | Mod: 26

## 2022-05-29 PROCEDURE — 99231 SBSQ HOSP IP/OBS SF/LOW 25: CPT

## 2022-05-29 PROCEDURE — 71250 CT THORAX DX C-: CPT | Mod: 26

## 2022-05-29 RX ORDER — ACETAMINOPHEN 500 MG
975 TABLET ORAL EVERY 6 HOURS
Refills: 0 | Status: DISCONTINUED | OUTPATIENT
Start: 2022-05-29 | End: 2022-06-03

## 2022-05-29 RX ORDER — SODIUM CHLORIDE 9 MG/ML
1 INJECTION INTRAMUSCULAR; INTRAVENOUS; SUBCUTANEOUS EVERY 12 HOURS
Refills: 0 | Status: DISCONTINUED | OUTPATIENT
Start: 2022-05-29 | End: 2022-06-02

## 2022-05-29 RX ORDER — MAGNESIUM SULFATE 500 MG/ML
1 VIAL (ML) INJECTION ONCE
Refills: 0 | Status: COMPLETED | OUTPATIENT
Start: 2022-05-29 | End: 2022-05-29

## 2022-05-29 RX ORDER — SODIUM CHLORIDE 9 MG/ML
1000 INJECTION INTRAMUSCULAR; INTRAVENOUS; SUBCUTANEOUS ONCE
Refills: 0 | Status: COMPLETED | OUTPATIENT
Start: 2022-05-29 | End: 2022-05-29

## 2022-05-29 RX ADMIN — SODIUM CHLORIDE 1000 MILLILITER(S): 9 INJECTION INTRAMUSCULAR; INTRAVENOUS; SUBCUTANEOUS at 10:48

## 2022-05-29 RX ADMIN — OXYCODONE HYDROCHLORIDE 5 MILLIGRAM(S): 5 TABLET ORAL at 14:42

## 2022-05-29 RX ADMIN — Medication 975 MILLIGRAM(S): at 11:22

## 2022-05-29 RX ADMIN — OXYCODONE HYDROCHLORIDE 10 MILLIGRAM(S): 5 TABLET ORAL at 00:30

## 2022-05-29 RX ADMIN — Medication 400 MILLIGRAM(S): at 00:26

## 2022-05-29 RX ADMIN — Medication 975 MILLIGRAM(S): at 17:48

## 2022-05-29 RX ADMIN — SODIUM CHLORIDE 1 GRAM(S): 9 INJECTION INTRAMUSCULAR; INTRAVENOUS; SUBCUTANEOUS at 06:18

## 2022-05-29 RX ADMIN — SODIUM CHLORIDE 1 GRAM(S): 9 INJECTION INTRAMUSCULAR; INTRAVENOUS; SUBCUTANEOUS at 17:48

## 2022-05-29 RX ADMIN — OXYCODONE HYDROCHLORIDE 5 MILLIGRAM(S): 5 TABLET ORAL at 14:07

## 2022-05-29 RX ADMIN — ATORVASTATIN CALCIUM 40 MILLIGRAM(S): 80 TABLET, FILM COATED ORAL at 21:45

## 2022-05-29 RX ADMIN — OXYCODONE HYDROCHLORIDE 10 MILLIGRAM(S): 5 TABLET ORAL at 00:26

## 2022-05-29 RX ADMIN — SODIUM CHLORIDE 100 MILLILITER(S): 9 INJECTION, SOLUTION INTRAVENOUS at 07:53

## 2022-05-29 RX ADMIN — Medication 1000 MILLIGRAM(S): at 00:30

## 2022-05-29 RX ADMIN — Medication 975 MILLIGRAM(S): at 12:32

## 2022-05-29 RX ADMIN — Medication 100 GRAM(S): at 17:57

## 2022-05-29 RX ADMIN — PANTOPRAZOLE SODIUM 40 MILLIGRAM(S): 20 TABLET, DELAYED RELEASE ORAL at 07:53

## 2022-05-29 RX ADMIN — Medication 975 MILLIGRAM(S): at 18:00

## 2022-05-29 NOTE — PROGRESS NOTE ADULT - SUBJECTIVE AND OBJECTIVE BOX
SICU AM PROGRESS NOTE  ===============================  HPI  46 male with h/o Guillain barre, CVA, bedbound and no movement of lower extremities, and Left renal subcapsular hematoma 9/2017, presenting to the ED with right sided pain in the lower abdomen, flank and back x 2 days.  Complains of nausea with emesis.  Reports little to no urge to void over the past 24 hours.  Home attendant at the bedside confirms patient has been oliguric over the past 24 hours.  Denies hematuria or dysuria.  Denies fevers.  Initial presentation, patient is hypotensive with BP in the 90s with a normal heart rate.  Hgb 11.2, HCt 34.8.  CT performed showing a right renal subcapsular hematoma.  Patient became tachycardic, and more hypotensive with SBP in the 70s.  Repeat HGB 10.4 HCT 32.1. Patient requiring blood transfusions, and admission to the SICU.  Patient had a left renal subcapsular hematoma 9/2017, requiring transfusions at that time, and ultimately IR intervention without findings of active bleeding.  Patient recently had a toenail removed, and reports excessive bleeding post procedure requiring Surgicel and pressure dressing.  He denies other episodes of poor clotting, bleeding, or easy bruising. He only takes ASA 81mg, denies other forms of anticoagulation/antiplatelet.     Interval Events:   -CBC stable   -restarted on regular diet     MEDICATIONS  (STANDING):  atorvastatin 40 milliGRAM(s) Oral at bedtime  lactated ringers. 1000 milliLiter(s) (100 mL/Hr) IV Continuous <Continuous>  pantoprazole    Tablet 40 milliGRAM(s) Oral before breakfast  sodium chloride 1 Gram(s) Oral every 12 hours    MEDICATIONS  (PRN):  oxyCODONE    IR 5 milliGRAM(s) Oral every 4 hours PRN Moderate Pain (4 - 6)  oxyCODONE    IR 10 milliGRAM(s) Oral every 4 hours PRN Severe Pain (7 - 10)    ICU Vital Signs Last 24 Hrs  T(C): 36.8 (29 May 2022 00:00), Max: 36.8 (28 May 2022 20:00)  T(F): 98.3 (29 May 2022 00:00), Max: 98.3 (28 May 2022 20:00)  HR: 102 (29 May 2022 03:00) (88 - 112)  BP: 95/61 (29 May 2022 03:00) (93/62 - 95/61)  BP(mean): 72 (29 May 2022 03:00) (72 - 72)  ABP: 117/73 (29 May 2022 00:00) (93/59 - 135/78)  ABP(mean): 261 (29 May 2022 01:00) (70 - 261)  RR: 14 (29 May 2022 03:00) (11 - 27)  SpO2: 100% (29 May 2022 03:00) (99% - 100%)    I&O's Detail    27 May 2022 07:01  -  28 May 2022 07:00  --------------------------------------------------------  IN:    Lactated Ringers: 400 mL  Total IN: 400 mL    OUT:    Indwelling Catheter - Urethral (mL): 40 mL    Voided (mL): 50 mL  Total OUT: 90 mL    Total NET: 310 mL      28 May 2022 07:01  -  29 May 2022 03:41  --------------------------------------------------------  IN:    IV PiggyBack: 375 mL    Lactated Ringers: 1900 mL    Oral Fluid: 120 mL    Plasma: 300 mL  Total IN: 2695 mL    OUT:    Indwelling Catheter - Urethral (mL): 1060 mL  Total OUT: 1060 mL    Total NET: 1635 mL      --------------------------------------------------------------------------------------    EXAM  NEUROLOGY  Exam: Normal, NAD, alert, oriented x3, no focal deficits. PERRLA.       RESPIRATORY  Exam: Lungs clear to auscultation, Normal expansion/effort.    CARDIOVASCULAR  Exam: S1, S2.  Regular rate and rhythm     GI/NUTRITION  Exam: Abdomen soft, Non-tender, moderately distended  Current Diet:  NPO    METABOLIC/FLUIDS/ELECTROLYTES  lactated ringers. 1000 milliLiter(s) IV Continuous <Continuous>      HEMATOLOGIC  [x] DVT Prophylaxis:   Transfusions:	[] PRBC	[] Platelets		[] FFP	[] Cryoprecipitate    INFECTIOUS DISEASE  Antimicrobials/Immunologic Medications:      VASCULAR  Exam: Extremities warm, pink, well-perfused.      MUSCULOSKELETAL  Exam: All extremities moving spontaneously without limitations.      SKIN  Exam: Good skin turgor, no skin breakdown.     LABS  --------------------------------------------------------------------------------------  CBC (05-29 @ 00:45)                              9.9<L>                         12.73<H>  )----------------(  200        --    % Neutrophils, --    % Lymphocytes, ANC: --                                  28.9<L>  CBC (05-28 @ 11:29)                              11.3<L>                         11.55<H>  )----------------(  191        --    % Neutrophils, --    % Lymphocytes, ANC: --                                  32.9<L>    BMP (05-29 @ 00:45)             128<L>  |  94<L>   |  31<H> 		Ca++ --      Ca 8.0<L>             ---------------------------------( 102<H>		Mg 2.10               4.1     |  24      |  2.37<H>			Ph 3.7     BMP (05-28 @ 11:29)             131<L>  |  95<L>   |  33<H> 		Ca++ --      Ca 8.1<L>             ---------------------------------( 92    		Mg 1.90               4.2     |  23      |  2.51<H>			Ph 4.2         Coags (05-29 @ 00:45)  aPTT 27.0 / INR 1.14 / PT 13.3  Coags (05-28 @ 01:20)  aPTT 28.2 / INR 1.14 / PT 13.3      ABG (05-28 @ 01:20)     7.39 / 41 / 93 / 25 / -0.2 / 98.6<H>%     Lactate:          --------------------------------------------------------------------------------------

## 2022-05-29 NOTE — PROGRESS NOTE ADULT - ASSESSMENT
47 yo M h/o Guillain barre, CVA, bedbound and no movement of lower extremities, and Left renal subcapsular hematoma 9/2017, presented to the ED 5/27/2022 with right sided pain in the lower abdomen, flank and back x 2 days, N/V.  Reports little to no urge to void over the past 24 hours.  Home attendant at the bedside confirms patient has been oliguric over the past 24 hours.  Denies hematuria or dysuria.  Denies fevers. Patient was hypotensive with BP in the 90s with a normal heart rate.  Hgb 11.2, HCt 34.8.  CT performed showing a right renal subcapsular hematoma.  Patient became tachycardic, and more hypotensive with SBP in the 70s.  Repeat HGB 10.4 HCT 32.1. Patient transfused and admitted to SICU.  Patient had a left renal subcapsular hematoma 9/2017, requiring transfusions at that time, and ultimately IR intervention without findings of active bleeding.  Patient recently had a toenail removed, and reports excessive bleeding post procedure requiring Surgicel and pressure dressing.  He denies other episodes of poor clotting, bleeding, or easy bruising. He only takes ASA 81mg, denies other forms of anticoagulation/antiplatelet.     Plan:  -monitor UO  -monitor HCT; continues to downtrend  -monitor abdominal exam  -appreciate heme consult --> FU chest CT  -pain control  -continue menjivar  -reg diet  -FU IR consult for angio/embo  -appreciate SICU care   47 yo M h/o Guillain barre, CVA, bedbound and no movement of lower extremities, and Left renal subcapsular hematoma 9/2017, presented to the ED 5/27/2022 with right sided pain in the lower abdomen, flank and back x 2 days, N/V.  Reports little to no urge to void over the past 24 hours.  Home attendant at the bedside confirms patient has been oliguric over the past 24 hours.  Denies hematuria or dysuria.  Denies fevers. Patient was hypotensive with BP in the 90s with a normal heart rate.  Hgb 11.2, HCt 34.8.  CT performed showing a right renal subcapsular hematoma.  Patient became tachycardic, and more hypotensive with SBP in the 70s.  Repeat HGB 10.4 HCT 32.1. Patient transfused and admitted to SICU.  Patient had a left renal subcapsular hematoma 9/2017, requiring transfusions at that time, and ultimately IR intervention without findings of active bleeding.  Patient recently had a toenail removed, and reports excessive bleeding post procedure requiring Surgicel and pressure dressing.  He denies other episodes of poor clotting, bleeding, or easy bruising. He only takes ASA 81mg, denies other forms of anticoagulation/antiplatelet.     5/27: admitted to SICU for hemodynamic monitoring in setting of large R subcapsular hematoma, transfused 3u pRBC  5/28: transfused 1u pRBC, 1 FFP, 1 platelets; heme-onc consulted, recommend chest CT  5/29: Hct continues to downtrending, IR consult pending    Plan:  -monitor UO  -monitor HCT; continues to downtrend  -monitor abdominal exam  -appreciate heme consult --> recommend chest CT  -pain control  -continue menjivar  -reg diet  -FU IR consult for angio/embo  -appreciate SICU care

## 2022-05-29 NOTE — PROGRESS NOTE ADULT - ASSESSMENT
46y Male pmh CVA, R Subcapsular hematoma, Guillan barre (non-ambulatory) presenting initially to ED for 2 days of worsening RLQ pain with radiation to R back a/w progressive abd distention. Has never had similar pain before, sharp, constant and progressive. Severe, limiting PO intake. No fevers/chills, n/v, change in bowel or urinary habits. During stay in ED pt underwent CT abd/pelvis which demonstrated large right anterior pararenal space hematoma with retroperitoneal hemorrhage tracking into the right lower abdomen . Had drop in H/H from 11.2/34.8 -> 10.4/32.1. Transfused one unit pRBCs.  Never hypotensive with maps in 70s however tachy to low 100s with systolics  75-96. Currently mentating well, appears well perfused.     PLAN:   Neurologic:   - hx CVA, Guillain barre, non-ambulatory at baseline  - no acute issues, AAOx3 mentating well  - Pain meds: IV Tylenol PRN, oxycodone PRN     Respiratory:   - no acute issues     Cardiovascular: HTN, HLD  - Hemorrhagic shock s/p 4u PRBC 1u Plts 1u FFP  - blood pressure currently stable   -Home BP meds currently on hold (lisinopril 2.5mg daily, carvedilol 6.25 mg BID)     Gastrointestinal/Nutrition:   - Regular diet   - Protonix for GI ppx    Renal/Genitourinary:   - Subcapsular R renal hematoma with RP bleed  - IR consulted -no intervention at this time   - continue menjivar (difficult placement in ED)   - Trend I's & O's    Hematologic:   - s/p 4u pRBC, 1u FFP, 1u platelets  - CBC stable; continue to monitor daily   - restart DVT ppx tomorrow (5/29) and home ASA     Infectious Disease:   - Afebrile  - Monitor off antibiotics    Lines/Tubes:  - PIV x3, L Radial A-line    Endocrine:   - no acive issues     Disposition: SICU   46y Male pmh CVA, R Subcapsular hematoma, Guillan barre (non-ambulatory) presenting initially to ED for 2 days of worsening RLQ pain with radiation to R back a/w progressive abd distention. Has never had similar pain before, sharp, constant and progressive. Severe, limiting PO intake. No fevers/chills, n/v, change in bowel or urinary habits. During stay in ED pt underwent CT abd/pelvis which demonstrated large right anterior pararenal space hematoma with retroperitoneal hemorrhage tracking into the right lower abdomen . Had drop in H/H from 11.2/34.8 -> 10.4/32.1. Transfused one unit pRBCs.  Never hypotensive with maps in 70s however tachy to low 100s with systolics  75-96. Currently mentating well, appears well perfused.     PLAN:   Neurologic:   - hx CVA, Guillain barre, non-ambulatory at baseline  - no acute issues, AAOx3 mentating well  - Pain meds: IV Tylenol PRN, oxycodone PRN     Respiratory:   - no acute issues     Cardiovascular: HTN, HLD  - Hemorrhagic shock s/p 4u PRBC 1u Plts 1u FFP  - blood pressure currently stable   -Home BP meds currently on hold (lisinopril 2.5mg daily, carvedilol 6.25 mg BID)     Gastrointestinal/Nutrition:   - Regular diet--made NPO for possible IR intervention   - Protonix for GI ppx    Renal/Genitourinary:   - Subcapsular R renal hematoma with RP bleed  - IR consulted -recommending CTAngio to localize bleeding; CTAngio ordered   - continue menjivar (difficult placement in ED)   - 1L NS given for low UOP with improvement     Hematologic:   - s/p 4u pRBC, 1u FFP, 1u platelets  - CBC downtrending today; continue to monitor q12   - will hold DVT ppx     Infectious Disease:   - Afebrile  - Monitor off antibiotics    Lines/Tubes:  - PIV x3, L Radial A-line    Endocrine:   - no active issues     Disposition: SICU

## 2022-05-29 NOTE — PROGRESS NOTE ADULT - SUBJECTIVE AND OBJECTIVE BOX
IR recommending CTA to visualize location of bleed prior to any intervention/agiogram/embolization.   Per IR, it is difficult to say where the bleeding is originating from, venous versus arterial, also unclear where the current source is if it's an arterial bleed, RP from a lumbar versus a renal branch. CTA would give global picture and give a target for treatment if there is one and so should be performed prior to any invasive intervention.     Though patient's creatinine is elevated, given that patient continues to bleed and angio/embo is likely required and CTA would be necessary to localize the bleeding for the aforementioned concerns by IR.   Discussed with attending.

## 2022-05-29 NOTE — PROGRESS NOTE ADULT - SUBJECTIVE AND OBJECTIVE BOX
Subjective  Had abdominal pain yesterday, however currently states he is comfortable without any pain. Hct continues to downtrend, now 24.1 from 28.9 overnight.    Objective    Vital signs  T(F): , Max: 98.4 (05-29-22 @ 04:00)  HR: 106 (05-29-22 @ 09:00)  BP: 106/80 (05-29-22 @ 09:00)  SpO2: 100% (05-29-22 @ 09:00)  Wt(kg): --    Output     OUT:    Indwelling Catheter - Urethral (mL): 1195 mL  Total OUT: 1195 mL    Total NET: -1195 mL      OUT:    Indwelling Catheter - Urethral (mL): 35 mL  Total OUT: 35 mL    Total NET: -35 mL          Gen: NAD  Abd: abdomen distended, right sided/flank tenderness, no flank ecchymoses appreciated  : menjivar secured        Labs      05-29 @ 08:51    WBC 11.62 / Hct 24.1  / SCr --       05-29 @ 00:45    WBC 12.73 / Hct 28.9  / SCr 2.37

## 2022-05-29 NOTE — CONSULT NOTE ADULT - SUBJECTIVE AND OBJECTIVE BOX
Vascular & Interventional Radiology Brief Consult Note    Evaluate for Procedure: Possible angiogram/embolization of perinephric/RP hematoma    HPI: 46 male with h/o Guillain barre, CVA, bedbound and no movement of lower extremities, and Left renal subcapsular hematoma 9/2017, presenting to the ED with right sided pain in the lower abdomen, flank and back x 2 days.  CT performed showing a right renal subcapsular hematoma.  Patient became tachycardic, and more hypotensive with SBP in the 70s.  Repeat HGB 10.4 HCT 32.1. Patient requiring blood transfusions, and admission to the SICU.  Patient had a left renal subcapsular hematoma 9/2017, requiring transfusions at that time, and ultimately IR intervention without findings of active bleeding.  He only takes ASA 81mg, denies other forms of anticoagulation/antiplatelet.     IR called by ED Friday evening while in another emergent case about a patient in the ED with these symptoms. No formal consult was placed and the patient name was not provided at that time but the recommendation was made to perform a CTA to evaluate for areas of active bleeding and to place a formal consult. CTA had not been performed and consult was not placed.    Allergies: penicillin (Hives)    Medications (Abx/Cardiac/Anticoagulation/Blood Products)      Data:    T(C): 36.2  HR: 106  BP: 106/80  RR: 15  SpO2: 100%    -WBC 11.62 / HgB 7.9 / Hct 24.1 / Plt 160  -Na 128 / Cl 94 / BUN 31 / Glucose 102  -K 4.1 / CO2 24 / Cr 2.37  -ALT -- / Alk Phos -- / T.Bili --  -INR1.14    Imaging: Reviewed    Recommendations  - Resuscitate as needed  - Please obtain CTA to evaluate for areas of active bleeding  - Should the patient become hemodynamically unstable, do not hesitate to reach out for more urgent evaluation  - Case discussed with SANTOS Barriga (SICU) Vascular & Interventional Radiology Brief Consult Note    Evaluate for Procedure: Possible angiogram/embolization of perinephric/RP hematoma    HPI: 46 male with h/o Guillain barre, CVA, bedbound and no movement of lower extremities, and Left renal subcapsular hematoma 9/2017, presenting to the ED with right sided pain in the lower abdomen, flank and back x 2 days.  CT performed showing a right renal subcapsular hematoma.  Patient became tachycardic, and more hypotensive with SBP in the 70s.  Repeat HGB 10.4 HCT 32.1. Patient requiring blood transfusions, and admission to the SICU.  Patient had a left renal subcapsular hematoma 9/2017, requiring transfusions at that time, and ultimately IR intervention without findings of active bleeding.  He only takes ASA 81mg, denies other forms of anticoagulation/antiplatelet.     IR called by ED Friday evening while in another emergent case about a patient in the ED with these symptoms. No formal consult was placed but the recommendation was made to perform a CTA to evaluate for areas of active bleeding and to place a formal consult. CTA had not been performed and consult was not placed.    Allergies: penicillin (Hives)    Medications (Abx/Cardiac/Anticoagulation/Blood Products)      Data:    T(C): 36.2  HR: 106  BP: 106/80  RR: 15  SpO2: 100%    -WBC 11.62 / HgB 7.9 / Hct 24.1 / Plt 160  -Na 128 / Cl 94 / BUN 31 / Glucose 102  -K 4.1 / CO2 24 / Cr 2.37  -ALT -- / Alk Phos -- / T.Bili --  -INR1.14    Imaging: Reviewed    Recommendations  - Resuscitate as needed  - Please obtain CTA to evaluate for areas of active bleeding  - Should the patient become hemodynamically unstable, do not hesitate to reach out for more urgent evaluation  - Case discussed with SANTOS Barriga (SICU) Vascular & Interventional Radiology Brief Consult Note    Evaluate for Procedure: Possible angiogram/embolization of perinephric/RP hematoma    HPI: 46 male with h/o Guillain barre, CVA, bedbound and no movement of lower extremities, and Left renal subcapsular hematoma 9/2017, presenting to the ED with right sided pain in the lower abdomen, flank and back x 2 days.  CT performed showing a right renal subcapsular hematoma.  Patient became tachycardic, and more hypotensive with SBP in the 70s.  Repeat HGB 10.4 HCT 32.1. Patient requiring blood transfusions, and admission to the SICU.  Patient had a left renal subcapsular hematoma 9/2017, requiring transfusions at that time, and ultimately IR intervention without findings of active bleeding.  He only takes ASA 81mg, denies other forms of anticoagulation/antiplatelet.     IR called by ED Friday evening while in another emergent case about a patient in the ED with these symptoms. No formal consult was placed but the recommendation was made to perform a CTA to evaluate for areas of active bleeding and to place a formal consult. CTA had not been performed and consult was not placed.    Allergies: penicillin (Hives)    Medications (Abx/Cardiac/Anticoagulation/Blood Products)      Data:    T(C): 36.2  HR: 106  BP: 106/80  RR: 15  SpO2: 100%    -WBC 11.62 / HgB 7.9 / Hct 24.1 / Plt 160  -Na 128 / Cl 94 / BUN 31 / Glucose 102  -K 4.1 / CO2 24 / Cr 2.37  -ALT -- / Alk Phos -- / T.Bili --  -INR1.14    Imaging: Reviewed    Recommendations  - Resuscitate and hydrate as needed  - Please obtain CTA to evaluate for areas of active bleeding. It is difficult to say where the bleeding is originating from, venous versus arterial, also unclear where the current source is if it's an arterial bleed, RP from a lumbar versus a renal branch. CTA would give global picture and give a target for treatment if there is one and so should be performed prior to any invasive intervention.   - Aware of the patients worsened renal function. Appreciate Renal/Uro recs.   - Should the patient become hemodynamically unstable, do not hesitate to reach out for more urgent evaluation  - Case discussed with SANTOS Barriga (SICU) and SICU resident. Vascular & Interventional Radiology Brief Consult Note    Evaluate for Procedure: Possible angiogram/embolization of perinephric/RP hematoma    HPI: 46 male with h/o Guillain barre, CVA, bedbound and no movement of lower extremities, and Left renal subcapsular hematoma 9/2017, presenting to the ED with right sided pain in the lower abdomen, flank and back x 2 days.  CT performed showing a right renal subcapsular hematoma.  Patient became tachycardic, and more hypotensive with SBP in the 70s.  Repeat HGB 10.4 HCT 32.1. Patient requiring blood transfusions, and admission to the SICU.  Patient had a left renal subcapsular hematoma 9/2017, requiring transfusions at that time, and ultimately IR intervention without findings of active bleeding.  He only takes ASA 81mg, denies other forms of anticoagulation/antiplatelet.     IR called by ED Friday evening while in another emergent case about a patient in the ED with these symptoms. No formal consult was placed but the recommendation was made to perform a CTA to evaluate for areas of active bleeding and to place a formal consult as previous study was venous phase and did not show active bleeding. CTA had not been performed and consult was not placed.    Allergies: penicillin (Hives)    Medications (Abx/Cardiac/Anticoagulation/Blood Products)      Data:    T(C): 36.2  HR: 106  BP: 106/80  RR: 15  SpO2: 100%    -WBC 11.62 / HgB 7.9 / Hct 24.1 / Plt 160  -Na 128 / Cl 94 / BUN 31 / Glucose 102  -K 4.1 / CO2 24 / Cr 2.37  -ALT -- / Alk Phos -- / T.Bili --  -INR1.14    Imaging: Reviewed    Recommendations  - Resuscitate and hydrate as needed  - Please obtain CTA to evaluate for areas of active bleeding. Previous venous phase study on Friday night did not demonstrate active bleeding. It is difficult to say where the bleeding is originating from, venous versus arterial, also unclear where the current source is if it's an arterial bleed, RP from a lumbar versus a renal branch. CTA would give global picture and give a target for treatment if there is one and so should be performed prior to any invasive intervention.   - Aware of the patients worsened renal function. Defer to primary team/Nephrology/Urology regarding risks/benefits of repeat imaging causing worsening renal function versus risk of continued bleeding  - Should the patient become hemodynamically unstable, do not hesitate to reach out for more urgent evaluation  - Case discussed with SANTOS Barriga (SICU) and SICU resident.

## 2022-05-29 NOTE — CHART NOTE - NSCHARTNOTEFT_GEN_A_CORE
Hematology- Oncology On Call    46 year old man with h/o Guillain barre, CVA, bedbound and no movement of lower extremities, and Left renal subcapsular hematoma 9/2017, who presented with right sided pain in the lower abdomen, flank and back x 2 days found to have large R subcapsular hematoma with RP hemorrhage. Hematology-oncology called regarding concern for possible bleeding diathesis. Patient has had major bleeding, require 4 units PRBC.     Labs, personally reviewed by me.     LABS:                        7.7    11.09 )-----------( 169      ( 29 May 2022 17:04 )             23.3     Hgb Trend: 7.7<--, 7.9<--, 9.9<--, 11.3<--, 12.9<--  05-29    131<L>  |  97<L>  |  31<H>  ----------------------------<  100<H>  4.7   |  22  |  2.07<H>    Ca    7.8<L>      29 May 2022 17:04  Phos  3.5     05-29  Mg     1.80     05-29      Creatinine Trend: 2.07<--, 2.37<--, 2.51<--, 2.14<--, 2.07<--  PT/INR - ( 29 May 2022 00:45 )   PT: 13.3 sec;   INR: 1.14 ratio         PTT - ( 29 May 2022 00:45 )  PTT:27.0 sec  Urinalysis Basic - ( 28 May 2022 08:00 )    Color: Yellow / Appearance: Slightly Turbid / SG: >1.050 / pH: x  Gluc: x / Ketone: Negative  / Bili: Negative / Urobili: <2 mg/dL   Blood: x / Protein: 30 mg/dL / Nitrite: Negative   Leuk Esterase: Small / RBC: 25 - 30 /HPF / WBC 15 - 20 /HPF   Sq Epi: x / Non Sq Epi: 5 - 10 /HPF / Bacteria: Few    Arterial Blood Gas:  05-28 @ 01:20  7.39/41/93/25/98.6/-0.2  ABG lactate: --    -This patient has normal PT, PTT, platelet count.   -Although patient has elevated SCr, he is not uremic.  -His liver enzymes appear mostly normal.   -He is not currently on antiplatelet agents.    The patient's history as noted in chart indicates he has history of CVA and L renal capsular hematoma. CVA history is unclear. There does not seem to be other indicators of history of major bleeding.     - Please check fibrinogen and D-dimer level. Give cryoprecipitate if patient is actively bleeding to maintain fibrinogen level above 100mg/dl.   - Check plasma VWF antigen, VWF activity (ristocetin cofactor and collagen binding), and plasma factor VIII activity.   - Check platelet aggregation study      Please do not hesitate to page with questions.  Karen Nguyen MD  Hematology-oncology PGY4   [phone] Hematology- Oncology On Call    46 year old man with h/o Guillain barre, CVA, bedbound and no movement of lower extremities, and Left renal subcapsular hematoma 9/2017, who presented with right sided pain in the lower abdomen, flank and back x 2 days found to have large R subcapsular hematoma with RP hemorrhage. Hematology-oncology called regarding concern for possible bleeding diathesis. Patient has had major bleeding, require 4 units PRBC.     Labs, personally reviewed by me.     LABS:                        7.7    11.09 )-----------( 169      ( 29 May 2022 17:04 )             23.3     Hgb Trend: 7.7<--, 7.9<--, 9.9<--, 11.3<--, 12.9<--  05-29    131<L>  |  97<L>  |  31<H>  ----------------------------<  100<H>  4.7   |  22  |  2.07<H>    Ca    7.8<L>      29 May 2022 17:04  Phos  3.5     05-29  Mg     1.80     05-29      Creatinine Trend: 2.07<--, 2.37<--, 2.51<--, 2.14<--, 2.07<--  PT/INR - ( 29 May 2022 00:45 )   PT: 13.3 sec;   INR: 1.14 ratio         PTT - ( 29 May 2022 00:45 )  PTT:27.0 sec  Urinalysis Basic - ( 28 May 2022 08:00 )    Color: Yellow / Appearance: Slightly Turbid / SG: >1.050 / pH: x  Gluc: x / Ketone: Negative  / Bili: Negative / Urobili: <2 mg/dL   Blood: x / Protein: 30 mg/dL / Nitrite: Negative   Leuk Esterase: Small / RBC: 25 - 30 /HPF / WBC 15 - 20 /HPF   Sq Epi: x / Non Sq Epi: 5 - 10 /HPF / Bacteria: Few    Arterial Blood Gas:  05-28 @ 01:20  7.39/41/93/25/98.6/-0.2  ABG lactate: --    -This patient has normal PT, PTT, platelet count.   -Although patient has elevated SCr, he is not uremic.  -His liver enzymes appear mostly normal.   -He is not currently on antiplatelet agents.    The patient's history as noted in chart indicates he has history of CVA and L renal capsular hematoma. CVA history is unclear. There does not seem to be other indicators of history of major bleeding.     - Please check fibrinogen and D-dimer level. Give cryoprecipitate if patient is actively bleeding to maintain fibrinogen level above 100mg/dl.   - Check plasma VWF antigen, VWF activity (ristocetin cofactor and collagen binding), and plasma factor VIII activity.   - Check platelet aggregation study  Please time orders for your patient's next blood draw.      Please do not hesitate to page with questions.  Karen Nguyen MD  Hematology-oncology PGY4   [phone] Hematology- Oncology On Call    46 year old man with h/o Guillain barre, CVA, bedbound and no movement of lower extremities, and Left renal subcapsular hematoma 9/2017, who presented with right sided pain in the lower abdomen, flank and back x 2 days found to have large R subcapsular hematoma with RP hemorrhage. Hematology-oncology called regarding concern for possible bleeding diathesis. Patient has had major bleeding, require 4 units PRBC.     Labs, personally reviewed by me.     LABS:                        7.7    11.09 )-----------( 169      ( 29 May 2022 17:04 )             23.3     Hgb Trend: 7.7<--, 7.9<--, 9.9<--, 11.3<--, 12.9<--  05-29    131<L>  |  97<L>  |  31<H>  ----------------------------<  100<H>  4.7   |  22  |  2.07<H>    Ca    7.8<L>      29 May 2022 17:04  Phos  3.5     05-29  Mg     1.80     05-29      Creatinine Trend: 2.07<--, 2.37<--, 2.51<--, 2.14<--, 2.07<--  PT/INR - ( 29 May 2022 00:45 )   PT: 13.3 sec;   INR: 1.14 ratio         PTT - ( 29 May 2022 00:45 )  PTT:27.0 sec  Urinalysis Basic - ( 28 May 2022 08:00 )    Color: Yellow / Appearance: Slightly Turbid / SG: >1.050 / pH: x  Gluc: x / Ketone: Negative  / Bili: Negative / Urobili: <2 mg/dL   Blood: x / Protein: 30 mg/dL / Nitrite: Negative   Leuk Esterase: Small / RBC: 25 - 30 /HPF / WBC 15 - 20 /HPF   Sq Epi: x / Non Sq Epi: 5 - 10 /HPF / Bacteria: Few    Arterial Blood Gas:  05-28 @ 01:20  7.39/41/93/25/98.6/-0.2  ABG lactate: --    -This patient has normal PT, PTT, platelet count.   -Although patient has elevated SCr, he is not uremic.  -His liver enzymes appear mostly normal.   -He is not currently on antiplatelet agents.    The patient's history as noted in chart indicates he has history of CVA and L renal capsular hematoma. CVA history is unclear. There does not seem to be other indicators of history of major bleeding.     - Please check fibrinogen and D-dimer level. Give cryoprecipitate if patient is actively bleeding to maintain fibrinogen level above 100mg/dl.   - Check plasma VWF antigen, VWF activity (ristocetin cofactor and collagen binding), and plasma factor VIII activity.   - Check platelet aggregation study  Please time orders for your patient's next blood draw.    Of note-   According to [employer], with regards to use of recombinant factor VIIa use, it can be considered for patients who are experiencing life-threatening bleeding or are likely to experience life-threatening perioperaitve bleeding if not treated, and for whom hemorrhage has not responded to transfusion, OR if there is an underlying bleeding condition with no other therapy available. However, It also has a higher risk of thromboembolic complications and black box warning.       Please do not hesitate to page with questions.  Karen Nguyen MD  Hematology-oncology PGY4   [phone] Hematology- Oncology On Call    46 year old man with h/o Guillain barre, CVA, bedbound and no movement of lower extremities, and Left renal subcapsular hematoma 9/2017, who presented with right sided pain in the lower abdomen, flank and back x 2 days found to have large R subcapsular hematoma with RP hemorrhage. Hematology-oncology called regarding concern for possible bleeding diathesis. Patient has had major bleeding, require 4 units PRBC.     Labs, personally reviewed by me.     LABS:                        7.7    11.09 )-----------( 169      ( 29 May 2022 17:04 )             23.3     Hgb Trend: 7.7<--, 7.9<--, 9.9<--, 11.3<--, 12.9<--  05-29    131<L>  |  97<L>  |  31<H>  ----------------------------<  100<H>  4.7   |  22  |  2.07<H>    Ca    7.8<L>      29 May 2022 17:04  Phos  3.5     05-29  Mg     1.80     05-29      Creatinine Trend: 2.07<--, 2.37<--, 2.51<--, 2.14<--, 2.07<--  PT/INR - ( 29 May 2022 00:45 )   PT: 13.3 sec;   INR: 1.14 ratio         PTT - ( 29 May 2022 00:45 )  PTT:27.0 sec  Urinalysis Basic - ( 28 May 2022 08:00 )    Color: Yellow / Appearance: Slightly Turbid / SG: >1.050 / pH: x  Gluc: x / Ketone: Negative  / Bili: Negative / Urobili: <2 mg/dL   Blood: x / Protein: 30 mg/dL / Nitrite: Negative   Leuk Esterase: Small / RBC: 25 - 30 /HPF / WBC 15 - 20 /HPF   Sq Epi: x / Non Sq Epi: 5 - 10 /HPF / Bacteria: Few    Arterial Blood Gas:  05-28 @ 01:20  7.39/41/93/25/98.6/-0.2  ABG lactate: --    -This patient has normal PT, PTT, platelet count.   -Although patient has elevated SCr, he is not uremic.  -His liver enzymes appear mostly normal.   -He is not currently on antiplatelet agents.    The patient's history as noted in chart indicates he has history of CVA and L renal capsular hematoma. CVA history is unclear. There does not seem to be other indicators of history of major bleeding.     - Please check fibrinogen and D-dimer level. Give cryoprecipitate if patient is actively bleeding to maintain fibrinogen level above 100mg/dl.   - Check plasma VWF antigen, VWF activity (ristocetin cofactor and collagen binding), and plasma factor VIII activity.   - Check platelet aggregation study  Please time orders for your patient's next blood draw.    Of note-   According to [employer], with regards to use of recombinant factor VIIa use, it can be considered for patients who are experiencing life-threatening bleeding or are likely to experience life-threatening perioperaitve bleeding if not treated, and for whom hemorrhage has not responded to transfusion, OR if there is an underlying bleeding condition with no other therapy available. However, It also has a higher risk of thromboembolic complications and black box warning. Risks may exceed benefits unless we have clear evidence of bleeding disorder.      Please do not hesitate to page with questions.  Karen Nguyen MD  Hematology-oncology PGY4   [phone]

## 2022-05-30 LAB
ANION GAP SERPL CALC-SCNC: 12 MMOL/L — SIGNIFICANT CHANGE UP (ref 7–14)
APTT BLD: 27.5 SEC — SIGNIFICANT CHANGE UP (ref 27–36.3)
BUN SERPL-MCNC: 31 MG/DL — HIGH (ref 7–23)
CALCIUM SERPL-MCNC: 8.4 MG/DL — SIGNIFICANT CHANGE UP (ref 8.4–10.5)
CHLORIDE SERPL-SCNC: 99 MMOL/L — SIGNIFICANT CHANGE UP (ref 98–107)
CO2 SERPL-SCNC: 20 MMOL/L — LOW (ref 22–31)
CREAT SERPL-MCNC: 1.9 MG/DL — HIGH (ref 0.5–1.3)
D DIMER BLD IA.RAPID-MCNC: 2121 NG/ML DDU — HIGH
EGFR: 44 ML/MIN/1.73M2 — LOW
FIBRINOGEN PPP-MCNC: 697 MG/DL — HIGH (ref 330–520)
GLUCOSE SERPL-MCNC: 104 MG/DL — HIGH (ref 70–99)
HCT VFR BLD CALC: 27.5 % — LOW (ref 39–50)
HCT VFR BLD CALC: 28.7 % — LOW (ref 39–50)
HGB BLD-MCNC: 8.9 G/DL — LOW (ref 13–17)
HGB BLD-MCNC: 9.3 G/DL — LOW (ref 13–17)
INR BLD: 1.12 RATIO — SIGNIFICANT CHANGE UP (ref 0.88–1.16)
MAGNESIUM SERPL-MCNC: 2.2 MG/DL — SIGNIFICANT CHANGE UP (ref 1.6–2.6)
MCHC RBC-ENTMCNC: 28.9 PG — SIGNIFICANT CHANGE UP (ref 27–34)
MCHC RBC-ENTMCNC: 29.2 PG — SIGNIFICANT CHANGE UP (ref 27–34)
MCHC RBC-ENTMCNC: 32.4 GM/DL — SIGNIFICANT CHANGE UP (ref 32–36)
MCHC RBC-ENTMCNC: 32.4 GM/DL — SIGNIFICANT CHANGE UP (ref 32–36)
MCV RBC AUTO: 89.3 FL — SIGNIFICANT CHANGE UP (ref 80–100)
MCV RBC AUTO: 90.3 FL — SIGNIFICANT CHANGE UP (ref 80–100)
NRBC # BLD: 0 /100 WBCS — SIGNIFICANT CHANGE UP
NRBC # BLD: 0 /100 WBCS — SIGNIFICANT CHANGE UP
NRBC # FLD: 0 K/UL — SIGNIFICANT CHANGE UP
NRBC # FLD: 0 K/UL — SIGNIFICANT CHANGE UP
PHOSPHATE SERPL-MCNC: 3.2 MG/DL — SIGNIFICANT CHANGE UP (ref 2.5–4.5)
PLATELET # BLD AUTO: 192 K/UL — SIGNIFICANT CHANGE UP (ref 150–400)
PLATELET # BLD AUTO: 201 K/UL — SIGNIFICANT CHANGE UP (ref 150–400)
POTASSIUM SERPL-MCNC: 4.7 MMOL/L — SIGNIFICANT CHANGE UP (ref 3.5–5.3)
POTASSIUM SERPL-SCNC: 4.7 MMOL/L — SIGNIFICANT CHANGE UP (ref 3.5–5.3)
PROTHROM AB SERPL-ACNC: 13 SEC — SIGNIFICANT CHANGE UP (ref 10.5–13.4)
RBC # BLD: 3.08 M/UL — LOW (ref 4.2–5.8)
RBC # BLD: 3.18 M/UL — LOW (ref 4.2–5.8)
RBC # FLD: 15 % — HIGH (ref 10.3–14.5)
RBC # FLD: 15.1 % — HIGH (ref 10.3–14.5)
SODIUM SERPL-SCNC: 131 MMOL/L — LOW (ref 135–145)
WBC # BLD: 12.07 K/UL — HIGH (ref 3.8–10.5)
WBC # BLD: 12.73 K/UL — HIGH (ref 3.8–10.5)
WBC # FLD AUTO: 12.07 K/UL — HIGH (ref 3.8–10.5)
WBC # FLD AUTO: 12.73 K/UL — HIGH (ref 3.8–10.5)

## 2022-05-30 PROCEDURE — 99291 CRITICAL CARE FIRST HOUR: CPT

## 2022-05-30 PROCEDURE — 99231 SBSQ HOSP IP/OBS SF/LOW 25: CPT

## 2022-05-30 PROCEDURE — 74019 RADEX ABDOMEN 2 VIEWS: CPT | Mod: 26

## 2022-05-30 RX ORDER — CARVEDILOL PHOSPHATE 80 MG/1
6.25 CAPSULE, EXTENDED RELEASE ORAL EVERY 12 HOURS
Refills: 0 | Status: DISCONTINUED | OUTPATIENT
Start: 2022-05-30 | End: 2022-06-03

## 2022-05-30 RX ORDER — SENNA PLUS 8.6 MG/1
2 TABLET ORAL AT BEDTIME
Refills: 0 | Status: DISCONTINUED | OUTPATIENT
Start: 2022-05-30 | End: 2022-06-03

## 2022-05-30 RX ORDER — POLYETHYLENE GLYCOL 3350 17 G/17G
17 POWDER, FOR SOLUTION ORAL DAILY
Refills: 0 | Status: DISCONTINUED | OUTPATIENT
Start: 2022-05-30 | End: 2022-06-03

## 2022-05-30 RX ORDER — HEPARIN SODIUM 5000 [USP'U]/ML
5000 INJECTION INTRAVENOUS; SUBCUTANEOUS EVERY 8 HOURS
Refills: 0 | Status: DISCONTINUED | OUTPATIENT
Start: 2022-05-30 | End: 2022-06-03

## 2022-05-30 RX ADMIN — Medication 975 MILLIGRAM(S): at 23:09

## 2022-05-30 RX ADMIN — SODIUM CHLORIDE 1 GRAM(S): 9 INJECTION INTRAMUSCULAR; INTRAVENOUS; SUBCUTANEOUS at 05:00

## 2022-05-30 RX ADMIN — Medication 975 MILLIGRAM(S): at 12:30

## 2022-05-30 RX ADMIN — Medication 975 MILLIGRAM(S): at 05:00

## 2022-05-30 RX ADMIN — Medication 5 MILLIGRAM(S): at 22:55

## 2022-05-30 RX ADMIN — SENNA PLUS 2 TABLET(S): 8.6 TABLET ORAL at 22:55

## 2022-05-30 RX ADMIN — CARVEDILOL PHOSPHATE 6.25 MILLIGRAM(S): 80 CAPSULE, EXTENDED RELEASE ORAL at 17:09

## 2022-05-30 RX ADMIN — SODIUM CHLORIDE 100 MILLILITER(S): 9 INJECTION, SOLUTION INTRAVENOUS at 05:00

## 2022-05-30 RX ADMIN — SODIUM CHLORIDE 1 GRAM(S): 9 INJECTION INTRAMUSCULAR; INTRAVENOUS; SUBCUTANEOUS at 17:10

## 2022-05-30 RX ADMIN — Medication 975 MILLIGRAM(S): at 12:25

## 2022-05-30 RX ADMIN — Medication 975 MILLIGRAM(S): at 17:30

## 2022-05-30 RX ADMIN — HEPARIN SODIUM 5000 UNIT(S): 5000 INJECTION INTRAVENOUS; SUBCUTANEOUS at 22:56

## 2022-05-30 RX ADMIN — ATORVASTATIN CALCIUM 40 MILLIGRAM(S): 80 TABLET, FILM COATED ORAL at 22:55

## 2022-05-30 RX ADMIN — Medication 975 MILLIGRAM(S): at 00:24

## 2022-05-30 RX ADMIN — Medication 975 MILLIGRAM(S): at 00:00

## 2022-05-30 RX ADMIN — PANTOPRAZOLE SODIUM 40 MILLIGRAM(S): 20 TABLET, DELAYED RELEASE ORAL at 05:00

## 2022-05-30 NOTE — DIETITIAN INITIAL EVALUATION ADULT - PERTINENT LABORATORY DATA
05-30    131<L>  |  99  |  31<H>  ----------------------------<  104<H>  4.7   |  20<L>  |  1.90<H>    Ca    8.4      30 May 2022 03:50  Phos  3.2     05-30  Mg     2.20     05-30

## 2022-05-30 NOTE — PROGRESS NOTE ADULT - ASSESSMENT
46y Male pmh CVA, R Subcapsular hematoma, Guillan barre (non-ambulatory) presenting initially to ED for 2 days of worsening RLQ pain with radiation to R back a/w progressive abd distention. Has never had similar pain before, sharp, constant and progressive. Severe, limiting PO intake. No fevers/chills, n/v, change in bowel or urinary habits. During stay in ED pt underwent CT abd/pelvis which demonstrated large right anterior pararenal space hematoma with retroperitoneal hemorrhage tracking into the right lower abdomen . Had drop in H/H from 11.2/34.8 -> 10.4/32.1. Transfused one unit pRBCs.  Never hypotensive with maps in 70s however tachy to low 100s with systolics  75-96. Currently mentating well, appears well perfused.     PLAN:   Neurologic:   - hx CVA, Guillain barre, non-ambulatory at baseline  - no acute issues, AAOx3 mentating well  - Pain meds: IV Tylenol PRN, oxycodone PRN     Respiratory:   - no acute issues     Cardiovascular: HTN, HLD  - Hemorrhagic shock s/p 4u PRBC 1u Plts 1u FFP  - blood pressure currently stable   - Home BP meds currently on hold (lisinopril 2.5mg daily, carvedilol 6.25 mg BID)     Gastrointestinal/Nutrition:   - Regular diet--made NPO for possible IR intervention   - Protonix for GI ppx    Renal/Genitourinary:   - Subcapsular R renal hematoma with RP bleed  - IR consulted -recommending CTAngio to localize bleeding; CTAngio ordered   - continue menjivar (difficult placement in ED)   - 1L NS given for low UOP with improvement   - Urology: may consider OR if H&H continues to downtrend    Hematologic:   - s/p 4u pRBC, 1u FFP, 1u platelets  - CBC downtrending today but stable; monitor Q12  - 5/29/22, 1u PRBC given, post transfusion CBC pending   - will hold DVT ppx   - f/u heme onc to discuss benefit of offering factor 7    Infectious Disease:   - Afebrile  - Monitor off antibiotics    Lines/Tubes:  - PIV x3, L Radial A-line    Endocrine:   - no active issues     Disposition: SICU   46y Male pmh CVA, R Subcapsular hematoma, Guillan barre (non-ambulatory) presenting initially to ED for 2 days of worsening RLQ pain with radiation to R back a/w progressive abd distention. Has never had similar pain before, sharp, constant and progressive. Severe, limiting PO intake. No fevers/chills, n/v, change in bowel or urinary habits. During stay in ED pt underwent CT abd/pelvis which demonstrated large right anterior pararenal space hematoma with retroperitoneal hemorrhage tracking into the right lower abdomen . Had drop in H/H from 11.2/34.8 -> 10.4/32.1. Transfused one unit pRBCs.  Never hypotensive with maps in 70s however tachy to low 100s with systolics  75-96. Currently mentating well, appears well perfused.     PLAN:   Neurologic:   - hx CVA, Guillain barre, non-ambulatory at baseline  - no acute issues, AAOx3 mentating well  - Pain meds: Tylenol PRN, oxycodone PRN     Respiratory:   - no acute issues     Cardiovascular: HTN, HLD  - Hemorrhagic shock s/p total 4u PRBC 1u Plts 1u FFP  - 1 additional PRBC given 5/29  - Tachycardia improving  - Home BP meds currently on hold (lisinopril 2.5mg daily, carvedilol 6.25 mg BID)     Gastrointestinal/Nutrition:   -Regular Diet    - Protonix for GI ppx    Renal/Genitourinary:   - Subcapsular R renal hematoma with RP bleed. CTA negative for active bleed.  - improvement in UOP and Cr with fluids; will continue to monitor Cr   - Continue menjivar (difficult placement in ED)   - Urology- h/h remain stable s/p additional 1 unit PRBC on 5/29--> No OR at this time     Hematologic:   - s/p 4u pRBC, 1u FFP, 1u platelets  - 5/29/22, 1u PRBC given, post transfusion CBC stable   - Holding chemical DVT ppx --will restart this afternoon if CBC stable   - Appreciate heme/onc recs - coagulopathy studies ordered; requesting Platelet aggregation study however only done in lab on Tues-Thur; plan to send labs tomorrow 5/31    Infectious Disease:   - Afebrile  - Monitor off antibiotics    Lines/Tubes:  - PIV x3    Endocrine:   - no active issues     Disposition: SICU

## 2022-05-30 NOTE — PROGRESS NOTE ADULT - SUBJECTIVE AND OBJECTIVE BOX
SICU AM PROGRESS NOTE  ===============================    HPI: 46 male with h/o Guillain barre, CVA, bedbound and no movement of lower extremities, and Left renal subcapsular hematoma 9/2017, presenting to the ED with right sided pain in the lower abdomen, flank and back x 2 days.  Complains of nausea with emesis.  Reports little to no urge to void over the past 24 hours.  Home attendant at the bedside confirms patient has been oliguric over the past 24 hours.  Denies hematuria or dysuria.  Denies fevers.  Initial presentation, patient is hypotensive with BP in the 90s with a normal heart rate.  Hgb 11.2, HCt 34.8.  CT performed showing a right renal subcapsular hematoma.  Patient became tachycardic, and more hypotensive with SBP in the 70s.  Repeat HGB 10.4 HCT 32.1. Patient requiring blood transfusions, and admission to the SICU.  Patient had a left renal subcapsular hematoma 9/2017, requiring transfusions at that time, and ultimately IR intervention without findings of active bleeding.  Patient recently had a toenail removed, and reports excessive bleeding post procedure requiring Surgicel and pressure dressing.  He denies other episodes of poor clotting, bleeding, or easy bruising. He only takes ASA 81mg, denies other forms of anticoagulation/antiplatelet.     Interval Events:   -IR consulted for right anterior pararenal hematoma and RP bleed; recommending CTA   -CTA with Large right subcapsular hematoma with adjacent pararenal space retroperitoneal hemorrhage are stable to minimally increased in size; no active bleeding   -low UOP-->1L bolus of NS with improvement  -repeat h/h in afternoon 9.9/28.9 to 7.9/24.1 to 7.7/23.3  -1u PRBC given, post transfusion CBC pending  -Urology: may consider OR if H&H continue to downtrend     MEDICATIONS  (STANDING):  acetaminophen     Tablet .. 975 milliGRAM(s) Oral every 6 hours  atorvastatin 40 milliGRAM(s) Oral at bedtime  lactated ringers. 1000 milliLiter(s) (100 mL/Hr) IV Continuous <Continuous>  pantoprazole    Tablet 40 milliGRAM(s) Oral before breakfast  sodium chloride 1 Gram(s) Oral every 12 hours    MEDICATIONS  (PRN):  oxyCODONE    IR 5 milliGRAM(s) Oral every 4 hours PRN Moderate Pain (4 - 6)  oxyCODONE    IR 10 milliGRAM(s) Oral every 4 hours PRN Severe Pain (7 - 10)    ICU Vital Signs Last 24 Hrs  T(C): 37.2 (30 May 2022 00:00), Max: 37.2 (30 May 2022 00:00)  T(F): 99 (30 May 2022 00:00), Max: 99 (30 May 2022 00:00)  HR: 115 (30 May 2022 01:00) (92 - 117)  BP: 124/72 (30 May 2022 01:00) (93/62 - 132/84)  BP(mean): 88 (30 May 2022 01:00) (72 - 102)  ABP: --  ABP(mean): --  RR: 23 (30 May 2022 01:00) (14 - 27)  SpO2: 100% (30 May 2022 01:00) (93% - 100%)      I&O's Detail    28 May 2022 07:01  -  29 May 2022 07:00  --------------------------------------------------------  IN:    IV PiggyBack: 475 mL    Lactated Ringers: 2400 mL    Oral Fluid: 360 mL    Plasma: 300 mL  Total IN: 3535 mL    OUT:    Indwelling Catheter - Urethral (mL): 1195 mL  Total OUT: 1195 mL    Total NET: 2340 mL      29 May 2022 07:01  -  30 May 2022 01:39  --------------------------------------------------------  IN:    IV PiggyBack: 100 mL    Lactated Ringers: 1900 mL    Oral Fluid: 200 mL    PRBCs (Packed Red Blood Cells): 300 mL    Sodium Chloride 0.9% Bolus: 1000 mL  Total IN: 3500 mL    OUT:    Indwelling Catheter - Urethral (mL): 1590 mL  Total OUT: 1590 mL    Total NET: 1910 mL        --------------------------------------------------------------------------------------    EXAM  NEUROLOGY  Exam: Normal, NAD, alert, oriented x3, no focal deficits. PERRLA.       RESPIRATORY  Exam: Lungs clear to auscultation, Normal expansion/effort.    CARDIOVASCULAR  Exam: S1, S2.  Regular rate and rhythm     GI/NUTRITION  Exam: Abdomen soft, Non-tender, moderately distended  Current Diet:  NPO    METABOLIC/FLUIDS/ELECTROLYTES  lactated ringers. 1000 milliLiter(s) IV Continuous <Continuous>      HEMATOLOGIC  [x] DVT Prophylaxis:   Transfusions:	[] PRBC	[] Platelets		[] FFP	[] Cryoprecipitate    INFECTIOUS DISEASE  Antimicrobials/Immunologic Medications:      VASCULAR  Exam: Extremities warm, pink, well-perfused.      MUSCULOSKELETAL  Exam: All extremities moving spontaneously without limitations.      SKIN  Exam: Good skin turgor, no skin breakdown.     LABS  --------------------------------------------------------------------------------------  CBC (05-29 @ 17:04)                              7.7<L>                         11.09<H>  )----------------(  169        --    % Neutrophils, --    % Lymphocytes, ANC: --                                  23.3<L>  CBC (05-29 @ 08:51)                              7.9<L>                         11.62<H>  )----------------(  160        --    % Neutrophils, --    % Lymphocytes, ANC: --                                  24.1<L>    BMP (05-29 @ 17:04)             131<L>  |  97<L>   |  31<H> 		Ca++ --      Ca 7.8<L>             ---------------------------------( 100<H>		Mg 1.80               4.7     |  22      |  2.07<H>			Ph 3.5     BMP (05-29 @ 00:45)             128<L>  |  94<L>   |  31<H> 		Ca++ --      Ca 8.0<L>             ---------------------------------( 102<H>		Mg 2.10               4.1     |  24      |  2.37<H>			Ph 3.7         Coags (05-29 @ 00:45)  aPTT 27.0 / INR 1.14 / PT 13.3          --------------------------------------------------------------------------------------

## 2022-05-30 NOTE — PROGRESS NOTE ADULT - ASSESSMENT
47 yo M h/o Guillain barre, CVA, bedbound and no movement of lower extremities, and Left renal subcapsular hematoma 9/2017, presented to the ED 5/27/2022 with right sided pain in the lower abdomen, flank and back x 2 days, N/V.  Reports little to no urge to void over the past 24 hours.  Home attendant at the bedside confirms patient has been oliguric over the past 24 hours.  Denies hematuria or dysuria.  Denies fevers. Patient was hypotensive with BP in the 90s with a normal heart rate.  Hgb 11.2, HCt 34.8.  CT performed showing a right renal subcapsular hematoma.  Patient became tachycardic, and more hypotensive with SBP in the 70s.  Repeat HGB 10.4 HCT 32.1. Patient transfused and admitted to SICU.  Patient had a left renal subcapsular hematoma 9/2017, requiring transfusions at that time, and ultimately IR intervention without findings of active bleeding.  Patient recently had a toenail removed, and reports excessive bleeding post procedure requiring Surgicel and pressure dressing.  He denies other episodes of poor clotting, bleeding, or easy bruising. He only takes ASA 81mg, denies other forms of anticoagulation/antiplatelet.     5/27: admitted to SICU for hemodynamic monitoring in setting of large R subcapsular hematoma, transfused 3u pRBC  5/28: transfused 1u pRBC, 1 FFP, 1 platelets; heme-onc consulted, recommend chest CT  5/29: Hct continues to downtrending, IR consult pending, CTA no active extrav, IR with no intervention plans, 1u PRBC  5/30: Doing well, pain controlled    Plan:  -monitor UO  -monitor HCT; continues to downtrend  -monitor abdominal exam  -appreciate heme consult --> recommend labs, pending/in lab  -pain control  -continue menjivar  -NPO  -appreciate SICU care

## 2022-05-30 NOTE — DIETITIAN INITIAL EVALUATION ADULT - FUNCTIONAL SCREEN CURRENT LEVEL: SWALLOWING (IF SCORE 2 OR MORE FOR ANY ITEM, CONSULT REHAB SERVICES), MLM)
0 = swallows foods/liquids without difficulty Mucosal Advancement Flap Text: Given the location of the defect, shape of the defect and the proximity to free margins a mucosal advancement flap was deemed most appropriate. Incisions were made with a 15 blade scalpel in the appropriate fashion along the cutaneous vermillion border and the mucosal lip. The remaining actinically damaged mucosal tissue was excised.  The mucosal advancement flap was then elevated to the gingival sulcus with care taken to preserve the neurovascular structures and advanced into the primary defect. Care was taken to ensure that precise realignment of the vermillion border was achieved.

## 2022-05-30 NOTE — DIETITIAN INITIAL EVALUATION ADULT - ADD RECOMMEND
1. Monitor weights, labs, BM's, skin integrity, p.o. intake and edema. 2. Follow pt as per protocol.

## 2022-05-30 NOTE — DIETITIAN INITIAL EVALUATION ADULT - NSICDXPASTMEDICALHX_GEN_ALL_CORE_FT
PAST MEDICAL HISTORY:  CVA (cerebral vascular accident)     Guillain-Baud[place] syndrome     HLD (hyperlipidemia)     HTN (hypertension)     Renal hematoma left, 2017, s/p IR angio with no sign of bleed

## 2022-05-30 NOTE — CHART NOTE - NSCHARTNOTEFT_GEN_A_CORE
Hematology/Oncology on call note:     Urology called concerned about workup for possible coagulopathy. Chart reviewed. Hematology fellow last night also left recommendations in chart note and these have been reviewed. Patient has subcapsular bleed- H/H is being trended and is currently stable. Patient needed 1 unit pRBCs in the past 24 hours. Per chart review, no major h/o bleed.    # Subcapsular bleed; r/o bleeding diathesis   - This patient has normal PT, PTT, platelet count. Normal PT/PTT basically assures us that intrinsic and extrinsic pathways do NOT have a deficiency or inhibitor- that means Factors XII, XI, IX, VIII, VII, X, II and I are not deficient or inhibited   - Although patient has elevated SCr, he is not significantly uremic enough to cause platelet dysfunction   - His liver enzymes appear mostly normal.   - He is not currently on antiplatelet agents.  - Although D-dimer is elevated-- Fibrinogen is high. This is in the setting of getting cryo on 5/27. Fibrinogen in cryo has a half life of ~100 hours so this could affect the results.   - Check DAILY Fibrinogen level. If <200, consider giving more cryo.   - VWF Ag and activity ordered and pending  - For a completely thorough workup of bleeding diathesis-- can order platelet aggregation studies and Factor XIII activity and level.       Dragan Carbajal, PGY-5  Hematology-Oncology Fellow  [phone] ([place]) [id number] ([facility]  I can also be reached on [employer] Teams  Please page fellow on call after 5pm and on weekends

## 2022-05-30 NOTE — PROGRESS NOTE ADULT - SUBJECTIVE AND OBJECTIVE BOX
INTERVAL HPI/OVERNIGHT EVENTS:  No acute events overnight. Passing gas, pain well controlled, continues to be Tachy to 112. Feeling comfortable.     VITALS:    T(F): 98.7 (05-30-22 @ 04:00), Max: 99 (05-30-22 @ 00:00)  HR: 111 (05-30-22 @ 06:00) (92 - 117)  BP: 121/84 (05-30-22 @ 06:00) (95/67 - 133/93)  RR: 18 (05-30-22 @ 06:00) (14 - 26)  SpO2: 100% (05-30-22 @ 06:00) (93% - 100%)  Wt(kg): --    I&O's Detail    29 May 2022 07:01  -  30 May 2022 07:00  --------------------------------------------------------  IN:    IV PiggyBack: 100 mL    Lactated Ringers: 2400 mL    Oral Fluid: 200 mL    PRBCs (Packed Red Blood Cells): 300 mL    Sodium Chloride 0.9% Bolus: 1000 mL  Total IN: 4000 mL    OUT:    Indwelling Catheter - Urethral (mL): 2105 mL  Total OUT: 2105 mL    Total NET: 1895 mL          MEDICATIONS:    ANTIBIOTICS:      PAIN CONTROL:  acetaminophen     Tablet .. 975 milliGRAM(s) Oral every 6 hours  oxyCODONE    IR 5 milliGRAM(s) Oral every 4 hours PRN  oxyCODONE    IR 10 milliGRAM(s) Oral every 4 hours PRN       MEDS:      HEME/ONC        PHYSICAL EXAM:  General: No acute distress.  Alert and Oriented  Abdominal Exam:   Exam:      LABS:                        9.3    12.07 )-----------( 192      ( 30 May 2022 03:50 )             28.7     05-30    131<L>  |  99  |  31<H>  ----------------------------<  104<H>  4.7   |  20<L>  |  1.90<H>    Ca    8.4      30 May 2022 03:50  Phos  3.2     05-30  Mg     2.20     05-30      PT/INR - ( 30 May 2022 03:50 )   PT: 13.0 sec;   INR: 1.12 ratio         PTT - ( 30 May 2022 03:50 )  PTT:27.5 sec  Urinalysis Basic - ( 28 May 2022 08:00 )    Color: Yellow / Appearance: Slightly Turbid / SG: >1.050 / pH: x  Gluc: x / Ketone: Negative  / Bili: Negative / Urobili: <2 mg/dL   Blood: x / Protein: 30 mg/dL / Nitrite: Negative   Leuk Esterase: Small / RBC: 25 - 30 /HPF / WBC 15 - 20 /HPF   Sq Epi: x / Non Sq Epi: 5 - 10 /HPF / Bacteria: Few        RADIOLOGY & ADDITIONAL TESTS:    ASSESSMENT: 46yMale s/p     PLAN:  Diet: clears  Pain control  Monitor Urine Output  DVT ppx  Cont Abx  OOB/IS   INTERVAL HPI/OVERNIGHT EVENTS:  No acute events overnight. Passing gas, pain well controlled, continues to be Tachy to 112. Feeling comfortable.     VITALS:    T(F): 98.7 (05-30-22 @ 04:00), Max: 99 (05-30-22 @ 00:00)  HR: 111 (05-30-22 @ 06:00) (92 - 117)  BP: 121/84 (05-30-22 @ 06:00) (95/67 - 133/93)  RR: 18 (05-30-22 @ 06:00) (14 - 26)  SpO2: 100% (05-30-22 @ 06:00) (93% - 100%)  Wt(kg): --    I&O's Detail    29 May 2022 07:01  -  30 May 2022 07:00  --------------------------------------------------------  IN:    IV PiggyBack: 100 mL    Lactated Ringers: 2400 mL    Oral Fluid: 200 mL    PRBCs (Packed Red Blood Cells): 300 mL    Sodium Chloride 0.9% Bolus: 1000 mL  Total IN: 4000 mL    OUT:    Indwelling Catheter - Urethral (mL): 2105 mL  Total OUT: 2105 mL    Total NET: 1895 mL          MEDICATIONS:    ANTIBIOTICS:      PAIN CONTROL:  acetaminophen     Tablet .. 975 milliGRAM(s) Oral every 6 hours  oxyCODONE    IR 5 milliGRAM(s) Oral every 4 hours PRN  oxyCODONE    IR 10 milliGRAM(s) Oral every 4 hours PRN       MEDS:      HEME/ONC        PHYSICAL EXAM:  General: No acute distress.  Alert and Oriented  Abdominal Exam: Soft, NT, ND   Exam: Park in place      LABS:                        9.3    12.07 )-----------( 192      ( 30 May 2022 03:50 )             28.7     05-30    131<L>  |  99  |  31<H>  ----------------------------<  104<H>  4.7   |  20<L>  |  1.90<H>    Ca    8.4      30 May 2022 03:50  Phos  3.2     05-30  Mg     2.20     05-30      PT/INR - ( 30 May 2022 03:50 )   PT: 13.0 sec;   INR: 1.12 ratio         PTT - ( 30 May 2022 03:50 )  PTT:27.5 sec  Urinalysis Basic - ( 28 May 2022 08:00 )    Color: Yellow / Appearance: Slightly Turbid / SG: >1.050 / pH: x  Gluc: x / Ketone: Negative  / Bili: Negative / Urobili: <2 mg/dL   Blood: x / Protein: 30 mg/dL / Nitrite: Negative   Leuk Esterase: Small / RBC: 25 - 30 /HPF / WBC 15 - 20 /HPF   Sq Epi: x / Non Sq Epi: 5 - 10 /HPF / Bacteria: Few        RADIOLOGY & ADDITIONAL TESTS:    ASSESSMENT: 46yMale s/p     PLAN:  Diet: clears  Pain control  Monitor Urine Output  DVT ppx  Cont Abx  OOB/IS

## 2022-05-30 NOTE — DIETITIAN INITIAL EVALUATION ADULT - PERTINENT MEDS FT
MEDICATIONS  (STANDING):  acetaminophen     Tablet .. 975 milliGRAM(s) Oral every 6 hours  atorvastatin 40 milliGRAM(s) Oral at bedtime  lactated ringers. 1000 milliLiter(s) (100 mL/Hr) IV Continuous <Continuous>  pantoprazole    Tablet 40 milliGRAM(s) Oral before breakfast  sodium chloride 1 Gram(s) Oral every 12 hours    MEDICATIONS  (PRN):  oxyCODONE    IR 5 milliGRAM(s) Oral every 4 hours PRN Moderate Pain (4 - 6)  oxyCODONE    IR 10 milliGRAM(s) Oral every 4 hours PRN Severe Pain (7 - 10)

## 2022-05-31 LAB
AA SERPL-MCNC: 75 % — SIGNIFICANT CHANGE UP (ref 55–100)
ADP 20UM ATP RELEASE: 0 NM — LOW
ADP 20UM%: 69 % — SIGNIFICANT CHANGE UP (ref 60–100)
ADP 5UM ATP RELEASE: 0 NM — LOW
ADP 5UM%: 69 % — SIGNIFICANT CHANGE UP (ref 50–95)
ANION GAP SERPL CALC-SCNC: 9 MMOL/L — SIGNIFICANT CHANGE UP (ref 7–14)
ARACHIDONIC ACID ATP RELEASE: 0.41 NM — SIGNIFICANT CHANGE UP
BUN SERPL-MCNC: 25 MG/DL — HIGH (ref 7–23)
CALCIUM SERPL-MCNC: 8.6 MG/DL — SIGNIFICANT CHANGE UP (ref 8.4–10.5)
CHLORIDE SERPL-SCNC: 101 MMOL/L — SIGNIFICANT CHANGE UP (ref 98–107)
CO2 SERPL-SCNC: 22 MMOL/L — SIGNIFICANT CHANGE UP (ref 22–31)
COLLAGEN AB SER-ACNC: 75 % — SIGNIFICANT CHANGE UP (ref 60–100)
COLLAGEN ATP RELEASE: 0.36 NM — LOW
CREAT SERPL-MCNC: 1.34 MG/DL — HIGH (ref 0.5–1.3)
EGFR: 66 ML/MIN/1.73M2 — SIGNIFICANT CHANGE UP
EPINEPH PLAS-MCNC: 77 % — SIGNIFICANT CHANGE UP (ref 40–100)
EPINEPHRINE ATP RELEASE: 0.31 NM — SIGNIFICANT CHANGE UP
FACT VIII ACT/NOR PPP: 123.7 % — SIGNIFICANT CHANGE UP (ref 45–125)
GLUCOSE SERPL-MCNC: 100 MG/DL — HIGH (ref 70–99)
HCT VFR BLD CALC: 26.1 % — LOW (ref 39–50)
HCT VFR BLD CALC: 26.3 % — LOW (ref 39–50)
HGB BLD-MCNC: 8.5 G/DL — LOW (ref 13–17)
HGB BLD-MCNC: 8.6 G/DL — LOW (ref 13–17)
MAGNESIUM SERPL-MCNC: 2.1 MG/DL — SIGNIFICANT CHANGE UP (ref 1.6–2.6)
MCHC RBC-ENTMCNC: 29 PG — SIGNIFICANT CHANGE UP (ref 27–34)
MCHC RBC-ENTMCNC: 29.5 PG — SIGNIFICANT CHANGE UP (ref 27–34)
MCHC RBC-ENTMCNC: 32.6 GM/DL — SIGNIFICANT CHANGE UP (ref 32–36)
MCHC RBC-ENTMCNC: 32.7 GM/DL — SIGNIFICANT CHANGE UP (ref 32–36)
MCV RBC AUTO: 89.1 FL — SIGNIFICANT CHANGE UP (ref 80–100)
MCV RBC AUTO: 90.1 FL — SIGNIFICANT CHANGE UP (ref 80–100)
NRBC # BLD: 0 /100 WBCS — SIGNIFICANT CHANGE UP
NRBC # BLD: 0 /100 WBCS — SIGNIFICANT CHANGE UP
NRBC # FLD: 0 K/UL — SIGNIFICANT CHANGE UP
NRBC # FLD: 0 K/UL — SIGNIFICANT CHANGE UP
PHOSPHATE SERPL-MCNC: 2.6 MG/DL — SIGNIFICANT CHANGE UP (ref 2.5–4.5)
PLATELET # BLD AUTO: 204 K/UL — SIGNIFICANT CHANGE UP (ref 150–400)
PLATELET # BLD AUTO: 211 K/UL — SIGNIFICANT CHANGE UP (ref 150–400)
PLATELET AGGREGATION INTERP: SIGNIFICANT CHANGE UP
POTASSIUM SERPL-MCNC: 4.7 MMOL/L — SIGNIFICANT CHANGE UP (ref 3.5–5.3)
POTASSIUM SERPL-SCNC: 4.7 MMOL/L — SIGNIFICANT CHANGE UP (ref 3.5–5.3)
RBC # BLD: 2.92 M/UL — LOW (ref 4.2–5.8)
RBC # BLD: 2.93 M/UL — LOW (ref 4.2–5.8)
RBC # FLD: 15.1 % — HIGH (ref 10.3–14.5)
RBC # FLD: 15.3 % — HIGH (ref 10.3–14.5)
RISTOCETIN 0.625 MG/ML: 0 % — SIGNIFICANT CHANGE UP (ref 0–0)
RISTOCETIN 1.25 MG/ML: 86 % — SIGNIFICANT CHANGE UP (ref 55–100)
SODIUM SERPL-SCNC: 132 MMOL/L — LOW (ref 135–145)
THROMBIN 1.0 U/ML: 0.51 NM — SIGNIFICANT CHANGE UP
VWF AG ACT/NOR PPP IA: 124 % — SIGNIFICANT CHANGE UP (ref 63–170)
VWF:RCO ACT/NOR PPP PL AGG: 145 % — HIGH (ref 43–126)
WBC # BLD: 12.36 K/UL — HIGH (ref 3.8–10.5)
WBC # BLD: 12.48 K/UL — HIGH (ref 3.8–10.5)
WBC # FLD AUTO: 12.36 K/UL — HIGH (ref 3.8–10.5)
WBC # FLD AUTO: 12.48 K/UL — HIGH (ref 3.8–10.5)

## 2022-05-31 PROCEDURE — 99292 CRITICAL CARE ADDL 30 MIN: CPT

## 2022-05-31 PROCEDURE — 85576 BLOOD PLATELET AGGREGATION: CPT | Mod: 26,QW

## 2022-05-31 PROCEDURE — 99291 CRITICAL CARE FIRST HOUR: CPT

## 2022-05-31 PROCEDURE — 99231 SBSQ HOSP IP/OBS SF/LOW 25: CPT

## 2022-05-31 RX ADMIN — Medication 5 MILLIGRAM(S): at 22:10

## 2022-05-31 RX ADMIN — CARVEDILOL PHOSPHATE 6.25 MILLIGRAM(S): 80 CAPSULE, EXTENDED RELEASE ORAL at 05:04

## 2022-05-31 RX ADMIN — SODIUM CHLORIDE 1 GRAM(S): 9 INJECTION INTRAMUSCULAR; INTRAVENOUS; SUBCUTANEOUS at 05:04

## 2022-05-31 RX ADMIN — ATORVASTATIN CALCIUM 40 MILLIGRAM(S): 80 TABLET, FILM COATED ORAL at 22:10

## 2022-05-31 RX ADMIN — SENNA PLUS 2 TABLET(S): 8.6 TABLET ORAL at 22:10

## 2022-05-31 RX ADMIN — Medication 975 MILLIGRAM(S): at 17:01

## 2022-05-31 RX ADMIN — Medication 975 MILLIGRAM(S): at 11:09

## 2022-05-31 RX ADMIN — HEPARIN SODIUM 5000 UNIT(S): 5000 INJECTION INTRAVENOUS; SUBCUTANEOUS at 22:10

## 2022-05-31 RX ADMIN — HEPARIN SODIUM 5000 UNIT(S): 5000 INJECTION INTRAVENOUS; SUBCUTANEOUS at 05:04

## 2022-05-31 RX ADMIN — PANTOPRAZOLE SODIUM 40 MILLIGRAM(S): 20 TABLET, DELAYED RELEASE ORAL at 05:04

## 2022-05-31 RX ADMIN — CARVEDILOL PHOSPHATE 6.25 MILLIGRAM(S): 80 CAPSULE, EXTENDED RELEASE ORAL at 17:00

## 2022-05-31 RX ADMIN — Medication 975 MILLIGRAM(S): at 18:05

## 2022-05-31 RX ADMIN — POLYETHYLENE GLYCOL 3350 17 GRAM(S): 17 POWDER, FOR SOLUTION ORAL at 11:09

## 2022-05-31 RX ADMIN — HEPARIN SODIUM 5000 UNIT(S): 5000 INJECTION INTRAVENOUS; SUBCUTANEOUS at 13:29

## 2022-05-31 RX ADMIN — SODIUM CHLORIDE 1 GRAM(S): 9 INJECTION INTRAMUSCULAR; INTRAVENOUS; SUBCUTANEOUS at 17:00

## 2022-05-31 RX ADMIN — Medication 975 MILLIGRAM(S): at 05:03

## 2022-05-31 RX ADMIN — Medication 10 MILLIGRAM(S): at 06:04

## 2022-05-31 RX ADMIN — Medication 975 MILLIGRAM(S): at 13:14

## 2022-05-31 NOTE — PROGRESS NOTE ADULT - SUBJECTIVE AND OBJECTIVE BOX
Subjective    Patient seen and examined. States he feels wonderful. Had a bowel movement this morning, reports first in 5 days. Also passing gas. NPO, denies nausea/vomiting. Denies pain.     Objective    Vital signs  T(F): , Max: 99.8 (05-30-22 @ 20:00)  HR: 106 (05-31-22 @ 09:00)  BP: 128/91 (05-31-22 @ 09:00)  SpO2: 93% (05-31-22 @ 09:00)  Wt(kg): --    Output     05-30 @ 07:01  -  05-31 @ 07:00  --------------------------------------------------------  IN: 2200 mL / OUT: 2260 mL / NET: -60 mL    05-31 @ 07:01  -  05-31 @ 09:52  --------------------------------------------------------  IN: 0 mL / OUT: 135 mL / NET: -135 mL        General: NAD  Abdomen: softly distended, nontender   : menjivar in place draining clear yellow urine, no CVA tenderness    Labs      05-31 @ 05:21    WBC 12.36 / Hct 26.1  / SCr 1.34     05-30 @ 14:30    WBC 12.73 / Hct 27.5  / SCr --         Urine: neg    Imaging: no new imaging for review

## 2022-05-31 NOTE — PROGRESS NOTE ADULT - ASSESSMENT
Patient is a 46 year old male with a PMHx of HTN, HLD, Guillain barre, CVA, bedbound (no movement of lower extremities) and left renal subcapsular hematoma (9/2017) who presented to the ED with right sided pain in the lower abdomen, flank and back x2 days.  CT Abdomen / Pelvis performed showing large right subcapsular renal hematoma.  Large right anterior pararenal space hematoma with retroperitoneal hemorrhage tracking into the right lower abdomen, pelvis and a right inguinal hernia.  Increased size of indeterminate 1.8 cm left interpolar renal lesion, previously demonstrating enhancement and measuring 1.3 cm, suspicious for neoplasm.  I R consulted, however no intervention performed.  CTA Abdomen / Pelvis performed showing large right subcapsular hematoma with adjacent pararenal space retroperitoneal hemorrhage are stable to minimally increased in size since 5/27/2022.  Significant streak artifact limits evaluation.  Within the limitations of this study, there is no evidence of active extravasation.  Increased small complex ascites, particularly in the right lower quadrant, in the interim since 5/27/2022.  Compression of the right renal parenchyma re-demonstrated.  Retained right collecting system contrast suggests ongoing right renal dysfunction.  1.8 cm indeterminate left renal lesion redemonstrated which can be further characterized with abdominal MRI when feasible.  Trace pleural effusions.  Bibasilar dependent atelectasis.  3 mm lingular nodule.      PLAN:    NEUROLOGY:  - Hx of Guillain [place] syndrome  - Tylenol and Oxycodone PRN for pain control    RESPIRATORY:  - No active issues  - Saturating well on room air  - Continuous pulse oximetry  - Maintain O2 saturation >92%    CARDIOVASCULAR:  - Normotensive  - No pressor requirements  - Continue with home dose Coreg  - Keep MAP >65    GI / NUTRITION:  - Regular diet  - GI prophylaxis with Protonix 40mg QD  - Bowel regimen with Dulcolax, Senna and Miralax    RENAL / GENITOURINARY:  - Indwelling menjivar catheter  - Continue with salt tabs 1g q12 hours for hyponatremia  - Monitor electrolytes and replete PRN  - Continue to monitor strict ins and outs q1 hour    HEMATOLOGIC:  - Hemoglobin and hematocrit slowly downtrending  - IR consult for possible embolization  - Transfuse PRN  - VTE prophylaxis with Heparin subcutaneous    INFECTIOUS DISEASE:  - Currently afebrile with leukocytosis of 12.36  - Continue to monitor off antibiotics    ENDOCRINOLOGY:  - No active issues  - Continue to monitor glucose on BMP (goal 140-180)      Disposition: SICU    --------------------------------------------------------------------------------------

## 2022-05-31 NOTE — PROGRESS NOTE ADULT - SUBJECTIVE AND OBJECTIVE BOX
SICU Afternoon Progress Note  =====================================================  Interval / Overnight Events: IR consulted.      HPI:  Patient is a 46 year old male with a PMHx of HTN, HLD, Guillain barre, CVA, bedbound (no movement of lower extremities) and left renal subcapsular hematoma (9/2017) who presented to the ED with right sided pain in the lower abdomen, flank and back x2 days. (27 May 2022 20:16)      PAST MEDICAL & SURGICAL HISTORY:  Guillain-[place] syndrome  HTN (hypertension)  HLD (hyperlipidemia)  CVA (cerebral vascular accident)  Renal hematoma  left, 2017, s/p IR angio with no sign of bleed  Tracheostomy, acute management      ALLERGIES:  apple (Vomiting)  penicillin (Hives)    --------------------------------------------------------------------------------------    MEDICATIONS:    Neurologic Medications  acetaminophen     Tablet .. 975 milliGRAM(s) Oral every 6 hours  oxyCODONE    IR 5 milliGRAM(s) Oral every 4 hours PRN Moderate Pain (4 - 6)  oxyCODONE    IR 10 milliGRAM(s) Oral every 4 hours PRN Severe Pain (7 - 10)    Cardiovascular Medications  carvedilol 6.25 milliGRAM(s) Oral every 12 hours    Gastrointestinal Medications  bisacodyl 5 milliGRAM(s) Oral at bedtime  pantoprazole    Tablet 40 milliGRAM(s) Oral before breakfast  polyethylene glycol 3350 17 Gram(s) Oral daily  senna 2 Tablet(s) Oral at bedtime  sodium chloride 1 Gram(s) Oral every 12 hours    Hematologic/Oncologic Medications  heparin   Injectable 5000 Unit(s) SubCutaneous every 8 hours    Endocrine/Metabolic Medications  atorvastatin 40 milliGRAM(s) Oral at bedtime    --------------------------------------------------------------------------------------    VITAL SIGNS:  T(C): 37.3 (31 May 2022 12:00), Max: 37.7 (30 May 2022 20:00)  T(F): 99.1 (31 May 2022 12:00), Max: 99.8 (30 May 2022 20:00)  HR: 103 (31 May 2022 14:00) (101 - 122)  BP: 125/92 (31 May 2022 14:00) (111/79 - 152/87)  BP(mean): 101 (31 May 2022 14:00) (89 - 112)  RR: 21 (31 May 2022 14:00) (18 - 25)  SpO2: 100% (31 May 2022 14:00) (93% - 100%)    --------------------------------------------------------------------------------------    INS AND OUTS:    30 May 2022 07:01  -  31 May 2022 07:00  --------------------------------------------------------  IN:    Lactated Ringers: 1300 mL    Oral Fluid: 900 mL  Total IN: 2200 mL    OUT:    Indwelling Catheter - Urethral (mL): 2260 mL  Total OUT: 2260 mL    Total NET: -60 mL      31 May 2022 07:01  -  31 May 2022 14:27  --------------------------------------------------------  IN:    Oral Fluid: 340 mL  Total IN: 340 mL    OUT:    Indwelling Catheter - Urethral (mL): 480 mL  Total OUT: 480 mL    Total NET: -140 mL    --------------------------------------------------------------------------------------    EXAM    NEUROLOGY  Exam: Normal, in no acute distress.    HEENT  Exam: Normocephalic, atraumatic.    RESPIRATORY  Exam: Normal expansion / effort.    CARDIOVASCULAR  Exam: S1, S2.  Regular rate and rhythm.    GI/NUTRITION  Exam: Abdomen soft, Non-tender, Non-distended.  Current Diet: Regular diet      METABOLIC / FLUIDS / ELECTROLYTES  sodium chloride 1 Gram(s) Oral every 12 hours      HEMATOLOGIC  [x] VTE Prophylaxis: heparin   Injectable 5000 Unit(s) SubCutaneous every 8 hours      TUBES / LINES / DRAINS  [x] Peripheral IV  [] Central Venous Line     	[] R	[] L	[] IJ	[] Fem	[] SC	Date Placed:   [] Arterial Line		[] R	[] L	[] Fem	[] Rad	[] Ax	Date Placed:   [] PICC		[] Midline		[] Mediport  [] Urinary Catheter		Date Placed:   [x] Necessity of urinary, arterial, and venous catheters discussed  --------------------------------------------------------------------------------------

## 2022-05-31 NOTE — PROGRESS NOTE ADULT - NUTRITIONAL ASSESSMENT
ASSESSMENT:  46y Male    ((insert from previous))***    PLAN:   ***  NEUROLOGY:    RESPIRATORY:  - Maintain O2 saturation >92%    CARDIOVASCULAR:    GI / NUTRITION:    RENAL / GENITOURINARY:  - Indwelling menjivar catheter  - Monitor electrolytes and replete PRN  - Continue to monitor strict ins and outs q1 hour    HEMATOLOGIC:    INFECTIOUS DISEASE:  - Currently afebrile with no leukocytosis  - Continue to monitor off antibiotics    ENDOCRINOLOGY:  - No active issues  - Continue to monitor glucose on BMP (goal 140-180)    Disposition: SICU    --------------------------------------------------------------------------------------    Critical Care Diagnoses:

## 2022-05-31 NOTE — CHART NOTE - NSCHARTNOTEFT_GEN_A_CORE
As per discussion between Dr. Murphy (IR) and Dr. Wright; embolization has high risk of bowel ischemia due to watershed region and surgical management is indicated.

## 2022-05-31 NOTE — PROGRESS NOTE ADULT - CRITICAL CARE ATTENDING COMMENT
I agree with the detailed interval history, physical, and plan, which I have reviewed and edited where appropriate'; also agree with notes/assessment with my team on service.  I have personally examined the patient.  I was physically present for the key portions of the evaluation and management (E/M) service provided.  I reviewed all the pertinent data.  The patient is a critical care patient with life threatening hemodynamic and metabolic instability in SICU.  The SICU team has a constant risk benefit analyzes discussion and coordinating care with the primary team and all consultants.   The patient is in SICU with the chief complaint and diagnosis mentioned in the note.   The plan will be specified in the note.  46 year old male with a PMHx of HTN, HLD, Guillain barre, CVA with large right subcapsular renal hematoma.  Large right anterior pararenal space hematoma with retroperitoneal hemorrhage tracking into the right lower abdomen in SICU for possible IR/  Awake and alert   RESPIRATORY  Exam: clear  CARDIOVASCULAR  Exam: S1, S2.  Regular rate and rhythm.  GI/NUTRITION  Exam: Abdomen soft, Non-tender, Non-distended.    PLAN:  NEUROLOGY:  Guillain [place] syndrome  - Tylenol and Oxycodone PRN for pain control  RESPIRATORY:  - Maintain O2 saturation >92%  CARDIOVASCULAR:  - Keep MAP >65  GI / NUTRITION:  - - GI prophylaxis with Protonix 40mg QD  -RENAL / GENITOURINARY:  - Indwelling menjivar catheter  HEMATOLOGIC: acute blood loss anemia  - IR consult for possible embolization  - Transfuse PRN  - VTE prophylaxis with Heparin subcutaneous  INFECTIOUS DISEASE:  - Continue to monitor off antibiotics  ENDOCRINOLOGY:  - Continue to monitor glucose on BMP (goal 140-180)    Disposition: SICU

## 2022-05-31 NOTE — PROGRESS NOTE ADULT - ASSESSMENT
46y Male pmh CVA, R Subcapsular hematoma, Guillan barre (non-ambulatory) presenting initially to ED for 2 days of worsening RLQ pain with radiation to R back a/w progressive abd distention. Has never had similar pain before, sharp, constant and progressive. Severe, limiting PO intake. No fevers/chills, n/v, change in bowel or urinary habits. During stay in ED pt underwent CT abd/pelvis which demonstrated large right anterior pararenal space hematoma with retroperitoneal hemorrhage tracking into the right lower abdomen . Had drop in H/H from 11.2/34.8 -> 10.4/32.1. Transfused one unit pRBCs.  Never hypotensive with maps in 70s however tachy to low 100s with systolics  75-96. Currently mentating well, appears well perfused.     PLAN:   Neurologic:   - hx CVA, Guillain barre, non-ambulatory at baseline  - no acute issues, AAOx3 mentating well  - Pain meds: Tylenol PRN, oxycodone PRN     Respiratory:   - no acute issues     Cardiovascular: HTN, HLD  - Hemorrhagic shock s/p total 4u PRBC 1u Plts 1u FFP  - 1 additional PRBC given 5/29  - Tachycardia improving  - coreg 6.25mg BID restarted, Home BP meds currently on hold (lisinopril 2.5mg daily)     Gastrointestinal/Nutrition:   -Regular Diet    -Protonix for GI ppx  -given dulcolax 10mg suppository X 1 (no BM in 5 days)    Renal/Genitourinary:   - Subcapsular R renal hematoma with RP bleed. CTA negative for active bleed.  - improvement in UOP and Cr with fluids; will continue to monitor Cr   - Continue menjivar (difficult placement in ED)   - Urology- h/h remain stable s/p additional 1 unit PRBC on 5/29--> No OR at this time     Hematologic:   - s/p 4u pRBC, 1u FFP, 1u platelets  - 5/29/22, 1u PRBC given, post transfusion CBC stable   - Holding chemical DVT ppx --will restart this afternoon if CBC stable   - Appreciate heme/onc recs - coagulopathy studies ordered; requesting Platelet aggregation study however only done in lab on Tues-Thur; plan to send labs tomorrow 5/31    Infectious Disease:   - Afebrile  - Monitor off antibiotics    Lines/Tubes:  - PIV x3    Endocrine:   - no active issues     Disposition: SICU   Patient is a 46 year old male with a PMHx of HTN, HLD, Guillain barre, CVA, bedbound (no movement of lower extremities) and left renal subcapsular hematoma (9/2017) who presented to the ED with right sided pain in the lower abdomen, flank and back x2 days.  CT Abdomen / Pelvis performed showing large right subcapsular renal hematoma.  Large right anterior pararenal space hematoma with retroperitoneal hemorrhage tracking into the right lower abdomen, pelvis and a right inguinal hernia.  Increased size of indeterminate 1.8 cm left interpolar renal lesion, previously demonstrating enhancement and measuring 1.3 cm, suspicious for neoplasm.  I R consulted, however no intervention performed.  CTA Abdomen / Pelvis performed showing large right subcapsular hematoma with adjacent pararenal space retroperitoneal hemorrhage are stable to minimally increased in size since 5/27/2022.  Significant streak artifact limits evaluation.  Within the limitations of this study, there is no evidence of active extravasation.  Increased small complex ascites, particularly in the right lower quadrant, in the interim since 5/27/2022.  Compression of the right renal parenchyma re-demonstrated.  Retained right collecting system contrast suggests ongoing right renal dysfunction.  1.8 cm indeterminate left renal lesion redemonstrated which can be further characterized with abdominal MRI when feasible.  Trace pleural effusions.  Bibasilar dependent atelectasis.  3 mm lingular nodule.      PLAN:    NEUROLOGY:  - Hx of Guillain [place] syndrome  - Tylenol and Oxycodone PRN for pain control    RESPIRATORY:  - No active issues  - Saturating well on room air  - Continuous pulse oximetry  - Maintain O2 saturation >92%    CARDIOVASCULAR:  - Normotensive  - No pressor requirements  - Continue with home dose Coreg  - Keep MAP >65    GI / NUTRITION:  - Regular diet  - GI prophylaxis with Protonix 40mg QD  - Bowel regimen with Dulcolax, Senna and Miralax    RENAL / GENITOURINARY:  - Indwelling menjivar catheter  - Continue with salt tabs 1g q12 hours for hyponatremia  - Monitor electrolytes and replete PRN  - Continue to monitor strict ins and outs q1 hour    HEMATOLOGIC:  - Monitor hemoglobin and hematocrit  - Transfuse PRN  - VTE prophylaxis with Heparin subcutaneous    INFECTIOUS DISEASE:  - Currently afebrile with leukocytosis of 12.36  - Continue to monitor off antibiotics    ENDOCRINOLOGY:  - No active issues  - Continue to monitor glucose on BMP (goal 140-180)      Disposition: SICU    -------------------------------------------------------------------------------------- Patient is a 46 year old male with a PMHx of HTN, HLD, Guillain barre, CVA, bedbound (no movement of lower extremities) and left renal subcapsular hematoma (9/2017) who presented to the ED with right sided pain in the lower abdomen, flank and back x2 days.  CT Abdomen / Pelvis performed showing large right subcapsular renal hematoma.  Large right anterior pararenal space hematoma with retroperitoneal hemorrhage tracking into the right lower abdomen, pelvis and a right inguinal hernia.  Increased size of indeterminate 1.8 cm left interpolar renal lesion, previously demonstrating enhancement and measuring 1.3 cm, suspicious for neoplasm.  I R consulted, however no intervention performed.  CTA Abdomen / Pelvis performed showing large right subcapsular hematoma with adjacent pararenal space retroperitoneal hemorrhage are stable to minimally increased in size since 5/27/2022.  Significant streak artifact limits evaluation.  Within the limitations of this study, there is no evidence of active extravasation.  Increased small complex ascites, particularly in the right lower quadrant, in the interim since 5/27/2022.  Compression of the right renal parenchyma re-demonstrated.  Retained right collecting system contrast suggests ongoing right renal dysfunction.  1.8 cm indeterminate left renal lesion redemonstrated which can be further characterized with abdominal MRI when feasible.  Trace pleural effusions.  Bibasilar dependent atelectasis.  3 mm lingular nodule.      PLAN:    NEUROLOGY:  - Hx of Guillain [place] syndrome  - Tylenol and Oxycodone PRN for pain control    RESPIRATORY:  - No active issues  - Saturating well on room air  - Continuous pulse oximetry  - Maintain O2 saturation >92%    CARDIOVASCULAR:  - Normotensive  - No pressor requirements  - Continue with home dose Coreg  - Keep MAP >65    GI / NUTRITION:  - Regular diet  - GI prophylaxis with Protonix 40mg QD  - Bowel regimen with Dulcolax, Senna and Miralax    RENAL / GENITOURINARY:  - Indwelling menjivar catheter  - Continue with salt tabs 1g q12 hours for hyponatremia  - Monitor electrolytes and replete PRN  - Continue to monitor strict ins and outs q1 hour    HEMATOLOGIC:  - Hemoglobin and hematocrit slowly downtrending  - IR consult for possible embolization  - Transfuse PRN  - VTE prophylaxis with Heparin subcutaneous    INFECTIOUS DISEASE:  - Currently afebrile with leukocytosis of 12.36  - Continue to monitor off antibiotics    ENDOCRINOLOGY:  - No active issues  - Continue to monitor glucose on BMP (goal 140-180)      Disposition: SICU    --------------------------------------------------------------------------------------

## 2022-05-31 NOTE — PROGRESS NOTE ADULT - ASSESSMENT
47 yo M h/o Guillain barre, CVA, bedbound and no movement of lower extremities, and Left renal subcapsular hematoma 9/2017, presented to the ED 5/27/2022 with right sided pain in the lower abdomen, flank and back x 2 days, N/V.  Reports little to no urge to void over the past 24 hours.  Home attendant at the bedside confirms patient has been oliguric over the past 24 hours.  Denies hematuria or dysuria.  Denies fevers. Patient was hypotensive with BP in the 90s with a normal heart rate.  Hgb 11.2, HCt 34.8.  CT performed showing a right renal subcapsular hematoma.  Patient became tachycardic, and more hypotensive with SBP in the 70s.  Repeat HGB 10.4 HCT 32.1. Patient transfused and admitted to SICU.  Patient had a left renal subcapsular hematoma 9/2017, requiring transfusions at that time, and ultimately IR intervention without findings of active bleeding.  Patient recently had a toenail removed, and reports excessive bleeding post procedure requiring Surgicel and pressure dressing.  He denies other episodes of poor clotting, bleeding, or easy bruising. He only takes ASA 81mg, denies other forms of anticoagulation/antiplatelet.     5/27: admitted to SICU for hemodynamic monitoring in setting of large R subcapsular hematoma, transfused 3u pRBC  5/28: transfused 1u pRBC, 1 FFP, 1 platelets; heme-onc consulted, recommend chest CT  5/29: Hct continues to downtrending, IR consult pending, CTA no active extrav, IR with no intervention plans, 1u PRBC  5/30: Doing well, pain controlled  5/31: Had bowel movement, feels well, denies pain     Plan:  -monitor UO  -monitor HCT  -monitor abdominal exam  -f/u heme  -pain control  -continue menjivar  -Advance diet   -appreciate SICU care

## 2022-05-31 NOTE — PROGRESS NOTE ADULT - SUBJECTIVE AND OBJECTIVE BOX
SICU AM PROGRESS NOTE  ===============================    HPI: 46 male with h/o Guillain barre, CVA, bedbound and no movement of lower extremities, and Left renal subcapsular hematoma 9/2017, presenting to the ED with right sided pain in the lower abdomen, flank and back x 2 days.  Complains of nausea with emesis.  Reports little to no urge to void over the past 24 hours.  Home attendant at the bedside confirms patient has been oliguric over the past 24 hours.  Denies hematuria or dysuria.  Denies fevers.  Initial presentation, patient is hypotensive with BP in the 90s with a normal heart rate.  Hgb 11.2, HCt 34.8.  CT performed showing a right renal subcapsular hematoma.  Patient became tachycardic, and more hypotensive with SBP in the 70s.  Repeat HGB 10.4 HCT 32.1. Patient requiring blood transfusions, and admission to the SICU.  Patient had a left renal subcapsular hematoma 9/2017, requiring transfusions at that time, and ultimately IR intervention without findings of active bleeding.  Patient recently had a toenail removed, and reports excessive bleeding post procedure requiring Surgicel and pressure dressing.  He denies other episodes of poor clotting, bleeding, or easy bruising. He only takes ASA 81mg, denies other forms of anticoagulation/antiplatelet.     Interval Events:  -H&H stable  -CT sc/a/p: Large right subcapsular hematoma with adjacent pararenal space retroperitoneal hemorrhage are stable to minimally increased in size since 5/27/2022.  -No OR   -restarted home coreg   -c/o abdominal fullness, hasn't has BM in 5 days (normally goes every other day)  -given dulcolax suppository 10mg X 1     MEDICATIONS  (STANDING):  acetaminophen     Tablet .. 975 milliGRAM(s) Oral every 6 hours  atorvastatin 40 milliGRAM(s) Oral at bedtime  bisacodyl 5 milliGRAM(s) Oral at bedtime  bisacodyl Suppository 10 milliGRAM(s) Rectal once  carvedilol 6.25 milliGRAM(s) Oral every 12 hours  heparin   Injectable 5000 Unit(s) SubCutaneous every 8 hours  pantoprazole    Tablet 40 milliGRAM(s) Oral before breakfast  polyethylene glycol 3350 17 Gram(s) Oral daily  senna 2 Tablet(s) Oral at bedtime  sodium chloride 1 Gram(s) Oral every 12 hours    MEDICATIONS  (PRN):  oxyCODONE    IR 5 milliGRAM(s) Oral every 4 hours PRN Moderate Pain (4 - 6)  oxyCODONE    IR 10 milliGRAM(s) Oral every 4 hours PRN Severe Pain (7 - 10)    ICU Vital Signs Last 24 Hrs  T(C): 37.7 (30 May 2022 20:00), Max: 37.7 (30 May 2022 20:00)  T(F): 99.8 (30 May 2022 20:00), Max: 99.8 (30 May 2022 20:00)  HR: 111 (31 May 2022 00:00) (101 - 122)  BP: 126/89 (31 May 2022 00:00) (110/72 - 152/87)  BP(mean): 100 (31 May 2022 00:00) (85 - 114)  ABP: --  ABP(mean): --  RR: 20 (31 May 2022 00:00) (13 - 25)  SpO2: 100% (30 May 2022 20:00) (97% - 100%)  I&O's Detail    29 May 2022 07:01  -  30 May 2022 07:00  --------------------------------------------------------  IN:    IV PiggyBack: 100 mL    Lactated Ringers: 2400 mL    Oral Fluid: 200 mL    PRBCs (Packed Red Blood Cells): 300 mL    Sodium Chloride 0.9% Bolus: 1000 mL  Total IN: 4000 mL    OUT:    Indwelling Catheter - Urethral (mL): 2105 mL  Total OUT: 2105 mL    Total NET: 1895 mL      30 May 2022 07:01  -  31 May 2022 00:36  --------------------------------------------------------  IN:    Lactated Ringers: 1300 mL    Oral Fluid: 900 mL  Total IN: 2200 mL    OUT:    Indwelling Catheter - Urethral (mL): 1840 mL  Total OUT: 1840 mL    Total NET: 360 mL      --------------------------------------------------------------------------------------    EXAM  NEUROLOGY  Exam: Normal, NAD, alert, oriented x3, no focal deficits. PERRLA.       RESPIRATORY  Exam: Lungs clear to auscultation, Normal expansion/effort.    CARDIOVASCULAR  Exam: S1, S2.  Regular rate and rhythm     GI/NUTRITION  Exam: Abdomen soft, Non-tender, moderately distended  Current Diet:  NPO    METABOLIC/FLUIDS/ELECTROLYTES  lactated ringers. 1000 milliLiter(s) IV Continuous <Continuous>      HEMATOLOGIC  [x] DVT Prophylaxis:   Transfusions:	[] PRBC	[] Platelets		[] FFP	[] Cryoprecipitate    INFECTIOUS DISEASE  Antimicrobials/Immunologic Medications:      VASCULAR  Exam: Extremities warm, pink, well-perfused.      MUSCULOSKELETAL  Exam: All extremities moving spontaneously without limitations.      SKIN  Exam: Good skin turgor, no skin breakdown.     LABS  --------------------------------------------------------------------------------------  CBC (05-30 @ 14:30)                              8.9<L>                         12.73<H>  )----------------(  201        --    % Neutrophils, --    % Lymphocytes, ANC: --                                  27.5<L>  CBC (05-30 @ 03:50)                              9.3<L>                         12.07<H>  )----------------(  192        --    % Neutrophils, --    % Lymphocytes, ANC: --                                  28.7<L>    BMP (05-30 @ 03:50)             131<L>  |  99      |  31<H> 		Ca++ --      Ca 8.4                ---------------------------------( 104<H>		Mg 2.20               4.7     |  20<L>   |  1.90<H>			Ph 3.2     BMP (05-29 @ 17:04)             131<L>  |  97<L>   |  31<H> 		Ca++ --      Ca 7.8<L>             ---------------------------------( 100<H>		Mg 1.80               4.7     |  22      |  2.07<H>			Ph 3.5         Reed (05-30 @ 03:50)  aPTT 27.5 / INR 1.12 / PT 13.0        --------------------------------------------------------------------------------------   SICU Daily Progress Note  =====================================================  Interval / Overnight Events: No plans for OR today per urology.  Suppository x1 given overnight.      HPI:  Patient is a 46 year old male with a PMHx of HTN, HLD, Guillain barre, CVA, bedbound (no movement of lower extremities) and left renal subcapsular hematoma (9/2017) who presented to the ED with right sided pain in the lower abdomen, flank and back x2 days. (27 May 2022 20:16)      PAST MEDICAL & SURGICAL HISTORY:  Guillain-[place] syndrome  HTN (hypertension)  HLD (hyperlipidemia)  CVA (cerebral vascular accident)  Renal hematoma  left, 2017, s/p IR angio with no sign of bleed  Tracheostomy, acute management      ALLERGIES:  apple (Vomiting)  penicillin (Hives)    --------------------------------------------------------------------------------------    MEDICATIONS:    Neurologic Medications  acetaminophen     Tablet .. 975 milliGRAM(s) Oral every 6 hours  oxyCODONE    IR 5 milliGRAM(s) Oral every 4 hours PRN Moderate Pain (4 - 6)  oxyCODONE    IR 10 milliGRAM(s) Oral every 4 hours PRN Severe Pain (7 - 10)    Cardiovascular Medications  carvedilol 6.25 milliGRAM(s) Oral every 12 hours    Gastrointestinal Medications  bisacodyl 5 milliGRAM(s) Oral at bedtime  pantoprazole    Tablet 40 milliGRAM(s) Oral before breakfast  polyethylene glycol 3350 17 Gram(s) Oral daily  senna 2 Tablet(s) Oral at bedtime  sodium chloride 1 Gram(s) Oral every 12 hours    Hematologic/Oncologic Medications  heparin   Injectable 5000 Unit(s) SubCutaneous every 8 hours    Endocrine/Metabolic Medications  atorvastatin 40 milliGRAM(s) Oral at bedtime    --------------------------------------------------------------------------------------    VITAL SIGNS:  T(C): 36.3 (31 May 2022 08:00), Max: 37.7 (30 May 2022 20:00)  T(F): 97.3 (31 May 2022 08:00), Max: 99.8 (30 May 2022 20:00)  HR: 106 (31 May 2022 09:00) (101 - 122)  BP: 128/91 (31 May 2022 09:00) (111/79 - 152/87)  BP(mean): 103 (31 May 2022 09:00) (89 - 114)  RR: 23 (31 May 2022 09:00) (13 - 25)  SpO2: 93% (31 May 2022 09:00) (93% - 100%)    --------------------------------------------------------------------------------------    INS AND OUTS:    30 May 2022 07:01  -  31 May 2022 07:00  --------------------------------------------------------  IN:    Lactated Ringers: 1300 mL    Oral Fluid: 900 mL  Total IN: 2200 mL    OUT:    Indwelling Catheter - Urethral (mL): 2260 mL  Total OUT: 2260 mL    Total NET: -60 mL      31 May 2022 07:01  -  31 May 2022 10:01  --------------------------------------------------------  IN:  Total IN: 0 mL    OUT:    Indwelling Catheter - Urethral (mL): 135 mL  Total OUT: 135 mL    Total NET: -135 mL    --------------------------------------------------------------------------------------    EXAM    NEUROLOGY  Exam: Normal, in no acute distress.    HEENT  Exam: Normocephalic, atraumatic.    RESPIRATORY  Exam: Normal expansion / effort.    CARDIOVASCULAR  Exam: S1, S2.  Regular rate and rhythm.    GI/NUTRITION  Exam: Abdomen soft, Non-tender, Non-distended.  Current Diet: Regular diet      METABOLIC / FLUIDS / ELECTROLYTES  sodium chloride 1 Gram(s) Oral every 12 hours      HEMATOLOGIC  [x] VTE Prophylaxis: heparin   Injectable 5000 Unit(s) SubCutaneous every 8 hours      TUBES / LINES / DRAINS  [x] Peripheral IV  [] Central Venous Line     	[] R	[] L	[] IJ	[] Fem	[] SC	Date Placed:   [] Arterial Line		[] R	[] L	[] Fem	[] Rad	[] Ax	Date Placed:   [] PICC		[] Midline		[] Mediport  [] Urinary Catheter		Date Placed:   [x] Necessity of urinary, arterial, and venous catheters discussed    --------------------------------------------------------------------------------------

## 2022-06-01 DIAGNOSIS — N17.9 ACUTE KIDNEY FAILURE, UNSPECIFIED: ICD-10-CM

## 2022-06-01 DIAGNOSIS — E78.5 HYPERLIPIDEMIA, UNSPECIFIED: ICD-10-CM

## 2022-06-01 DIAGNOSIS — Z86.73 PERSONAL HISTORY OF TRANSIENT ISCHEMIC ATTACK (TIA), AND CEREBRAL INFARCTION WITHOUT RESIDUAL DEFICITS: ICD-10-CM

## 2022-06-01 DIAGNOSIS — D62 ACUTE POSTHEMORRHAGIC ANEMIA: ICD-10-CM

## 2022-06-01 DIAGNOSIS — I10 ESSENTIAL (PRIMARY) HYPERTENSION: ICD-10-CM

## 2022-06-01 LAB
ANION GAP SERPL CALC-SCNC: 10 MMOL/L — SIGNIFICANT CHANGE UP (ref 7–14)
BUN SERPL-MCNC: 22 MG/DL — SIGNIFICANT CHANGE UP (ref 7–23)
CALCIUM SERPL-MCNC: 8.7 MG/DL — SIGNIFICANT CHANGE UP (ref 8.4–10.5)
CHLORIDE SERPL-SCNC: 100 MMOL/L — SIGNIFICANT CHANGE UP (ref 98–107)
CO2 SERPL-SCNC: 23 MMOL/L — SIGNIFICANT CHANGE UP (ref 22–31)
CREAT SERPL-MCNC: 1.1 MG/DL — SIGNIFICANT CHANGE UP (ref 0.5–1.3)
EGFR: 84 ML/MIN/1.73M2 — SIGNIFICANT CHANGE UP
GLUCOSE SERPL-MCNC: 92 MG/DL — SIGNIFICANT CHANGE UP (ref 70–99)
HCT VFR BLD CALC: 27.1 % — LOW (ref 39–50)
HGB BLD-MCNC: 8.4 G/DL — LOW (ref 13–17)
MAGNESIUM SERPL-MCNC: 1.9 MG/DL — SIGNIFICANT CHANGE UP (ref 1.6–2.6)
MCHC RBC-ENTMCNC: 28.8 PG — SIGNIFICANT CHANGE UP (ref 27–34)
MCHC RBC-ENTMCNC: 31 GM/DL — LOW (ref 32–36)
MCV RBC AUTO: 92.8 FL — SIGNIFICANT CHANGE UP (ref 80–100)
NRBC # BLD: 0 /100 WBCS — SIGNIFICANT CHANGE UP
NRBC # FLD: 0 K/UL — SIGNIFICANT CHANGE UP
PHOSPHATE SERPL-MCNC: 2.3 MG/DL — LOW (ref 2.5–4.5)
PLATELET # BLD AUTO: 221 K/UL — SIGNIFICANT CHANGE UP (ref 150–400)
POTASSIUM SERPL-MCNC: 5 MMOL/L — SIGNIFICANT CHANGE UP (ref 3.5–5.3)
POTASSIUM SERPL-SCNC: 5 MMOL/L — SIGNIFICANT CHANGE UP (ref 3.5–5.3)
RBC # BLD: 2.92 M/UL — LOW (ref 4.2–5.8)
RBC # FLD: 15.1 % — HIGH (ref 10.3–14.5)
SODIUM SERPL-SCNC: 133 MMOL/L — LOW (ref 135–145)
WBC # BLD: 11.38 K/UL — HIGH (ref 3.8–10.5)
WBC # FLD AUTO: 11.38 K/UL — HIGH (ref 3.8–10.5)

## 2022-06-01 PROCEDURE — 99223 1ST HOSP IP/OBS HIGH 75: CPT

## 2022-06-01 PROCEDURE — 99231 SBSQ HOSP IP/OBS SF/LOW 25: CPT

## 2022-06-01 PROCEDURE — 99232 SBSQ HOSP IP/OBS MODERATE 35: CPT

## 2022-06-01 RX ADMIN — Medication 975 MILLIGRAM(S): at 00:00

## 2022-06-01 RX ADMIN — CARVEDILOL PHOSPHATE 6.25 MILLIGRAM(S): 80 CAPSULE, EXTENDED RELEASE ORAL at 06:41

## 2022-06-01 RX ADMIN — PANTOPRAZOLE SODIUM 40 MILLIGRAM(S): 20 TABLET, DELAYED RELEASE ORAL at 06:41

## 2022-06-01 RX ADMIN — SODIUM CHLORIDE 1 GRAM(S): 9 INJECTION INTRAMUSCULAR; INTRAVENOUS; SUBCUTANEOUS at 06:41

## 2022-06-01 RX ADMIN — Medication 5 MILLIGRAM(S): at 21:23

## 2022-06-01 RX ADMIN — HEPARIN SODIUM 5000 UNIT(S): 5000 INJECTION INTRAVENOUS; SUBCUTANEOUS at 21:23

## 2022-06-01 RX ADMIN — SENNA PLUS 2 TABLET(S): 8.6 TABLET ORAL at 21:23

## 2022-06-01 RX ADMIN — Medication 975 MILLIGRAM(S): at 07:14

## 2022-06-01 RX ADMIN — Medication 975 MILLIGRAM(S): at 19:06

## 2022-06-01 RX ADMIN — HEPARIN SODIUM 5000 UNIT(S): 5000 INJECTION INTRAVENOUS; SUBCUTANEOUS at 06:41

## 2022-06-01 RX ADMIN — Medication 975 MILLIGRAM(S): at 06:40

## 2022-06-01 RX ADMIN — HEPARIN SODIUM 5000 UNIT(S): 5000 INJECTION INTRAVENOUS; SUBCUTANEOUS at 13:43

## 2022-06-01 RX ADMIN — CARVEDILOL PHOSPHATE 6.25 MILLIGRAM(S): 80 CAPSULE, EXTENDED RELEASE ORAL at 19:06

## 2022-06-01 RX ADMIN — SODIUM CHLORIDE 1 GRAM(S): 9 INJECTION INTRAMUSCULAR; INTRAVENOUS; SUBCUTANEOUS at 19:06

## 2022-06-01 RX ADMIN — Medication 975 MILLIGRAM(S): at 00:30

## 2022-06-01 RX ADMIN — ATORVASTATIN CALCIUM 40 MILLIGRAM(S): 80 TABLET, FILM COATED ORAL at 21:24

## 2022-06-01 NOTE — PROGRESS NOTE ADULT - SUBJECTIVE AND OBJECTIVE BOX
SICU Daily Progress Note  =====================================================  Interval/Overnight Events:   No acute events overnight.     HPI:  46 year old male with a PMHx of HTN, HLD, Guillain barre, CVA, bedbound (no movement of lower extremities) and left renal subcapsular hematoma (9/2017) who presented to the ED with right sided pain in the lower abdomen, flank and back x2 days.      Allergies: apple (Vomiting)  penicillin (Hives)      MEDICATIONS:   --------------------------------------------------------------------------------------  Neurologic Medications  acetaminophen     Tablet .. 975 milliGRAM(s) Oral every 6 hours  oxyCODONE    IR 5 milliGRAM(s) Oral every 4 hours PRN Moderate Pain (4 - 6)  oxyCODONE    IR 10 milliGRAM(s) Oral every 4 hours PRN Severe Pain (7 - 10)    Respiratory Medications    Cardiovascular Medications  carvedilol 6.25 milliGRAM(s) Oral every 12 hours    Gastrointestinal Medications  bisacodyl 5 milliGRAM(s) Oral at bedtime  pantoprazole    Tablet 40 milliGRAM(s) Oral before breakfast  polyethylene glycol 3350 17 Gram(s) Oral daily  senna 2 Tablet(s) Oral at bedtime  sodium chloride 1 Gram(s) Oral every 12 hours    Genitourinary Medications    Hematologic/Oncologic Medications  heparin   Injectable 5000 Unit(s) SubCutaneous every 8 hours    Antimicrobial/Immunologic Medications    Endocrine/Metabolic Medications  atorvastatin 40 milliGRAM(s) Oral at bedtime    Topical/Other Medications    --------------------------------------------------------------------------------------    VITAL SIGNS, INS/OUTS (last 24 hours):  --------------------------------------------------------------------------------------  Vital Signs Last 24 Hrs  T(C): 37.2 (31 May 2022 20:00), Max: 37.7 (31 May 2022 16:00)  T(F): 99 (31 May 2022 20:00), Max: 99.8 (31 May 2022 16:00)  HR: 103 (31 May 2022 23:00) (100 - 116)  BP: 134/86 (31 May 2022 23:00) (111/79 - 141/91)  BP(mean): 95 (31 May 2022 23:00) (89 - 111)  RR: 25 (31 May 2022 23:00) (18 - 25)  SpO2: 100% (31 May 2022 23:00) (93% - 100%)    I&O's Detail    30 May 2022 07:01  -  31 May 2022 07:00  --------------------------------------------------------  IN:    Lactated Ringers: 1300 mL    Oral Fluid: 900 mL  Total IN: 2200 mL    OUT:    Indwelling Catheter - Urethral (mL): 2260 mL  Total OUT: 2260 mL    Total NET: -60 mL      31 May 2022 07:01  -  01 Jun 2022 00:29  --------------------------------------------------------  IN:    Oral Fluid: 700 mL  Total IN: 700 mL    OUT:    Indwelling Catheter - Urethral (mL): 1470 mL  Total OUT: 1470 mL    Total NET: -770 mL        --------------------------------------------------------------------------------------    EXAM  NEUROLOGY  RASS:   	GCS:    Exam: Normal, NAD, alert, oriented x3    HEENT  Exam: Normocephalic, atraumatic.    RESPIRATORY  Exam: Lungs clear to auscultation, Normal expansion/effort.    CARDIOVASCULAR  Exam: S1, S2.  Regular rate and rhythm.       GI/NUTRITION  Exam: Abdomen soft, Non-tender, Non-distended.   Current Diet:  Regular diet     VASCULAR  Exam: Extremities warm, pink, well-perfused.       SKIN  Exam: Good skin turgor, no skin breakdown.     METABOLIC/FLUIDS/ELECTROLYTES  sodium chloride 1 Gram(s) Oral every 12 hours      HEMATOLOGIC  [x] VTE Prophylaxis: heparin   Injectable 5000 Unit(s) SubCutaneous every 8 hours    Transfusions:	[] PRBC	[] Platelets		[] FFP	[] Cryoprecipitate    INFECTIOUS DISEASE  Antimicrobials/Immunologic Medications:      Tubes/Lines/Drains   [x] Peripheral IV  [] Central Venous Line     	[] R	[] L	[] IJ	[] Fem	[] SC	Date Placed:   [] Arterial Line		[] R	[] L	[] Fem	[] Rad	[] Ax	Date Placed:   [] PICC		[] Midline		[] Mediport  [] Urinary Catheter		Date Placed:   [x] Necessity of urinary, arterial, and venous catheters discussed    LABS  --------------------------------------------------------------------------------------                        8.6    12.48 )-----------( 204      ( 31 May 2022 12:20 )             26.3     05-31    132<L>  |  101  |  25<H>  ----------------------------<  100<H>  4.7   |  22  |  1.34<H>    Ca    8.6      31 May 2022 05:21  Phos  2.6     05-31  Mg     2.10     05-31      --------------------------------------------------------------------------------------    OTHER LABORATORY:     IMAGING STUDIES:     CXR:  Pending     ASSESSMENT:  46 year old male with a PMHx of HTN, HLD, Guillain barre, CVA, bedbound (no movement of lower extremities) and left renal subcapsular hematoma (9/2017) who presented to the ED with right sided pain in the lower abdomen, flank and back x2 days.  CT Abdomen / Pelvis performed showing large right subcapsular renal hematoma.  Large right anterior pararenal space hematoma with retroperitoneal hemorrhage tracking into the right lower abdomen, pelvis and a right inguinal hernia.  Increased size of indeterminate 1.8 cm left interpolar renal lesion, previously demonstrating enhancement and measuring 1.3 cm, suspicious for neoplasm.  I R consulted, however no intervention performed.  CTA Abdomen / Pelvis performed showing large right subcapsular hematoma with adjacent pararenal space retroperitoneal hemorrhage are stable to minimally increased in size since 5/27/2022.  Significant streak artifact limits evaluation.  Within the limitations of this study, there is no evidence of active extravasation.  Increased small complex ascites, particularly in the right lower quadrant, in the interim since 5/27/2022.  Compression of the right renal parenchyma re-demonstrated.  Retained right collecting system contrast suggests ongoing right renal dysfunction.  1.8 cm indeterminate left renal lesion redemonstrated which can be further characterized with abdominal MRI when feasible.  Trace pleural effusions.  Bibasilar dependent atelectasis.  3 mm lingular nodule.      PLAN:    NEUROLOGY:  - Hx of Guillain [place] syndrome  - Tylenol and Oxycodone PRN for pain control    RESPIRATORY:  - No active issues  - Saturating well on room air  - Continuous pulse oximetry  - Maintain O2 saturation >92%    CARDIOVASCULAR:  - Normotensive  - No pressor requirements  - Continue with home dose Coreg  - Keep MAP >65    GI / NUTRITION:  - Regular diet  - GI prophylaxis with Protonix 40mg QD  - Bowel regimen with Dulcolax, Senna and Miralax    RENAL / GENITOURINARY:  - Indwelling menjivar catheter  - Continue with salt tabs 1g q12 hours for hyponatremia  - Monitor electrolytes and replete PRN  - Continue to monitor strict ins and outs q1 hour    HEMATOLOGIC:  - Hemoglobin and hematocrit slowly downtrending  - IR consult for possible embolization  - Transfuse PRN  - VTE prophylaxis with Heparin subcutaneous    INFECTIOUS DISEASE:  - Currently afebrile with leukocytosis of 12.48  - Continue to monitor off antibiotics    ENDOCRINOLOGY:  - No active issues  - Continue to monitor glucose on BMP (goal 140-180)      Disposition: SICU  --------------------------------------------------------------------------------------    Critical Care Diagnoses: SICU Daily Progress Note  =====================================================  Interval / Overnight Events: No acute events overnight.      HPI:  Patient is a 46 year old male with a PMHx of HTN, HLD, Guillain barre, CVA, bedbound (no movement of lower extremities) and left renal subcapsular hematoma (9/2017) who presented to the ED with right sided pain in the lower abdomen, flank and back x2 days. (27 May 2022 20:16)      PAST MEDICAL & SURGICAL HISTORY:  Guillain-[place] syndrome  HTN (hypertension)  HLD (hyperlipidemia)  CVA (cerebral vascular accident)  Renal hematoma  left, 2017, s/p IR angio with no sign of bleed  Tracheostomy, acute management      ALLERGIES:  apple (Vomiting)  penicillin (Hives)    --------------------------------------------------------------------------------------    MEDICATIONS:    Neurologic Medications  acetaminophen     Tablet .. 975 milliGRAM(s) Oral every 6 hours  oxyCODONE    IR 5 milliGRAM(s) Oral every 4 hours PRN Moderate Pain (4 - 6)  oxyCODONE    IR 10 milliGRAM(s) Oral every 4 hours PRN Severe Pain (7 - 10)    Cardiovascular Medications  carvedilol 6.25 milliGRAM(s) Oral every 12 hours    Gastrointestinal Medications  bisacodyl 5 milliGRAM(s) Oral at bedtime  pantoprazole    Tablet 40 milliGRAM(s) Oral before breakfast  polyethylene glycol 3350 17 Gram(s) Oral daily  senna 2 Tablet(s) Oral at bedtime  sodium chloride 1 Gram(s) Oral every 12 hours    Hematologic/Oncologic Medications  heparin   Injectable 5000 Unit(s) SubCutaneous every 8 hours    Endocrine/Metabolic Medications  atorvastatin 40 milliGRAM(s) Oral at bedtime    --------------------------------------------------------------------------------------    VITAL SIGNS:  T(C): 37.3 (01 Jun 2022 04:00), Max: 37.7 (31 May 2022 16:00)  T(F): 99.2 (01 Jun 2022 04:00), Max: 99.8 (31 May 2022 16:00)  HR: 101 (01 Jun 2022 07:00) (98 - 119)  BP: 124/89 (01 Jun 2022 07:00) (116/68 - 141/91)  BP(mean): 99 (01 Jun 2022 07:00) (83 - 111)  RR: 16 (01 Jun 2022 07:00) (15 - 25)  SpO2: 100% (01 Jun 2022 07:00) (93% - 100%)    --------------------------------------------------------------------------------------    INS AND OUTS:    31 May 2022 07:01  -  01 Jun 2022 07:00  --------------------------------------------------------  IN:    Oral Fluid: 700 mL  Total IN: 700 mL    OUT:    Indwelling Catheter - Urethral (mL): 2300 mL  Total OUT: 2300 mL    Total NET: -1600 mL    --------------------------------------------------------------------------------------    EXAM    NEUROLOGY  Exam: Normal, in no acute distress.    HEENT  Exam: Normocephalic, atraumatic.    RESPIRATORY  Exam: Normal expansion / effort.    CARDIOVASCULAR  Exam: S1, S2.  Regular rate and rhythm.    GI/NUTRITION  Exam: Abdomen soft, Non-tender, Non-distended.  Current Diet: Regular diet      METABOLIC / FLUIDS / ELECTROLYTES  sodium chloride 1 Gram(s) Oral every 12 hours      HEMATOLOGIC  [x] VTE Prophylaxis: heparin   Injectable 5000 Unit(s) SubCutaneous every 8 hours      INFECTIOUS DISEASE  Antimicrobials/Immunologic Medications:      TUBES / LINES / DRAINS  [x] Peripheral IV  [] Central Venous Line     	[] R	[] L	[] IJ	[] Fem	[] SC	Date Placed:   [] Arterial Line		[] R	[] L	[] Fem	[] Rad	[] Ax	Date Placed:   [] PICC		[] Midline		[] Mediport  [x] Urinary Catheter		Date Placed:   [x] Necessity of urinary, arterial, and venous catheters discussed    --------------------------------------------------------------------------------------

## 2022-06-01 NOTE — PROGRESS NOTE ADULT - SUBJECTIVE AND OBJECTIVE BOX
INTERVAL HPI/OVERNIGHT EVENTS:  No acute events overnight. Doing well. H/H stable, intermittently tachy through night, listed for the floor. Denies pain, N/V.    VITALS:    T(F): 99.5 (06-01-22 @ 08:00), Max: 99.8 (05-31-22 @ 16:00)  HR: 104 (06-01-22 @ 09:00) (96 - 119)  BP: 123/81 (06-01-22 @ 09:00) (116/68 - 141/91)  RR: 20 (06-01-22 @ 09:00) (15 - 26)  SpO2: 100% (06-01-22 @ 09:00) (98% - 100%)  Wt(kg): --    I&O's Detail    31 May 2022 07:01  -  01 Jun 2022 07:00  --------------------------------------------------------  IN:    Oral Fluid: 700 mL  Total IN: 700 mL    OUT:    Indwelling Catheter - Urethral (mL): 2300 mL  Total OUT: 2300 mL    Total NET: -1600 mL      01 Jun 2022 07:01  -  01 Jun 2022 10:05  --------------------------------------------------------  IN:    Oral Fluid: 350 mL  Total IN: 350 mL    OUT:  Total OUT: 0 mL    Total NET: 350 mL          MEDICATIONS:    ANTIBIOTICS:      PAIN CONTROL:  acetaminophen     Tablet .. 975 milliGRAM(s) Oral every 6 hours  oxyCODONE    IR 5 milliGRAM(s) Oral every 4 hours PRN  oxyCODONE    IR 10 milliGRAM(s) Oral every 4 hours PRN       MEDS:      HEME/ONC  heparin   Injectable 5000 Unit(s) SubCutaneous every 8 hours        PHYSICAL EXAM:  General: No acute distress.  Alert and Oriented  Abdominal Exam: Softly distended, nontender   Exam: Park in place      LABS:                        8.4    11.38 )-----------( 221      ( 01 Jun 2022 05:30 )             27.1     06-01    133<L>  |  100  |  22  ----------------------------<  92  5.0   |  23  |  1.10    Ca    8.7      01 Jun 2022 05:30  Phos  2.3     06-01  Mg     1.90     06-01

## 2022-06-01 NOTE — CONSULT NOTE ADULT - CONSULT REASON
R. renal subcapsular and pre-renal space hematoma
comangement
renal lesion management
Angiogram and embolization for Perinephric/RP bleed
Hemodynamic instability, R renal subcapsular hematoma

## 2022-06-01 NOTE — CONSULT NOTE ADULT - SUBJECTIVE AND OBJECTIVE BOX
PMD Dr. Sharp in [place]    Patient is a 46y old  Male who presents with a chief complaint of Hemorrhage, right renal subcapsular hematoma (01 Jun 2022 10:05)    HPI: 46M unable to walk, 24/7 HHAs,  h/o Guillain barre, CVA, bedbound and no movement of lower extremities, and Left renal subcapsular hematoma 9/2017, presenting to the ED with right sided pain in the lower abdomen, flank and back x 2 days.  Complained of nausea with emesis.  Reports little to no urge to void over the past 24 hours.  Home attendant at the bedside confirms patient has been oliguric over the past 24 hours.  Denies hematuria or dysuria.  Denies fevers.  Initial presentation, patient is hypotensive with BP in the 90s with a normal heart rate.  Hgb 11.2, HCt 34.8.  CT performed showing a right renal subcapsular hematoma.  Patient became tachycardic, and more hypotensive with SBP in the 70s.  Repeat HGB 10.4 HCT 32.1. Patient requiring blood transfusions, and admission to the SICU.  Patient had a left renal subcapsular hematoma 9/2017, requiring transfusions at that time, and ultimately IR intervention without findings of active bleeding.  Patient recently had a toenail removed, and reports excessive bleeding post procedure requiring Surgicel and pressure dressing.  He denies other episodes of poor clotting, bleeding, or easy bruising. He only takes ASA 81mg, denies other forms of anticoagulation/antiplatelet.     Pt monitored in SICU, s/p 4u PRBCs, 1plt, 1FFP. Seen by heme, no clear bleeding diathesis. Pt in good spirits, no complaints. BM yesterday and today after meds, usually goes qod.      Allergies  apple (Vomiting)  penicillin (Hives)    HOME MEDICATIONS:   ASA 81  lip 40  carvedilol 6.25 bid  lisinopril 2.5    MEDICATIONS  (STANDING):  acetaminophen     Tablet .. 975 milliGRAM(s) Oral every 6 hours  atorvastatin 40 milliGRAM(s) Oral at bedtime  bisacodyl 5 milliGRAM(s) Oral at bedtime  carvedilol 6.25 milliGRAM(s) Oral every 12 hours  heparin   Injectable 5000 Unit(s) SubCutaneous every 8 hours  pantoprazole    Tablet 40 milliGRAM(s) Oral before breakfast  polyethylene glycol 3350 17 Gram(s) Oral daily  senna 2 Tablet(s) Oral at bedtime  sodium chloride 1 Gram(s) Oral every 12 hours    MEDICATIONS  (PRN):  oxyCODONE    IR 5 milliGRAM(s) Oral every 4 hours PRN Moderate Pain (4 - 6)  oxyCODONE    IR 10 milliGRAM(s) Oral every 4 hours PRN Severe Pain (7 - 10)      PAST MEDICAL & SURGICAL HISTORY:  Guillain-[place] syndrome  HTN (hypertension)  HLD (hyperlipidemia)  CVA (cerebral vascular accident)  Renal hematoma left, 2017, s/p IR angio with no sign of bleed  h/o Tracheostomy, acute management    SOCIAL HISTORY:  disabled, 24/7 HHAs, cannot walk, WC with assist of HHAs  daughter Josey lives nearby, never   no tob/alcohol/drugs  [employer] x2    FAMILY HISTORY:  [x ] No pertinent family history in first degree relatives     REVIEW OF SYSTEMS:  CONSTITUTIONAL: No fever, weight loss, or fatigue  EYES: No eye pain, visual disturbances, or discharge  ENMT:  No difficulty hearing, tinnitus, vertigo; No sinus or throat pain  NECK: No pain or stiffness  BREASTS: No pain, masses, or nipple discharge  RESPIRATORY: No cough, wheezing, chills or hemoptysis; No shortness of breath  CARDIOVASCULAR: No chest pain, palpitations, dizziness, or leg swelling  GASTROINTESTINAL:  No nausea, vomiting, or hematemesis; No diarrhea or constipation. No melena or hematochezia.  GENITOURINARY: No dysuria, frequency, hematuria, or incontinence  NEUROLOGICAL: No headaches, memory loss, loss of strength, numbness, or tremors  SKIN: No itching, burning, rashes, or lesions   LYMPH NODES: No enlarged glands  ENDOCRINE: No heat or cold intolerance; No hair loss  MUSCULOSKELETAL: No muscle or back pain  PSYCHIATRIC: No depression, anxiety, mood swings, or difficulty sleeping  HEME/LYMPH: No easy bruising, or bleeding gums  ALLERGY AND IMMUNOLOGIC: No hives or eczema  [  ] All other ROS negative  [  ] Unable to obtain due to poor mental status    Vital Signs Last 24 Hrs  T(C): 36.8 (01 Jun 2022 13:44), Max: 37.5 (01 Jun 2022 08:00)  T(F): 98.3 (01 Jun 2022 13:44), Max: 99.5 (01 Jun 2022 08:00)  HR: 94 (01 Jun 2022 13:44) (94 - 119)  BP: 130/79 (01 Jun 2022 13:44) (116/68 - 135/91)  BP(mean): 103 (01 Jun 2022 11:45) (83 - 103)  RR: 18 (01 Jun 2022 13:44) (15 - 26)  SpO2: 100% (01 Jun 2022 13:44) (100% - 100%)    PHYSICAL EXAM:  GENERAL: NAD, resting in bed,   HEAD:  Atraumatic, Normocephalic  EYES: disconjugate gaze  ENMT: Moist mucous membranes  NECK: Supple, No JVD  RESPIRATORY: Clear to auscultation bilaterally; No rales, rhonchi, wheezing, or rubs  CARDIOVASCULAR: Regular rate and rhythm; No murmurs, rubs, or gallops  GASTROINTESTINAL: Soft, bloated Nondistended; Bowel sounds present  GENITOURINARY: Not examined  EXTREMITIES:  2+ Peripheral Pulses, No clubbing, cyanosis, or edema  NERVOUS SYSTEM:  Alert & Oriented X3; contracted fingers bilaterally, able to lift arms above head bilaterally; BLE weakness  HEME/LYMPH: No lymphadenopathy noted  SKIN: No rashes or lesions  PSYCH: calm, appropriate    LABS:                        8.4    11.38 )-----------( 221      ( 01 Jun 2022 05:30 )             27.1     06-01    133<L>  |  100  |  22  ----------------------------<  92  5.0   |  23  |  1.10    Ca    8.7      01 Jun 2022 05:30  Phos  2.3     06-01  Mg     1.90     06-01    RADIOLOGY & ADDITIONAL STUDIES:  LUNGS AND LARGE AIRWAYS: Patent central airways. Sequelae of prior   tracheostomy. Basilar dependent atelectasis. 3 mm nodule of the lingula,   image 204 series 3.  PLEURA: Trace bilateral pleural effusions. No pneumothorax  VESSELS: Normal caliber of the thoracic aorta and main pulmonary artery.  HEART: Heart size is within normal limits. Trace pericardial fluid.  MEDIASTINUM AND KILEY: Small volume nodes. Esophagus is underdistended.  CHEST WALL AND LOWER NECK: No chest wall hematoma. Gynecomastia.   Visualized thyroid is unremarkable. Sequelae of prior tracheostomy.    ABDOMEN AND PELVIS:  LIVER: Enlarged, 19 cm in craniocaudal dimension.  BILE DUCTS: No biliary distention  GALLBLADDER: Contracted  SPLEEN: Normal size  PANCREAS: No main ductal dilatation  ADRENALS: Unremarkable  KIDNEYS/URETERS: Large right subcapsular hematoma with adjacent pararenal   space retroperitoneal hemorrhage are stable to minimally increased in   size. Subcapsular hematoma measures approximately 8.5 x 2.8 cm,   previously 7.6 x 2.8 cm remeasured. Adjacent retroperitoneal hematoma   measures 6.1 x 10.9 x 14.6 cm, previously 5.6 x 9.8 x 13.6 cm remeasured.   Significant streak artifact limits evaluation. Within the limitations of   this study, there is no evidence of active extravasation. Correlate with   hemodynamics and hematocrit to guide further management.    Compression of the right renal parenchyma redemonstrated. Retained right   collecting system contrast suggests ongoing right renal dysfunction.    No left hydronephrosis. 1.8 cm indeterminate left renal lesion   redemonstrated which can be further characterized with abdominal MRI when   feasible. Nonobstructing renal calculi bilaterally.    BLADDER: Park catheter.  REPRODUCTIVE ORGANS: Prostate size within normal limits.    BOWEL: Small hiatal hernia. No small bowel distention. Slight air   distention of the sigmoid colon and narrowing at the rectosigmoid region,   nonspecific.  PERITONEUM: Increased small complex ascites, particularly in the right   lower quadrant, in the interim since 5/27/2022. Complex fluid in the   inguinal canals was noted on 5/27/2022 as well.  VESSELS: No abdominal aortic aneurysm. IVC is compressed secondary to   right retroperitoneal hematoma, similar to prior.  RETROPERITONEUM/LYMPH NODES: Right retroperitoneal hematoma as described   above.  ABDOMINAL WALL: Abdominal wall soft tissue edema. Complex fluid within   the inguinal canals redemonstrated, right greater than left, similar to   5/27/2022.  BONES: Degenerative changes.    IMPRESSION:  Large right subcapsular hematoma with adjacent pararenal space   retroperitoneal hemorrhage are stable to minimally increased in size   since 5/27/2022. Significant streak artifact limits evaluation. Within   the limitations of this study, there is no evidence of active   extravasation. Correlate with hemodynamics and hematocrit to guide   further management.    Increased small complex ascites, particularly in the right lower   quadrant, in the interim since 5/27/2022.    Compression of the right renal parenchyma redemonstrated. Retained right   collecting system contrast suggests ongoing right renal dysfunction.    1.8 cm indeterminate left renal lesion redemonstrated which can be   further characterized with abdominal MRI when feasible.    Trace pleural effusions. Bibasilar dependent atelectasis. 3 mm lingular   nodule.    EKG:   Personally Reviewed:  [ ] YES     Imaging:   Personally Reviewed:  [ ] YES               Consultant(s) notes reviewed:    Care Discussed with Consultant(s)/Other Providers: urology re overall care

## 2022-06-01 NOTE — CONSULT NOTE ADULT - PROBLEM SELECTOR RECOMMENDATION 9
a/w large R subcapsular hematoma, monitored in SICU, s/p 4u PRBCs, 1plt, 1FFP. Seen by heme, no clear bleeding diathesis.   - f/u H&H

## 2022-06-01 NOTE — PROGRESS NOTE ADULT - ASSESSMENT
45 yo M h/o Guillain barre, CVA, bedbound and no movement of lower extremities, and Left renal subcapsular hematoma 9/2017, presented to the ED 5/27/2022 with right sided pain in the lower abdomen, flank and back x 2 days, N/V.  Reports little to no urge to void over the past 24 hours.  Home attendant at the bedside confirms patient has been oliguric over the past 24 hours.  Denies hematuria or dysuria.  Denies fevers. Patient was hypotensive with BP in the 90s with a normal heart rate.  Hgb 11.2, HCt 34.8.  CT performed showing a right renal subcapsular hematoma.  Patient became tachycardic, and more hypotensive with SBP in the 70s.  Repeat HGB 10.4 HCT 32.1. Patient transfused and admitted to SICU.  Patient had a left renal subcapsular hematoma 9/2017, requiring transfusions at that time, and ultimately IR intervention without findings of active bleeding.  Patient recently had a toenail removed, and reports excessive bleeding post procedure requiring Surgicel and pressure dressing.  He denies other episodes of poor clotting, bleeding, or easy bruising. He only takes ASA 81mg, denies other forms of anticoagulation/antiplatelet.     5/27: admitted to SICU for hemodynamic monitoring in setting of large R subcapsular hematoma, transfused 3u pRBC  5/28: transfused 1u pRBC, 1 FFP, 1 platelets; heme-onc consulted, recommend chest CT  5/29: Hct continues to downtrending, IR consult pending, CTA no active extrav, IR with no intervention plans, 1u PRBC  5/30: Doing well, pain controlled  5/31: Had bowel movement, feels well, denies pain   6/1: Listed for the floor, H/H stable, denies pain, NV, having bowel function    Plan:  -monitor UO  -monitor HCT  -monitor abdominal exam  -f/u heme  -pain control  -Plan for TOV today  -Regular diet  -appreciate SICU care  -Plan discussed with attending

## 2022-06-01 NOTE — CONSULT NOTE ADULT - REASON FOR ADMISSION
Hemorrhage, right renal subcapsular hematoma

## 2022-06-01 NOTE — CONSULT NOTE ADULT - ASSESSMENT
46M unable to walk, 24/7 HHAs, h/o Guillain barre, CVA, bedbound and no movement of lower extremities, and Left renal subcapsular hematoma 9/2017, a/w large R subcapsular hematoma, JUAN LUIS. Pt monitored in SICU, s/p 4u PRBCs, 1plt, 1FFP. Seen by heme, no clear bleeding diathesis.

## 2022-06-01 NOTE — PROGRESS NOTE ADULT - ATTENDING COMMENTS
I agree with the detailed interval history, physical, and plan, which I have reviewed and edited where appropriate'; also agree with notes/assessment with my team on service.  I have personally examined the patient.  I was physically present for the key portions of the evaluation and management (E/M) service provided.  I reviewed all the pertinent data.  The patient is a critical care patient with life threatening hemodynamic and metabolic instability in SICU.  The SICU team has a constant risk benefit analyzes discussion and coordinating care with the primary team and all consultants.   The patient is in SICU with the chief complaint and diagnosis mentioned in the note.   The plan will be specified in the note.  46 year old male with a PMHx of HTN, HLD, Guillain barre, CVA, with a large right subcapsular hematoma with adjacent pararenal space retroperitoneal hemorrhage in SICU secondary to acute blood loss anemia.  EXAM  NEUROLOGY  Exam: no acute distress.  RESPIRATORY  Exam: clear  CARDIOVASCULAR  Exam: Regular rate and rhythm.  GI/NUTRITION  Exam: Abdomen soft, Non-tender,    PLAN:  NEUROLOGY: Guillain [place] syndrome  - Tylenol and Oxycodone   RESPIRATORY:  - Maintain O2 saturation >92%  CARDIOVASCULAR:  - Continue with home dose Coreg  - Keep MAP >65  GI / NUTRITION:  - Regular diet  - GI prophylaxis with Protonix 40mg QD  -RENAL / GENITOURINARY:  - Indwelling menjivar catheter  HEMATOLOGIC:  - - IR consult for possible embolization  - Transfuse PRN  - VTE prophylaxis with Heparin subcutaneous  INFECTIOUS DISEASE:  -- Continue to monitor off antibiotics  ENDOCRINOLOGY:  - Continue to monitor glucose on BMP (goal 140-180)  Disposition: SICU
Critical Care Dx    S37.011D Hematoma of right kidney, subsequent encounter   -will require further imaging with MRI in the future to r/o underlying mass. Will need metastatic w/u as outpatient   D62 Acute blood loss anemia   -resusctiated well while maintaining stable vitals  -crit checks stable   -holding DVT prophylaxis until at least 24 hours out from crit checks  N17.9 JUAN LUIS (acute kidney injury)   -compression form hematoma may be causing poor perfusion  -monitor BMP    The patient is a critical care patient with potential life threatening hemodynamic instability in SICU.  I have personally interviewed and examined the patient, reviewed data and laboratory tests/x-rays and all pertinent electronic images.  The SICU team has a constant risk benefit analyzes discussion with the primary team, all consultants, House Staff and PA's on all decisions.   Time involved in performance of separately billable procedures was not counted toward my critical care time. There is no overlap.    I have personally provided 60 minutes of critical care time concurrently with the resident/fellow. This time excludes time spent on separate procedures and time spent teaching. I have reviewed the resident's/fellow's documentation and agree with the assessment and plan of care.  I was physically present for the key portions of the evaluation and management (E/M) service provided.      Shawn Odell MD  Acute and Critical Care Surgery
I agree with the detailed interval history, physical, and plan, which I have reviewed and edited where appropriate'; also agree with notes/assessment with my team on service.  I have personally examined the patient.  I was physically present for the key portions of the evaluation and management (E/M) service provided.  I reviewed all the pertinent data.  The patient is a critical care patient with life threatening hemodynamic and metabolic instability in SICU.  The SICU team has a constant risk benefit analyzes discussion and coordinating care with the primary team and all consultants.   The patient is in SICU with the chief complaint and diagnosis mentioned in the note.   The plan will be specified in the note.  46y Male pmh CVA, R Subcapsular hematoma, Guillan barre and now with a large right anterior pararenal space hematoma with retroperitoneal hemorrhage with cute blood loss anemia in SICU.  EXAM  NEUROLOGY  Exam: Normal, NAD  RESPIRATORY  Exam: Lungs clear   CARDIOVASCULAR  Exam: Regular rate and rhythm   GI/NUTRITION  Exam: Abdomen soft, Non-tender, moderately distended  PLAN:   Neurologic: sp CVA, Guillain barre, sp CVA   Tylenol PRN, oxycodone PRN   Respiratory:   - no acute issues   Cardiovascular: HTN, HLD, Hemorrhagic shock/acute blood loss anemia  - Tachycardia improving  -Gastrointestinal/Nutrition:   -NPO  - Protonix for GI ppx  Renal/Genitourinary: Subcapsular R renal hematoma with RP bleed. CTA negative for active bleed.  - Continue menjivar   Hematologic:   - Holding chemical DVT ppx --will restart this afternoon if CBC stable   Infectious Disease:   - Monitor off antibiotics  Endocrine:   - no active issues   Disposition: SICU
R renal subcapsular hematoma  a.  CTA to be reviewed  b.  Discuss with IR and urology re: management    Acute posthemorrhage anemia  a.  Transfuse pRBC prn  b.  Serial Hgb    JUAN LUIS  a.  Rising creatinine  b.  Administer 1L IVF  c.  Monitor I and 0    At risk for malnutrition  a.  NPO
I agree with the detailed interval history, physical, and plan, which I have reviewed and edited where appropriate'; also agree with notes/assessment with my team on service.  I have personally examined the patient.  I was physically present for the key portions of the evaluation and management (E/M) service provided.  I reviewed all the pertinent data.  The patient is a critical care patient with life threatening hemodynamic and metabolic instability in SICU.  The SICU team has a constant risk benefit analyzes discussion and coordinating care with the primary team and all consultants.   The patient is in SICU with the chief complaint and diagnosis mentioned in the note.   The plan will be specified in the note.  46 year old male with a PMHx of HTN, HLD, Guillain barre, CVA, with a large right anterior pararenal space hematoma with retroperitoneal hemorrhage in SICU.  NEUROLOGY  Exam: Normal, in no acute distress.  RESPIRATORY  Exam: clear  CARDIOVASCULAR  Exam: Regular rate and rhythm.  GI/NUTRITION  Exam: Abdomen soft, Non-tender, Non-distended.    PLAN:  NEUROLOGY: Guillain [place] syndrome  - Tylenol and Oxycodone PRN for pain control  RESPIRATORY:  - Maintain O2 saturation >92%  CARDIOVASCULAR:  - Keep MAP >65  GI / NUTRITION:  - GI prophylaxis with Protonix 40mg QD  RENAL / GENITOURINARY:  - Remove menjivar catheter  HEMATOLOGIC:  - VTE prophylaxis with Heparin subcutaneous  INFECTIOUS DISEASE:  - Continue to monitor off antibiotics  ENDOCRINOLOGY:  -- Continue to monitor glucose on BMP (goal 140-180)  Disposition: Floor

## 2022-06-01 NOTE — PROGRESS NOTE ADULT - ASSESSMENT
Patient is a 46 year old male with a PMHx of HTN, HLD, Guillain barre, CVA, bedbound (no movement of lower extremities) and left renal subcapsular hematoma (9/2017) who presented to the ED with right sided pain in the lower abdomen, flank and back x2 days.  CT Abdomen / Pelvis performed showing large right subcapsular renal hematoma.  Large right anterior pararenal space hematoma with retroperitoneal hemorrhage tracking into the right lower abdomen, pelvis and a right inguinal hernia.  Increased size of indeterminate 1.8 cm left interpolar renal lesion, previously demonstrating enhancement and measuring 1.3 cm, suspicious for neoplasm.  I R consulted, however no intervention performed.  CTA Abdomen / Pelvis performed showing large right subcapsular hematoma with adjacent pararenal space retroperitoneal hemorrhage are stable to minimally increased in size since 5/27/2022.  Significant streak artifact limits evaluation.  Within the limitations of this study, there is no evidence of active extravasation.  Increased small complex ascites, particularly in the right lower quadrant, in the interim since 5/27/2022.  Compression of the right renal parenchyma re-demonstrated.  Retained right collecting system contrast suggests ongoing right renal dysfunction.  1.8 cm indeterminate left renal lesion redemonstrated which can be further characterized with abdominal MRI when feasible.  Trace pleural effusions.  Bibasilar dependent atelectasis.  3 mm lingular nodule.      PLAN:    NEUROLOGY:  - Hx of Guillain [place] syndrome  - Tylenol and Oxycodone PRN for pain control    RESPIRATORY:  - No active issues  - Saturating well on room air  - Continuous pulse oximetry  - Maintain O2 saturation >92%    CARDIOVASCULAR:  - Normotensive  - No pressor requirements  - Continue with home dose Coreg  - Keep MAP >65    GI / NUTRITION:  - Regular diet  - GI prophylaxis with Protonix 40mg QD  - Bowel regimen with Dulcolax, Senna and Miralax    RENAL / GENITOURINARY:  - Remove menjivar catheter  - Patient due to void in 8 hours  - Continue with salt tabs 1g q12 hours for hyponatremia  - Monitor electrolytes and replete PRN  - Continue to monitor strict ins and outs q1 hour    HEMATOLOGIC:  - Hemoglobin and hematocrit slowly downtrending  - Transfuse PRN  - IR consulted for possible embolization - no intervention at this time  - VTE prophylaxis with Heparin subcutaneous    INFECTIOUS DISEASE:  - Currently afebrile with leukocytosis of 11.38  - Continue to monitor off antibiotics    ENDOCRINOLOGY:  - No active issues  - Continue to monitor glucose on BMP (goal 140-180)      Disposition: Floor    --------------------------------------------------------------------------------------

## 2022-06-02 ENCOUNTER — TRANSCRIPTION ENCOUNTER (OUTPATIENT)
Age: 47
End: 2022-06-02

## 2022-06-02 LAB
ANION GAP SERPL CALC-SCNC: 11 MMOL/L — SIGNIFICANT CHANGE UP (ref 7–14)
BLD GP AB SCN SERPL QL: NEGATIVE — SIGNIFICANT CHANGE UP
BUN SERPL-MCNC: 20 MG/DL — SIGNIFICANT CHANGE UP (ref 7–23)
CALCIUM SERPL-MCNC: 8.7 MG/DL — SIGNIFICANT CHANGE UP (ref 8.4–10.5)
CHLORIDE SERPL-SCNC: 100 MMOL/L — SIGNIFICANT CHANGE UP (ref 98–107)
CO2 SERPL-SCNC: 23 MMOL/L — SIGNIFICANT CHANGE UP (ref 22–31)
CREAT SERPL-MCNC: 1.03 MG/DL — SIGNIFICANT CHANGE UP (ref 0.5–1.3)
EGFR: 91 ML/MIN/1.73M2 — SIGNIFICANT CHANGE UP
GLUCOSE SERPL-MCNC: 87 MG/DL — SIGNIFICANT CHANGE UP (ref 70–99)
HCT VFR BLD CALC: 27.1 % — LOW (ref 39–50)
HGB BLD-MCNC: 8.6 G/DL — LOW (ref 13–17)
MAGNESIUM SERPL-MCNC: 1.8 MG/DL — SIGNIFICANT CHANGE UP (ref 1.6–2.6)
MCHC RBC-ENTMCNC: 29.1 PG — SIGNIFICANT CHANGE UP (ref 27–34)
MCHC RBC-ENTMCNC: 31.7 GM/DL — LOW (ref 32–36)
MCV RBC AUTO: 91.6 FL — SIGNIFICANT CHANGE UP (ref 80–100)
NRBC # BLD: 0 /100 WBCS — SIGNIFICANT CHANGE UP
NRBC # FLD: 0 K/UL — SIGNIFICANT CHANGE UP
PHOSPHATE SERPL-MCNC: 2.6 MG/DL — SIGNIFICANT CHANGE UP (ref 2.5–4.5)
PLATELET # BLD AUTO: 269 K/UL — SIGNIFICANT CHANGE UP (ref 150–400)
POTASSIUM SERPL-MCNC: 4.4 MMOL/L — SIGNIFICANT CHANGE UP (ref 3.5–5.3)
POTASSIUM SERPL-SCNC: 4.4 MMOL/L — SIGNIFICANT CHANGE UP (ref 3.5–5.3)
RBC # BLD: 2.96 M/UL — LOW (ref 4.2–5.8)
RBC # FLD: 15.3 % — HIGH (ref 10.3–14.5)
RH IG SCN BLD-IMP: POSITIVE — SIGNIFICANT CHANGE UP
SODIUM SERPL-SCNC: 134 MMOL/L — LOW (ref 135–145)
WBC # BLD: 11.47 K/UL — HIGH (ref 3.8–10.5)
WBC # FLD AUTO: 11.47 K/UL — HIGH (ref 3.8–10.5)

## 2022-06-02 PROCEDURE — 99232 SBSQ HOSP IP/OBS MODERATE 35: CPT

## 2022-06-02 PROCEDURE — 99231 SBSQ HOSP IP/OBS SF/LOW 25: CPT

## 2022-06-02 RX ORDER — SENNA PLUS 8.6 MG/1
2 TABLET ORAL
Qty: 0 | Refills: 0 | DISCHARGE
Start: 2022-06-02

## 2022-06-02 RX ORDER — ASPIRIN/CALCIUM CARB/MAGNESIUM 324 MG
81 TABLET ORAL DAILY
Refills: 0 | Status: DISCONTINUED | OUTPATIENT
Start: 2022-06-02 | End: 2022-06-03

## 2022-06-02 RX ORDER — ACETAMINOPHEN 500 MG
3 TABLET ORAL
Qty: 0 | Refills: 0 | DISCHARGE
Start: 2022-06-02

## 2022-06-02 RX ORDER — POLYETHYLENE GLYCOL 3350 17 G/17G
17 POWDER, FOR SOLUTION ORAL
Qty: 0 | Refills: 0 | DISCHARGE
Start: 2022-06-02

## 2022-06-02 RX ADMIN — Medication 975 MILLIGRAM(S): at 13:32

## 2022-06-02 RX ADMIN — ATORVASTATIN CALCIUM 40 MILLIGRAM(S): 80 TABLET, FILM COATED ORAL at 22:24

## 2022-06-02 RX ADMIN — HEPARIN SODIUM 5000 UNIT(S): 5000 INJECTION INTRAVENOUS; SUBCUTANEOUS at 15:23

## 2022-06-02 RX ADMIN — Medication 975 MILLIGRAM(S): at 06:15

## 2022-06-02 RX ADMIN — POLYETHYLENE GLYCOL 3350 17 GRAM(S): 17 POWDER, FOR SOLUTION ORAL at 13:02

## 2022-06-02 RX ADMIN — HEPARIN SODIUM 5000 UNIT(S): 5000 INJECTION INTRAVENOUS; SUBCUTANEOUS at 06:15

## 2022-06-02 RX ADMIN — Medication 975 MILLIGRAM(S): at 01:13

## 2022-06-02 RX ADMIN — HEPARIN SODIUM 5000 UNIT(S): 5000 INJECTION INTRAVENOUS; SUBCUTANEOUS at 22:24

## 2022-06-02 RX ADMIN — PANTOPRAZOLE SODIUM 40 MILLIGRAM(S): 20 TABLET, DELAYED RELEASE ORAL at 06:15

## 2022-06-02 RX ADMIN — Medication 975 MILLIGRAM(S): at 13:02

## 2022-06-02 RX ADMIN — SENNA PLUS 2 TABLET(S): 8.6 TABLET ORAL at 22:24

## 2022-06-02 RX ADMIN — CARVEDILOL PHOSPHATE 6.25 MILLIGRAM(S): 80 CAPSULE, EXTENDED RELEASE ORAL at 18:32

## 2022-06-02 RX ADMIN — Medication 81 MILLIGRAM(S): at 13:02

## 2022-06-02 RX ADMIN — Medication 5 MILLIGRAM(S): at 22:24

## 2022-06-02 RX ADMIN — CARVEDILOL PHOSPHATE 6.25 MILLIGRAM(S): 80 CAPSULE, EXTENDED RELEASE ORAL at 06:15

## 2022-06-02 RX ADMIN — SODIUM CHLORIDE 1 GRAM(S): 9 INJECTION INTRAMUSCULAR; INTRAVENOUS; SUBCUTANEOUS at 06:15

## 2022-06-02 NOTE — PROGRESS NOTE ADULT - ASSESSMENT
45 yo M h/o Guillain barre, CVA, bedbound and no movement of lower extremities, and Left renal subcapsular hematoma 9/2017, presented to the ED 5/27/2022 with right sided pain in the lower abdomen, flank and back x 2 days, N/V.  Reports little to no urge to void over the past 24 hours.  Home attendant at the bedside confirms patient has been oliguric over the past 24 hours.  Denies hematuria or dysuria.  Denies fevers. Patient was hypotensive with BP in the 90s with a normal heart rate.  Hgb 11.2, HCt 34.8.  CT performed showing a right renal subcapsular hematoma.  Patient became tachycardic, and more hypotensive with SBP in the 70s.  Repeat HGB 10.4 HCT 32.1. Patient transfused and admitted to SICU.  Patient had a left renal subcapsular hematoma 9/2017, requiring transfusions at that time, and ultimately IR intervention without findings of active bleeding.  Patient recently had a toenail removed, and reports excessive bleeding post procedure requiring Surgicel and pressure dressing.  He denies other episodes of poor clotting, bleeding, or easy bruising. He only takes ASA 81mg, denies other forms of anticoagulation/antiplatelet.     5/27: admitted to SICU for hemodynamic monitoring in setting of large R subcapsular hematoma, transfused 3u pRBC  5/28: transfused 1u pRBC, 1 FFP, 1 platelets; heme-onc consulted, recommend chest CT  5/29: Hct continues to downtrending, IR consult pending, CTA no active extrav, IR with no intervention plans, 1u PRBC  5/30: Doing well, pain controlled  5/31: Had bowel movement, feels well, denies pain   6/1: H/H stable, denies pain, NV, having bowel function, transferred to floor in stable condition, successful TOV  6/2: H/H stable, feels great    Plan:  -AM CBC reviewed  -f/u BMP  -monitor abdominal exam  -f/u heme  -f/u medicine  -f/u case management  -? restart ASA  -OOB to WC  -d/c home later today

## 2022-06-02 NOTE — DISCHARGE NOTE PROVIDER - HOSPITAL COURSE
45 yo M h/o Guillain barre, CVA, bedbound and no movement of lower extremities, and Left renal subcapsular hematoma 9/2017, presented to the ED 5/27/2022 with right sided pain in the lower abdomen, flank and back x 2 days, N/V.  Reports little to no urge to void over the past 24 hours.  Home attendant at the bedside confirms patient has been oliguric over the past 24 hours.  Denies hematuria or dysuria.  Denies fevers. Patient was hypotensive with BP in the 90s with a normal heart rate.  Hgb 11.2, HCt 34.8.  CT performed showing a right renal subcapsular hematoma.  Patient became tachycardic, and more hypotensive with SBP in the 70s.  Repeat HGB 10.4 HCT 32.1. Patient transfused and admitted to SICU.  Patient had a left renal subcapsular hematoma 9/2017, requiring transfusions at that time, and ultimately IR intervention without findings of active bleeding.  Patient recently had a toenail removed, and reports excessive bleeding post procedure requiring Surgicel and pressure dressing.  He denies other episodes of poor clotting, bleeding, or easy bruising. He only takes ASA 81mg, denies other forms of anticoagulation/antiplatelet.     5/27: admitted to SICU for hemodynamic monitoring in setting of large R subcapsular hematoma, transfused 3u pRBC  5/28: transfused 1u pRBC, 1 FFP, 1 platelets; heme-onc consulted, recommend chest CT  5/29: Hct continues to downtrending, IR consult pending, CTA no active extrav, IR with no intervention plans, 1u PRBC  5/30: Doing well, pain controlled  5/31: Had bowel movement, feels well, denies pain   6/1: H/H stable, denies pain, NV, having bowel function, transferred to floor in stable condition, successful TOV  6/2: H/H stable, feels great, discharged to f/u with Dr. Holly 45 yo M h/o Guillain barre, CVA, bedbound and no movement of lower extremities, and Left renal subcapsular hematoma 9/2017, presented to the ED 5/27/2022 with right sided pain in the lower abdomen, flank and back x 2 days, N/V.  Reports little to no urge to void over the past 24 hours.  Home attendant at the bedside confirms patient has been oliguric over the past 24 hours.  Denies hematuria or dysuria.  Denies fevers. Patient was hypotensive with BP in the 90s with a normal heart rate.  Hgb 11.2, HCt 34.8.  CT performed showing a right renal subcapsular hematoma.  Patient became tachycardic, and more hypotensive with SBP in the 70s.  Repeat HGB 10.4 HCT 32.1. Patient transfused and admitted to SICU.  Patient had a left renal subcapsular hematoma 9/2017, requiring transfusions at that time, and ultimately IR intervention without findings of active bleeding.  Patient recently had a toenail removed, and reports excessive bleeding post procedure requiring Surgicel and pressure dressing.  He denies other episodes of poor clotting, bleeding, or easy bruising. He only takes ASA 81mg, denies other forms of anticoagulation/antiplatelet.     5/27: admitted to SICU for hemodynamic monitoring in setting of large R subcapsular hematoma, transfused 3u pRBC  5/28: transfused 1u pRBC, 1 FFP, 1 platelets; heme-onc consulted, recommend chest CT  5/29: Hct continues to downtrending, IR consult pending, CTA no active extravasation, IR with no intervention plans, 1u PRBC transfused  5/30: Doing well, pain controlled  5/31: Had bowel movement, feels well, denies pain   6/1: H/H stable, denies pain, NV, having bowel function, transferred to floor in stable condition, successful TOV  6/2: H/H stable, feels great, discharged to f/u with Dr. Holly, to f/u with oncology at [facility] w/ MRI head as output, no further inpt oncology w/u needed.  Home care aides reinstated. 45 yo M h/o Guillain barre, CVA, bedbound and no movement of lower extremities, and Left renal subcapsular hematoma 9/2017, presented to the ED 5/27/2022 with right sided pain in the lower abdomen, flank and back x 2 days, N/V.  Reports little to no urge to void over the past 24 hours.  Home attendant at the bedside confirms patient has been oliguric over the past 24 hours.  Denies hematuria or dysuria.  Denies fevers. Patient was hypotensive with BP in the 90s with a normal heart rate.  Hgb 11.2, HCt 34.8.  CT performed showing a right renal subcapsular hematoma.  Patient became tachycardic, and more hypotensive with SBP in the 70s.  Repeat HGB 10.4 HCT 32.1. Patient transfused and admitted to SICU.  Patient had a left renal subcapsular hematoma 9/2017, requiring transfusions at that time, and ultimately IR intervention without findings of active bleeding.  Patient recently had a toenail removed, and reports excessive bleeding post procedure requiring Surgicel and pressure dressing.  He denies other episodes of poor clotting, bleeding, or easy bruising. He only takes ASA 81mg, denies other forms of anticoagulation/antiplatelet.     5/27: admitted to SICU for hemodynamic monitoring in setting of large R subcapsular hematoma, transfused 3u pRBC  5/28: transfused 1u pRBC, 1 FFP, 1 platelets; heme-onc consulted, recommend chest CT  5/29: Hct continues to downtrending, IR consult pending, CTA no active extravasation, IR with no intervention plans, 1u PRBC transfused  5/30: Doing well, pain controlled  5/31: Had bowel movement, feels well, denies pain   6/1: H/H stable, denies pain, NV, having bowel function, transferred to floor in stable condition, successful TOV  6/2: H/H stable, feels great, discharged to f/u with Dr. Holly, to f/u with oncology Dr. Conway at [facility] June 15th at 11am, w/ MRI head as output, no further inpt oncology w/u needed.  Home care aides reinstated. 45 yo M h/o Guillain barre, CVA, bedbound and no movement of lower extremities, and Left renal subcapsular hematoma 9/2017, presented to the ED 5/27/2022 with right sided pain in the lower abdomen, flank and back x 2 days, N/V.  Reports little to no urge to void over the past 24 hours.  Home attendant at the bedside confirms patient has been oliguric over the past 24 hours.  Denies hematuria or dysuria.  Denies fevers. Patient was hypotensive with BP in the 90s with a normal heart rate.  Hgb 11.2, HCt 34.8.  CT performed showing a right renal subcapsular hematoma.  Patient became tachycardic, and more hypotensive with SBP in the 70s.  Repeat HGB 10.4 HCT 32.1. Patient transfused and admitted to SICU.  Patient had a left renal subcapsular hematoma 9/2017, requiring transfusions at that time, and ultimately IR intervention without findings of active bleeding.  Patient recently had a toenail removed, and reports excessive bleeding post procedure requiring Surgicel and pressure dressing.  He denies other episodes of poor clotting, bleeding, or easy bruising. He only takes ASA 81mg, denies other forms of anticoagulation/antiplatelet.     5/27: admitted to SICU for hemodynamic monitoring in setting of large R subcapsular hematoma, transfused 3u pRBC  5/28: transfused 1u pRBC, 1 FFP, 1 platelets; heme-onc consulted, recommend chest CT  5/29: Hct continues to downtrending, IR consult pending, CTA no active extravasation, IR with no intervention plans, 1u PRBC transfused  5/30: Doing well, pain controlled  5/31: Had bowel movement, feels well, denies pain   6/1: H/H stable, denies pain, NV, having bowel function, transferred to floor in stable condition, successful TOV  6/2: H/H stable, feels great  6/3: Pt feels great, looking forward to going home, discharged to f/u with Dr. Holly, to f/u with oncology Dr. Conway at [facility] June 15th at 11am, w/ MRI head as output, no further inpt oncology w/u needed.  Home care aides reinstated.

## 2022-06-02 NOTE — PROGRESS NOTE ADULT - PROBLEM SELECTOR PLAN 3
CT no active extravasation  management per urology. CT no active extravasation  management per urology.  onc rec MRI brain to r/o underlying pathology, onc scheduled f/u appt with Dr. Conway at [facility].

## 2022-06-02 NOTE — PROGRESS NOTE ADULT - PROBLEM SELECTOR PLAN 1
a/w large R subcapsular hematoma, monitored in SICU, s/p 4u PRBCs, 1plt, 1FFP. Seen by heme, no clear bleeding diathesis.   - f/u H&H. a/w large R subcapsular hematoma, monitored in SICU, s/p 4u PRBCs, 1plt, 1FFP. Seen by heme, no clear bleeding diathesis.   - H&H stabilized

## 2022-06-02 NOTE — DISCHARGE NOTE NURSING/CASE MANAGEMENT/SOCIAL WORK - NSDCPEFALRISK_GEN_ALL_CORE
For information on Fall & Injury Prevention, visit: [email or web address] OR  [email or web address] OR  https://www.cdc.gov/steadi/patient.html

## 2022-06-02 NOTE — PROGRESS NOTE ADULT - SUBJECTIVE AND OBJECTIVE BOX
Overnight events:  None    Subjective:  Pt states he feels great    Objective:    Vital signs  T(C): , Max: 37.8 (06-01-22 @ 18:51)  HR: 91 (06-02-22 @ 06:15)  BP: 129/84 (06-02-22 @ 06:15)  SpO2: 98% (06-02-22 @ 06:15)  Wt(kg): --    Output   Void:   06-01 @ 07:01  -  06-02 @ 07:00  --------------------------------------------------------  IN: 350 mL / OUT: 1775 mL / NET: -1425 mL        Gen  Abd      Labs                        8.6    11.47 )-----------( 269      ( 02 Jun 2022 06:00 )             27.1     01 Jun 2022 05:30    133    |  100    |  22     ----------------------------<  92     5.0     |  23     |  1.10     Ca    8.7        01 Jun 2022 05:30  Phos  2.3       01 Jun 2022 05:30  Mg     1.90      01 Jun 2022 05:30        Urine Cx:   Blood Cx:     Imaging Overnight events:  None    Subjective:  Pt states he feels great    Objective:    Vital signs  T(C): , Max: 37.8 (06-01-22 @ 18:51)  HR: 91 (06-02-22 @ 06:15)  BP: 129/84 (06-02-22 @ 06:15)  SpO2: 98% (06-02-22 @ 06:15)  Wt(kg): --    Output   Void:   06-01 @ 07:01  -  06-02 @ 07:00  --------------------------------------------------------  IN: 350 mL / OUT: 1775 mL / NET: -1425 mL        Gen: NAD  Abd: mildly distended, nontender, no ecchymoses appreciated      Labs                        8.6    11.47 )-----------( 269      ( 02 Jun 2022 06:00 )             27.1

## 2022-06-02 NOTE — DISCHARGE NOTE PROVIDER - NSDCCPCAREPLAN_GEN_ALL_CORE_FT
PRINCIPAL DISCHARGE DIAGNOSIS  Diagnosis: Renal hematoma, right  Assessment and Plan of Treatment: Follow up with Dr. Holly  Call the office if you have fever greater than 101, difficulty urinating, pain not relieved with pain medication, nausea/vomiting.        SECONDARY DISCHARGE DIAGNOSES  Diagnosis: Status post CVA  Assessment and Plan of Treatment:     Diagnosis: HTN (hypertension)  Assessment and Plan of Treatment: Continue current home medications and follow up with your primary care provider      Diagnosis: HLD (hyperlipidemia)  Assessment and Plan of Treatment: Continue current home medications and follow up with your primary care provider       PRINCIPAL DISCHARGE DIAGNOSIS  Diagnosis: Renal hematoma, right  Assessment and Plan of Treatment: Follow up with Dr. Holly  Call the office if you have fever greater than 101, difficulty urinating, pain not relieved with pain medication, nausea/vomiting.  Follow up with Dr. Conway on June 15th at 11am        SECONDARY DISCHARGE DIAGNOSES  Diagnosis: Status post CVA  Assessment and Plan of Treatment: Continue aspirin    Diagnosis: HTN (hypertension)  Assessment and Plan of Treatment: Continue current home medications and follow up with your primary care provider      Diagnosis: HLD (hyperlipidemia)  Assessment and Plan of Treatment: Continue current home medications and follow up with your primary care provider

## 2022-06-02 NOTE — PROGRESS NOTE ADULT - SUBJECTIVE AND OBJECTIVE BOX
[facility] Division of Hospital Medicine  Corine Patricio MD  Pager (M-F, 8A-5P):  In-house pager [phone]; Long-range pager [phone]  Other Times:  Please page Hospitalist in Charge -  In-house pager [phone]    Patient is a 46y old  Male who presents with a chief complaint of Hemorrhage, right renal subcapsular hematoma (02 Jun 2022 08:58)      SUBJECTIVE / OVERNIGHT EVENTS:  ....  ADDITIONAL REVIEW OF SYSTEMS:    MEDICATIONS  (STANDING):  acetaminophen     Tablet .. 975 milliGRAM(s) Oral every 6 hours  aspirin enteric coated 81 milliGRAM(s) Oral daily  atorvastatin 40 milliGRAM(s) Oral at bedtime  bisacodyl 5 milliGRAM(s) Oral at bedtime  carvedilol 6.25 milliGRAM(s) Oral every 12 hours  heparin   Injectable 5000 Unit(s) SubCutaneous every 8 hours  pantoprazole    Tablet 40 milliGRAM(s) Oral before breakfast  polyethylene glycol 3350 17 Gram(s) Oral daily  senna 2 Tablet(s) Oral at bedtime    MEDICATIONS  (PRN):  oxyCODONE    IR 5 milliGRAM(s) Oral every 4 hours PRN Moderate Pain (4 - 6)  oxyCODONE    IR 10 milliGRAM(s) Oral every 4 hours PRN Severe Pain (7 - 10)      CAPILLARY BLOOD GLUCOSE        I&O's Summary    01 Jun 2022 07:01  -  02 Jun 2022 07:00  --------------------------------------------------------  IN: 350 mL / OUT: 1775 mL / NET: -1425 mL    02 Jun 2022 07:01  -  02 Jun 2022 13:00  --------------------------------------------------------  IN: 240 mL / OUT: 275 mL / NET: -35 mL        PHYSICAL EXAM:  Vital Signs Last 24 Hrs  T(C): 37.1 (02 Jun 2022 10:00), Max: 37.8 (01 Jun 2022 18:51)  T(F): 98.7 (02 Jun 2022 10:00), Max: 100.1 (01 Jun 2022 18:51)  HR: 97 (02 Jun 2022 10:00) (91 - 105)  BP: 120/84 (02 Jun 2022 10:00) (120/80 - 135/90)  BP(mean): --  RR: 18 (02 Jun 2022 10:00) (17 - 19)  SpO2: 97% (02 Jun 2022 10:00) (95% - 100%)    GENERAL: NAD, resting in bed,   HEAD:  Atraumatic, Normocephalic  EYES: disconjugate gaze  ENMT: Moist mucous membranes  NECK: Supple, No JVD  RESPIRATORY: Clear to auscultation bilaterally; No rales, rhonchi, wheezing, or rubs  CARDIOVASCULAR: Regular rate and rhythm; No murmurs, rubs, or gallops  GASTROINTESTINAL: Soft, bloated Nondistended; Bowel sounds present  GENITOURINARY: Not examined  EXTREMITIES:  2+ Peripheral Pulses, No clubbing, cyanosis, or edema  NERVOUS SYSTEM:  Alert & Oriented X3; contracted fingers bilaterally, able to lift arms above head bilaterally; BLE weakness  HEME/LYMPH: No lymphadenopathy noted  SKIN: No rashes or lesions  PSYCH: calm, appropriate    LABS:                        8.6    11.47 )-----------( 269      ( 02 Jun 2022 06:00 )             27.1     06-02    134<L>  |  100  |  20  ----------------------------<  87  4.4   |  23  |  1.03    Ca    8.7      02 Jun 2022 06:00  Phos  2.6     06-02  Mg     1.80     06-02    RADIOLOGY & ADDITIONAL TESTS:  Results Reviewed:   Imaging Personally Reviewed:  Electrocardiogram Personally Reviewed:    COORDINATION OF CARE:  Care Discussed with Consultants/Other Providers [Y/N]: urology re overall care  Prior or Outpatient Records Reviewed [Y/N]:   [facility] Division of Hospital Medicine  Corine Patricio MD  Pager (M-F, 8A-5P):  In-house pager [phone]; Long-range pager [phone]  Other Times:  Please page Hospitalist in Charge -  In-house pager [phone]    Patient is a 46y old  Male who presents with a chief complaint of Hemorrhage, right renal subcapsular hematoma (02 Jun 2022 08:58)      SUBJECTIVE / OVERNIGHT EVENTS:  Seen earlier, resting in bed, in good spirits. Eager to go home. No pain, SOB. Regular BMs, voiding normally.  ADDITIONAL REVIEW OF SYSTEMS:    MEDICATIONS  (STANDING):  acetaminophen     Tablet .. 975 milliGRAM(s) Oral every 6 hours  aspirin enteric coated 81 milliGRAM(s) Oral daily  atorvastatin 40 milliGRAM(s) Oral at bedtime  bisacodyl 5 milliGRAM(s) Oral at bedtime  carvedilol 6.25 milliGRAM(s) Oral every 12 hours  heparin   Injectable 5000 Unit(s) SubCutaneous every 8 hours  pantoprazole    Tablet 40 milliGRAM(s) Oral before breakfast  polyethylene glycol 3350 17 Gram(s) Oral daily  senna 2 Tablet(s) Oral at bedtime    MEDICATIONS  (PRN):  oxyCODONE    IR 5 milliGRAM(s) Oral every 4 hours PRN Moderate Pain (4 - 6)  oxyCODONE    IR 10 milliGRAM(s) Oral every 4 hours PRN Severe Pain (7 - 10)      CAPILLARY BLOOD GLUCOSE        I&O's Summary    01 Jun 2022 07:01  -  02 Jun 2022 07:00  --------------------------------------------------------  IN: 350 mL / OUT: 1775 mL / NET: -1425 mL    02 Jun 2022 07:01  -  02 Jun 2022 13:00  --------------------------------------------------------  IN: 240 mL / OUT: 275 mL / NET: -35 mL        PHYSICAL EXAM:  Vital Signs Last 24 Hrs  T(C): 37.1 (02 Jun 2022 10:00), Max: 37.8 (01 Jun 2022 18:51)  T(F): 98.7 (02 Jun 2022 10:00), Max: 100.1 (01 Jun 2022 18:51)  HR: 97 (02 Jun 2022 10:00) (91 - 105)  BP: 120/84 (02 Jun 2022 10:00) (120/80 - 135/90)  BP(mean): --  RR: 18 (02 Jun 2022 10:00) (17 - 19)  SpO2: 97% (02 Jun 2022 10:00) (95% - 100%)    GENERAL: NAD, resting in bed,   RESPIRATORY: Clear to auscultation bilaterally; No rales, rhonchi, wheezing, or rubs  CARDIOVASCULAR: Regular rate and rhythm; No murmurs, rubs, or gallops  GASTROINTESTINAL: Soft, bloated Nondistended; Bowel sounds present  GENITOURINARY: Not examined  EXTREMITIES:  2+ Peripheral Pulses, No clubbing, cyanosis, or edema  NERVOUS SYSTEM:  Alert & Oriented X3; contracted fingers bilaterally, able to lift arms above head bilaterally; BLE weakness  HEME/LYMPH: No lymphadenopathy noted  SKIN: No rashes or lesions  PSYCH: calm, appropriate    LABS:                        8.6    11.47 )-----------( 269      ( 02 Jun 2022 06:00 )             27.1     06-02    134<L>  |  100  |  20  ----------------------------<  87  4.4   |  23  |  1.03    Ca    8.7      02 Jun 2022 06:00  Phos  2.6     06-02  Mg     1.80     06-02    RADIOLOGY & ADDITIONAL TESTS:  Results Reviewed:   Imaging Personally Reviewed:  Electrocardiogram Personally Reviewed:    COORDINATION OF CARE:  Care Discussed with Consultants/Other Providers [Y/N]: urology re overall care  Prior or Outpatient Records Reviewed [Y/N]:

## 2022-06-02 NOTE — DISCHARGE NOTE PROVIDER - PROVIDER TOKENS
PROVIDER:[TOKEN:[[id number]]] PROVIDER:[TOKEN:[[id number]]],PROVIDER:[TOKEN:[[id number]]]

## 2022-06-02 NOTE — PROGRESS NOTE ADULT - PROBLEM SELECTOR PLAN 2
in setting above, improving  hyponatremia - on salt tabs, consider top and monitor Na. in setting above, improving  hyponatremia - on salt tabs, consider stopping and monitor Na.

## 2022-06-02 NOTE — DISCHARGE NOTE PROVIDER - CARE PROVIDER_API CALL
Anjel Holly)  Urology  [address] [place] [address]  Phone: [phone]  Fax: [phone]  Follow Up Time:    Anjel Holly)  Urology  [address] [place] [address]  Phone: [phone]  Fax: [phone]  Follow Up Time:     Jorge Conway)  Hematology; Internal Medicine; Medical Oncology  [address] [place] [address]  Phone: [phone]  Fax: [phone]  Follow Up Time:

## 2022-06-02 NOTE — DISCHARGE NOTE PROVIDER - CARE PROVIDERS DIRECT ADDRESSES
,DirectAddress_Unknown ,DirectAddress_Unknown,[email or web address]

## 2022-06-02 NOTE — CHART NOTE - NSCHARTNOTESELECT_GEN_ALL_CORE
Oncology Team/Event Note
Hematology-oncology on call/Event Note
Hematology/Event Note
IR Resident/Off Service Note
SICU transfer

## 2022-06-02 NOTE — DISCHARGE NOTE PROVIDER - NSDCFUSCHEDAPPT_GEN_ALL_CORE_FT
Jorge Conway  [facility]  Willie Mata  Scheduled Appointment: 06/15/2022

## 2022-06-02 NOTE — DISCHARGE NOTE NURSING/CASE MANAGEMENT/SOCIAL WORK - PATIENT PORTAL LINK FT
You can access the FollowMyHealth Patient Portal offered by [facility] by registering at the following website: [email or web address]. By joining [employer]’s FollowMyHealth portal, you will also be able to view your health information using other applications (apps) compatible with our system.

## 2022-06-02 NOTE — CHART NOTE - NSCHARTNOTEFT_GEN_A_CORE
Patient has been scheduled for an appointment with Dr. Conway at [facility] for 6/15/2022 at 11:00 AM. Please add appointment to discharge paperwork.   D/w urology team regarding plan to hopefully obtain MR head w/wo contrast as inpatient.   Patient will have close outpatient follow up with the urology team.     Please do not hesitate to page with questions.     Karen Nguyen MD PGY4   [phone]  Hematology-Oncology Fellow

## 2022-06-02 NOTE — DISCHARGE NOTE PROVIDER - NSDCMRMEDTOKEN_GEN_ALL_CORE_FT
acetaminophen 325 mg oral tablet: 3 tab(s) orally every 6 hours  aspirin 81 mg oral tablet: 1 tab(s) orally once a day  carvedilol 6.25 mg oral tablet: 1 tab(s) orally 2 times a day  Lipitor 40 mg oral tablet: 1 tab(s) orally once a day  lisinopril 2.5 mg oral tablet: 1 tab(s) orally once a day   acetaminophen 325 mg oral tablet: 3 tab(s) orally every 6 hours  aspirin 81 mg oral tablet: 1 tab(s) orally once a day  bisacodyl 5 mg oral delayed release tablet: 1 tab(s) orally once a day (at bedtime) as needed for constipation  carvedilol 6.25 mg oral tablet: 1 tab(s) orally 2 times a day  Lipitor 40 mg oral tablet: 1 tab(s) orally once a day  lisinopril 2.5 mg oral tablet: 1 tab(s) orally once a day  polyethylene glycol 3350 oral powder for reconstitution: 17 gram(s) orally once a day as needed for constipation  senna oral tablet: 2 tab(s) orally once a day (at bedtime) as needed for constipation

## 2022-06-02 NOTE — DISCHARGE NOTE NURSING/CASE MANAGEMENT/SOCIAL WORK - NSDPDISTO_GEN_ALL_CORE
Pt. is afebrile and offers no complaints. In no acute distress. Patient is voiding in adequate amounts, + BM, tolerating diet well./Home

## 2022-06-03 VITALS
DIASTOLIC BLOOD PRESSURE: 75 MMHG | HEART RATE: 99 BPM | TEMPERATURE: 98 F | OXYGEN SATURATION: 100 % | RESPIRATION RATE: 19 BRPM | SYSTOLIC BLOOD PRESSURE: 129 MMHG

## 2022-06-03 PROBLEM — S37.019A MINOR CONTUSION OF UNSPECIFIED KIDNEY, INITIAL ENCOUNTER: Chronic | Status: ACTIVE | Noted: 2022-05-27

## 2022-06-03 LAB
ANION GAP SERPL CALC-SCNC: 11 MMOL/L — SIGNIFICANT CHANGE UP (ref 7–14)
BUN SERPL-MCNC: 19 MG/DL — SIGNIFICANT CHANGE UP (ref 7–23)
CALCIUM SERPL-MCNC: 8.7 MG/DL — SIGNIFICANT CHANGE UP (ref 8.4–10.5)
CHLORIDE SERPL-SCNC: 98 MMOL/L — SIGNIFICANT CHANGE UP (ref 98–107)
CO2 SERPL-SCNC: 24 MMOL/L — SIGNIFICANT CHANGE UP (ref 22–31)
CREAT SERPL-MCNC: 0.88 MG/DL — SIGNIFICANT CHANGE UP (ref 0.5–1.3)
EGFR: 107 ML/MIN/1.73M2 — SIGNIFICANT CHANGE UP
GLUCOSE SERPL-MCNC: 162 MG/DL — HIGH (ref 70–99)
POTASSIUM SERPL-MCNC: 4.2 MMOL/L — SIGNIFICANT CHANGE UP (ref 3.5–5.3)
POTASSIUM SERPL-SCNC: 4.2 MMOL/L — SIGNIFICANT CHANGE UP (ref 3.5–5.3)
SARS-COV-2 RNA SPEC QL NAA+PROBE: SIGNIFICANT CHANGE UP
SODIUM SERPL-SCNC: 133 MMOL/L — LOW (ref 135–145)

## 2022-06-03 PROCEDURE — 99232 SBSQ HOSP IP/OBS MODERATE 35: CPT

## 2022-06-03 PROCEDURE — 99231 SBSQ HOSP IP/OBS SF/LOW 25: CPT

## 2022-06-03 RX ADMIN — Medication 81 MILLIGRAM(S): at 13:31

## 2022-06-03 RX ADMIN — HEPARIN SODIUM 5000 UNIT(S): 5000 INJECTION INTRAVENOUS; SUBCUTANEOUS at 06:03

## 2022-06-03 RX ADMIN — PANTOPRAZOLE SODIUM 40 MILLIGRAM(S): 20 TABLET, DELAYED RELEASE ORAL at 06:03

## 2022-06-03 RX ADMIN — Medication 975 MILLIGRAM(S): at 06:03

## 2022-06-03 RX ADMIN — CARVEDILOL PHOSPHATE 6.25 MILLIGRAM(S): 80 CAPSULE, EXTENDED RELEASE ORAL at 06:04

## 2022-06-03 NOTE — PROGRESS NOTE ADULT - SUBJECTIVE AND OBJECTIVE BOX
Overnight events:  None    Subjective:  Pt offers no complaints, feels fine, looking forward to going home    Objective:    Vital signs  T(C): , Max: 37.4 (06-02-22 @ 21:07)  HR: 100 (06-03-22 @ 06:00)  BP: 115/85 (06-03-22 @ 06:00)  SpO2: 99% (06-03-22 @ 06:00)  Wt(kg): --    Output   Void: 1100  06-02 @ 07:01  -  06-03 @ 07:00  --------------------------------------------------------  IN: 480 mL / OUT: 1875 mL / NET: -1395 mL        Gen: NAD  Abd: softly distended, nontender      Labs: none today                        8.6    11.47 )-----------( 269      ( 02 Jun 2022 06:00 )             27.1     02 Jun 2022 06:00    134    |  100    |  20     ----------------------------<  87     4.4     |  23     |  1.03     Ca    8.7        02 Jun 2022 06:00  Phos  2.6       02 Jun 2022 06:00  Mg     1.80      02 Jun 2022 06:00

## 2022-06-03 NOTE — PROGRESS NOTE ADULT - SUBJECTIVE AND OBJECTIVE BOX
Merlin Mathew, MD   Hospitalist  Pager #[phone]    PROGRESS NOTE:     Patient is a 46y old  Male who presents with a chief complaint of Hemorrhage, right renal subcapsular hematoma (03 Jun 2022 08:08)      SUBJECTIVE / OVERNIGHT EVENTS: No overnight events   Patient feels very well, has no complaints   Excited to go home today   Eating well     ADDITIONAL REVIEW OF SYSTEMS:    MEDICATIONS  (STANDING):  acetaminophen     Tablet .. 975 milliGRAM(s) Oral every 6 hours  aspirin enteric coated 81 milliGRAM(s) Oral daily  atorvastatin 40 milliGRAM(s) Oral at bedtime  bisacodyl 5 milliGRAM(s) Oral at bedtime  carvedilol 6.25 milliGRAM(s) Oral every 12 hours  heparin   Injectable 5000 Unit(s) SubCutaneous every 8 hours  pantoprazole    Tablet 40 milliGRAM(s) Oral before breakfast  polyethylene glycol 3350 17 Gram(s) Oral daily  senna 2 Tablet(s) Oral at bedtime    MEDICATIONS  (PRN):  oxyCODONE    IR 5 milliGRAM(s) Oral every 4 hours PRN Moderate Pain (4 - 6)  oxyCODONE    IR 10 milliGRAM(s) Oral every 4 hours PRN Severe Pain (7 - 10)      CAPILLARY BLOOD GLUCOSE        I&O's Summary    02 Jun 2022 07:01  -  03 Jun 2022 07:00  --------------------------------------------------------  IN: 480 mL / OUT: 1875 mL / NET: -1395 mL    03 Jun 2022 07:01  -  03 Jun 2022 11:48  --------------------------------------------------------  IN: 0 mL / OUT: 720 mL / NET: -720 mL        PHYSICAL EXAM:  Vital Signs Last 24 Hrs  T(C): 36.5 (03 Jun 2022 10:12), Max: 37.4 (02 Jun 2022 21:07)  T(F): 97.7 (03 Jun 2022 10:12), Max: 99.3 (02 Jun 2022 21:07)  HR: 99 (03 Jun 2022 10:12) (96 - 104)  BP: 129/75 (03 Jun 2022 10:12) (115/85 - 134/83)  BP(mean): --  RR: 19 (03 Jun 2022 10:12) (18 - 19)  SpO2: 100% (03 Jun 2022 10:12) (98% - 100%)    GENERAL: NAD, resting in bed,   RESPIRATORY: Clear to auscultation bilaterally; No rales, rhonchi, wheezing, or rubs  CARDIOVASCULAR: Regular rate and rhythm; No murmurs, rubs, or gallops  GASTROINTESTINAL: Soft, bloated Nondistended; Bowel sounds present  GENITOURINARY: Not examined  EXTREMITIES:  2+ Peripheral Pulses, No clubbing, cyanosis, or edema  NERVOUS SYSTEM:  Alert & Oriented X3; contracted fingers bilaterally, able to lift arms above head bilaterally; BLE weakness  HEME/LYMPH: No lymphadenopathy noted  SKIN: No rashes or lesions  PSYCH: calm, appropriate      LABS:                        8.6    11.47 )-----------( 269      ( 02 Jun 2022 06:00 )             27.1     06-03    133<L>  |  98  |  19  ----------------------------<  162<H>  4.2   |  24  |  0.88    Ca    8.7      03 Jun 2022 10:01  Phos  2.6     06-02  Mg     1.80     06-02                  RADIOLOGY & ADDITIONAL TESTS:  Results Reviewed:   Imaging Personally Reviewed:  Electrocardiogram Personally Reviewed:    COORDINATION OF CARE:  Care Discussed with Consultants/Other Providers [Y/N]:  Prior or Outpatient Records Reviewed [Y/N]:

## 2022-06-03 NOTE — PROGRESS NOTE ADULT - PROBLEM SELECTOR PLAN 2
In setting of acute blood loss anemia  - Cr improving     # Hyponatremia  - Started on salt tabs in SICU- discontinued 6/2   - Na stable   - Ctm   - OP BMP in 1 week

## 2022-06-03 NOTE — PROGRESS NOTE ADULT - PROBLEM SELECTOR PLAN 3
CT no active extravasation  management per urology.  onc rec MRI brain to r/o underlying pathology- follow up outpatient   onc scheduled f/u appt with Dr. Conway at [facility].

## 2022-06-03 NOTE — PROGRESS NOTE ADULT - REASON FOR ADMISSION
Hemorrhage, right renal subcapsular hematoma

## 2022-06-03 NOTE — PROGRESS NOTE ADULT - PROBLEM SELECTOR PLAN 1
a/w large R subcapsular hematoma, monitored in SICU, s/p 4u PRBCs, 1plt, 1FFP. Seen by heme, no clear bleeding diathesis.   - H&H stabilized  - OP follow up

## 2022-06-03 NOTE — PROGRESS NOTE ADULT - PROVIDER SPECIALTY LIST ADULT
SICU
Urology
SICU
Surgery
Urology
SICU
Urology
Hospitalist
Hospitalist

## 2022-06-03 NOTE — PROGRESS NOTE ADULT - PROBLEM SELECTOR PLAN 4
on home Coreg  lisinopril held in setting JUAN LUIS.
on home Coreg  lisinopril held in setting JUAN LUIS.

## 2022-06-03 NOTE — PROGRESS NOTE ADULT - ASSESSMENT
47 yo M h/o Guillain barre, CVA, bedbound and no movement of lower extremities, and Left renal subcapsular hematoma 9/2017, presented to the ED 5/27/2022 with right sided pain in the lower abdomen, flank and back x 2 days, N/V.  Reports little to no urge to void over the past 24 hours.  Home attendant at the bedside confirms patient has been oliguric over the past 24 hours.  Denies hematuria or dysuria.  Denies fevers. Patient was hypotensive with BP in the 90s with a normal heart rate.  Hgb 11.2, HCt 34.8.  CT performed showing a right renal subcapsular hematoma.  Patient became tachycardic, and more hypotensive with SBP in the 70s.  Repeat HGB 10.4 HCT 32.1. Patient transfused and admitted to SICU.  Patient had a left renal subcapsular hematoma 9/2017, requiring transfusions at that time, and ultimately IR intervention without findings of active bleeding.  Patient recently had a toenail removed, and reports excessive bleeding post procedure requiring Surgicel and pressure dressing.  He denies other episodes of poor clotting, bleeding, or easy bruising. He only takes ASA 81mg, denies other forms of anticoagulation/antiplatelet.     5/27: admitted to SICU for hemodynamic monitoring in setting of large R subcapsular hematoma, transfused 3u pRBC  5/28: transfused 1u pRBC, 1 FFP, 1 platelets; heme-onc consulted, recommend chest CT  5/29: Hct continues to downtrending, IR consult pending, CTA no active extrav, IR with no intervention plans, 1u PRBC  5/30: Doing well, pain controlled  5/31: Had bowel movement, feels well, denies pain   6/1: H/H stable, denies pain, NV, having bowel function, transferred to floor in stable condition, successful TOV  6/2: H/H stable, feels great, trying to arrange MRI as inpt, if not performed, per onc okay to perform as outpt, to f/u with Dr. Conway 6/15, ASA restarted  6/3: pt feels great, looking forward to going home, voiding well, no pain, will perform MRI as outpt    Plan:    -f/u heme  -f/u medicine  -f/u case management  -OOB to WC  -d/c home later today

## 2022-06-10 ENCOUNTER — OUTPATIENT (OUTPATIENT)
Dept: OUTPATIENT SERVICES | Facility: HOSPITAL | Age: 47
LOS: 1 days | Discharge: HOME | End: 2022-06-10

## 2022-06-10 DIAGNOSIS — N28.81 HYPERTROPHY OF KIDNEY: ICD-10-CM

## 2022-06-10 DIAGNOSIS — Z43.0 ENCOUNTER FOR ATTENTION TO TRACHEOSTOMY: Chronic | ICD-10-CM

## 2022-06-14 RX ORDER — LISINOPRIL 30 MG/1
TABLET ORAL
Refills: 0 | Status: ACTIVE | COMMUNITY

## 2022-06-14 RX ORDER — CARVEDILOL 6.25 MG/1
6.25 TABLET, FILM COATED ORAL TWICE DAILY
Refills: 0 | Status: ACTIVE | COMMUNITY

## 2022-06-14 RX ORDER — TAMSULOSIN HYDROCHLORIDE 0.4 MG/1
0.4 CAPSULE ORAL
Refills: 0 | Status: DISCONTINUED | COMMUNITY
End: 2022-06-14

## 2022-06-14 RX ORDER — DOCUSATE SODIUM 100 MG/1
100 CAPSULE ORAL
Refills: 0 | Status: DISCONTINUED | COMMUNITY
End: 2022-06-14

## 2022-06-14 RX ORDER — FERROUS SULFATE 325(65) MG
325 TABLET ORAL
Refills: 0 | Status: DISCONTINUED | COMMUNITY
End: 2022-06-14

## 2022-06-14 RX ORDER — SIMVASTATIN 40 MG/1
40 TABLET, FILM COATED ORAL
Refills: 0 | Status: DISCONTINUED | COMMUNITY
End: 2022-06-14

## 2022-06-14 RX ORDER — ASPIRIN 81 MG
81 TABLET, DELAYED RELEASE (ENTERIC COATED) ORAL DAILY
Refills: 0 | Status: ACTIVE | COMMUNITY

## 2022-06-14 RX ORDER — ATORVASTATIN CALCIUM 40 MG/1
40 TABLET, FILM COATED ORAL
Qty: 90 | Refills: 1 | Status: ACTIVE | COMMUNITY

## 2022-06-15 ENCOUNTER — RESULT REVIEW (OUTPATIENT)
Age: 47
End: 2022-06-15

## 2022-06-15 ENCOUNTER — APPOINTMENT (OUTPATIENT)
Dept: HEMATOLOGY ONCOLOGY | Facility: CLINIC | Age: 47
End: 2022-06-15
Payer: MEDICAID

## 2022-06-15 VITALS
HEIGHT: 75 IN | SYSTOLIC BLOOD PRESSURE: 112 MMHG | TEMPERATURE: 97.2 F | HEART RATE: 87 BPM | RESPIRATION RATE: 16 BRPM | OXYGEN SATURATION: 98 % | WEIGHT: 189.99 LBS | BODY MASS INDEX: 23.62 KG/M2 | DIASTOLIC BLOOD PRESSURE: 80 MMHG

## 2022-06-15 DIAGNOSIS — N28.89 OTHER SPECIFIED DISORDERS OF KIDNEY AND URETER: ICD-10-CM

## 2022-06-15 DIAGNOSIS — Z60.2 PROBLEMS RELATED TO LIVING ALONE: ICD-10-CM

## 2022-06-15 DIAGNOSIS — D69.1 QUALITATIVE PLATELET DEFECTS: ICD-10-CM

## 2022-06-15 LAB
BASOPHILS # BLD AUTO: 0.07 K/UL — SIGNIFICANT CHANGE UP (ref 0–0.2)
BASOPHILS NFR BLD AUTO: 1 % — SIGNIFICANT CHANGE UP (ref 0–2)
EOSINOPHIL # BLD AUTO: 0.41 K/UL — SIGNIFICANT CHANGE UP (ref 0–0.5)
EOSINOPHIL NFR BLD AUTO: 5.6 % — SIGNIFICANT CHANGE UP (ref 0–6)
HCT VFR BLD CALC: 37 % — LOW (ref 39–50)
HGB BLD-MCNC: 11.5 G/DL — LOW (ref 13–17)
IMM GRANULOCYTES NFR BLD AUTO: 0.8 % — SIGNIFICANT CHANGE UP (ref 0–1.5)
LYMPHOCYTES # BLD AUTO: 1.23 K/UL — SIGNIFICANT CHANGE UP (ref 1–3.3)
LYMPHOCYTES # BLD AUTO: 16.8 % — SIGNIFICANT CHANGE UP (ref 13–44)
MCHC RBC-ENTMCNC: 28.7 PG — SIGNIFICANT CHANGE UP (ref 27–34)
MCHC RBC-ENTMCNC: 31.4 G/DL — LOW (ref 32–36)
MCV RBC AUTO: 91.6 FL — SIGNIFICANT CHANGE UP (ref 80–100)
MONOCYTES # BLD AUTO: 0.49 K/UL — SIGNIFICANT CHANGE UP (ref 0–0.9)
MONOCYTES NFR BLD AUTO: 6.7 % — SIGNIFICANT CHANGE UP (ref 2–14)
NEUTROPHILS # BLD AUTO: 5.06 K/UL — SIGNIFICANT CHANGE UP (ref 1.8–7.4)
NEUTROPHILS NFR BLD AUTO: 69.1 % — SIGNIFICANT CHANGE UP (ref 43–77)
NRBC # BLD: 0 /100 WBCS — SIGNIFICANT CHANGE UP (ref 0–0)
PLATELET # BLD AUTO: 429 K/UL — HIGH (ref 150–400)
RBC # BLD: 4.04 M/UL — LOW (ref 4.2–5.8)
RBC # FLD: 15.4 % — HIGH (ref 10.3–14.5)
WBC # BLD: 7.32 K/UL — SIGNIFICANT CHANGE UP (ref 3.8–10.5)
WBC # FLD AUTO: 7.32 K/UL — SIGNIFICANT CHANGE UP (ref 3.8–10.5)

## 2022-06-15 PROCEDURE — 99205 OFFICE O/P NEW HI 60 MIN: CPT

## 2022-06-15 SDOH — SOCIAL STABILITY - SOCIAL INSECURITY: PROBLEMS RELATED TO LIVING ALONE: Z60.2

## 2022-06-16 LAB
ALBUMIN SERPL ELPH-MCNC: 4.3 G/DL
ALP BLD-CCNC: 146 U/L
ALT SERPL-CCNC: 13 U/L
ANION GAP SERPL CALC-SCNC: 15 MMOL/L
AST SERPL-CCNC: 20 U/L
BILIRUB SERPL-MCNC: 1.5 MG/DL
BUN SERPL-MCNC: 13 MG/DL
CALCIUM SERPL-MCNC: 9.6 MG/DL
CHLORIDE SERPL-SCNC: 100 MMOL/L
CO2 SERPL-SCNC: 24 MMOL/L
CREAT SERPL-MCNC: 0.77 MG/DL
EGFR: 112 ML/MIN/1.73M2
GLUCOSE SERPL-MCNC: 94 MG/DL
POTASSIUM SERPL-SCNC: 4.9 MMOL/L
PROT SERPL-MCNC: 7.9 G/DL
SODIUM SERPL-SCNC: 138 MMOL/L

## 2022-06-16 NOTE — ASSESSMENT
[Palliative Care Plan] : not applicable at this time [FreeTextEntry1] : Ganesh Benedict is seen in consultation on Shraddha 15, 2022.  He has a history of Guillain-Barré syndrome in 1999.  He spent considerable time in the hospital and then 3 years in rehabilitation.  He currently lives at home and has 24-hour home care.  He is unable to walk but he is out of bed on a daily basis with a Inga lift.  He goes to physical therapy 3 times a week at this point.  He is referred for a small renal mass which has been present for some time.\par \par He had a capsular hematoma of the kidney in 2017 and was admitted to the hospital for approximately 5 days at that time.  He said that he had a stone removed from his kidney at that time and then had bleeding.  The discharge summary indicated that an interventional radiology intervention was attempted, but no vessel could be seen actively bleeding.  He was seen by a podiatrist last month for an ingrown toenail and he had postprocedural bleeding.  He was seen in the ER at that time at [facility].  He was recently admitted to [facility] with right sided abdominal flank and back pains for 2 days.  He was found to have a right  renal subcapsular hematoma and he was admitted to the surgical ICU.  He was seen by hematology, and platelet aggregation studies were normal.  He was transfused several units of blood.  He has no other episodes of bleeding in the past.  He says that he had his wisdom teeth removed without any complications.  He had a leg fracture at age 1 and he is not aware of any bleeding issues at that time.  He claims he has no easy bruising but he used to have frequent epistaxis.  This was more than 20 years ago.  He was never seen in the emergency room.  It usually stopped easily, and no packing was ever placed.  He is not aware of any bleeding issues within his family.\par \par He has no swallowing issues.  He does have dysphonia.  He had a tracheostomy at the onset of his Guillain-Barré illness.  He says that he was not on a ventilator to his knowledge, but that is unlikely to be the case given the fact that he had a tracheostomy.  His appetite is good.  He had some weight loss recently.  There are no headaches or dizziness.  There is no cough or shortness of breath.  There were no GI complaints.  He is continent of urine.  He does not use a diaper.  He uses a urinal to void.  He has not had any recent urinary tract infections.  He has no dysuria.  There is no hematuria noted.  He did see his primary care doctor recently and was told he has some microscopic hematuria.  His hemoglobin was 11 on that visit.  He has had no further pains in the back.  He does take aspirin 81 mg.  He had a CVA around 2010 with residual issues with his speech.  He was able to walk with a walker before the stroke.  There were no fevers or chills.  He did not have any coronavirus infection.  He received his vaccines.  He has no pains at the current time.  There is no edema.  There is no chest pain or chest pressure.\par \par A comprehensive history was obtained and a limited physical examination was performed.\par \par Imaging studies were reviewed.  A large RIGHT subcapsular hematoma with adjacent pararenal space retroperitoneal hemorrhage was noted, and it was stable in comparison to the CT scan on admission.  There was compression of the right renal parenchyma.  There was a 1.8 cm indeterminate left renal lesion and it was recommended that it be further characterized with an MRI.\par \par In 2018, July, he had a CT scan performed at that time and that revealed a LEFT renal cell gallate, which was demonstrating at that time a continued decrease in size.  It was 2.3 cm and had decreased to 1.2.  There were nonobstructing calculi in both kidneys measuring up to 6 mm in size.  There was a 1.4 cm enhancing cortical lesion in the lateral interpolar left kidney not significantly changed from prior imaging.\par \par Laboratory results from his hospitalization were reviewed.  His coagulation studies were normal.  Because of the history of bleeding issues, platelet aggregation was performed.  He was on aspirin while this was done.  I spoke with Dr. Thomas of hematopathology, who was the person who read the interpretation.  She notes that 30% of patients with platelet storage pool disorders have normal platelet functions, and that electron microscopic evaluation is required if there is a suspicion of a platelet disorder.  I contacted her before hand, and on the day of this visit, I called the laboratory at [facility] to ask for instructions as to how to collect the sample.\par \par His chemistry panel from yesterday revealed a total bilirubin of 1.5.  The alkaline phosphatase was 146 rest of the chemistry results were normal.  The white blood cell count was 7.32 with a hemoglobin value of 11.5 g.  The platelet count was minimally elevated at 429,000.  The differential was normal.\par \par The renal lesion is not something that an oncologist follows as this is the domain of the urologist.  He has an appointment to see Dr. Anjel Holly in early July.  He requires investigation of platelet function given the fact that he has had subcapsular hematomas in both kidneys.  The first 1 may have been associated with an intervention in regards to stones, but this latest one was spontaneous.  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  Once I have the electron microscopic data, I will contact him with the results and I would likely have him see one of our hematology colleagues for further definition.\par \par All questions were answered to the best my ability and to his apparent satisfaction.\par \par \par Platelet aggregation studies were performed on May 31 during his recent hospitalization.  There was no ATP release with 20 micromolar ADP.  There was no ATP release with 5 micromolar ADP, and the response to collagen was decreased.  All other parameters were normal.\par \par The interpretation said aggregation is normal with collagen epinephrine arachidonic acid, ADP in both high and low concentrations and ristocetin both in high and low concentrations.  Thrombin induced ATP is normal.  Platelet morphology was normal.\par The findings are consistent with normal platelet aggregation and normal thrombin induced ATP release studies.  Note says that 30% of patients with suspected inherited platelet disorders may have completely normal laboratory results.  Additional testing such as platelet electron microscopic studies can be considered to exclude the possibility of a storage pool deficiency.\par \par

## 2022-06-16 NOTE — REVIEW OF SYSTEMS
[Constipation] : constipation [Difficulty Walking] : difficulty walking [Anxiety] : anxiety [Muscle Weakness] : muscle weakness [Easy Bleeding] : a tendency for easy bleeding [Negative] : Eyes [Fever] : no fever [Chills] : no chills [Fatigue] : no fatigue [Recent Change In Weight] : ~T no recent weight change [Chest Pain] : no chest pain [Palpitations] : no palpitations [Lower Ext Edema] : no lower extremity edema [Wheezing] : no wheezing [Cough] : no cough [SOB on Exertion] : no shortness of breath during exertion [Abdominal Pain] : no abdominal pain [Vomiting] : no vomiting [Diarrhea] : no diarrhea [Incontinence] : no incontinence [Dysuria] : no dysuria [Joint Pain] : no joint pain [Joint Stiffness] : no joint stiffness [Skin Rash] : no skin rash [Muscle Pain] : no muscle pain [Dizziness] : no dizziness [Depression] : no depression [Easy Bruising] : no tendency for easy bruising [FreeTextEntry4] : dysphoria [FreeTextEntry9] : no cramps [de-identified] : no HA

## 2022-06-16 NOTE — REASON FOR VISIT
[Initial Consultation] : an initial consultation [Formal Caregiver] : formal caregiver [FreeTextEntry2] : renal mass Never

## 2022-06-16 NOTE — RESULTS/DATA
[FreeTextEntry1] : ACC: [id number] EXAM: CT ANGIO ABD PELV (W)AW IC\par ACC: [id number] EXAM: CT CHEST\par PROCEDURE DATE: 05/29/2022\par INTERPRETATION: CLINICAL INFORMATION: Subcapsular right renal hematoma, evaluate for active bleed\par COMPARISON: CT abdomen pelvis 5/27/2022.\par CONTRAST/COMPLICATIONS:\par IV Contrast: Omnipaque 350 60cc cc administered 3cc cc discarded\par Oral Contrast: NONE\par Complications: None reported at time of study completion\par \par PROCEDURE:\par Noncontrast CT chest. CT Abdomen and Pelvis.\par Noncontrast imaging was performed through the chest followed by noncontrast, arterial and venous phase imaging of the abdomen and pelvis.\par Sagittal and coronal reformats were performed as well as 3D (MIP) reconstructions.\par \par FINDINGS:\par \par CHEST:\par LUNGS AND LARGE AIRWAYS: Patent central airways. Sequelae of prior tracheostomy. Basilar dependent atelectasis. 3 mm nodule of the lingula, image 204 series 3.\par PLEURA: Trace bilateral pleural effusions. No pneumothorax\par VESSELS: Normal caliber of the thoracic aorta and main pulmonary artery.\par HEART: Heart size is within normal limits. Trace pericardial fluid.\par MEDIASTINUM AND IKLEY: Small volume nodes. Esophagus is underdistended.\par CHEST WALL AND LOWER NECK: No chest wall hematoma. Gynecomastia. Visualized thyroid is unremarkable. Sequelae of prior tracheostomy.\par \par ABDOMEN AND PELVIS:\par LIVER: Enlarged, 19 cm in craniocaudal dimension.\par BILE DUCTS: No biliary distention\par GALLBLADDER: Contracted\par SPLEEN: Normal size\par PANCREAS: No main ductal dilatation\par ADRENALS: Unremarkable\par KIDNEYS/URETERS: Large right subcapsular hematoma with adjacent pararenal space retroperitoneal hemorrhage are stable to minimally increased in size. Subcapsular hematoma measures approximately 8.5 x 2.8 cm, previously 7.6 x 2.8 cm remeasured. Adjacent retroperitoneal hematoma measures 6.1 x 10.9 x 14.6 cm, previously 5.6 x 9.8 x 13.6 cm remeasured. Significant streak artifact limits evaluation. Within the limitations of this study, there is no evidence of active extravasation. Correlate with hemodynamics and hematocrit to guide further management.\par \par Compression of the right renal parenchyma redemonstrated. Retained right collecting system contrast suggests ongoing right renal dysfunction.\par \par No left hydronephrosis. 1.8 cm indeterminate left renal lesion redemonstrated which can be further characterized with abdominal MRI when feasible. Nonobstructing renal calculi bilaterally.\par \par BLADDER: Park catheter.\par REPRODUCTIVE ORGANS: Prostate size within normal limits.\par \par BOWEL: Small hiatal hernia. No small bowel distention. Slight air distention of the sigmoid colon and narrowing at the rectosigmoid region, nonspecific.\par PERITONEUM: Increased small complex ascites, particularly in the right lower quadrant, in the interim since 5/27/2022. Complex fluid in the inguinal canals was noted on 5/27/2022 as well.\par VESSELS: No abdominal aortic aneurysm. IVC is compressed secondary to right retroperitoneal hematoma, similar to prior.\par RETROPERITONEUM/LYMPH NODES: Right retroperitoneal hematoma as described above.\par ABDOMINAL WALL: Abdominal wall soft tissue edema. Complex fluid within the inguinal canals redemonstrated, right greater than left, similar to 5/27/2022.\par BONES: Degenerative changes.\par \par IMPRESSION:\par Large right subcapsular hematoma with adjacent pararenal space retroperitoneal hemorrhage are stable to minimally increased in size since 5/27/2022. Significant streak artifact limits evaluation. Within the limitations of this study, there is no evidence of active extravasation. Correlate with hemodynamics and hematocrit to guide further management.\par \par Increased small complex ascites, particularly in the right lower quadrant, in the interim since 5/27/2022.\par \par Compression of the right renal parenchyma redemonstrated. Retained right collecting system contrast suggests ongoing right renal dysfunction.\par \par 1.8 cm indeterminate left renal lesion redemonstrated which can be further characterized with abdominal MRI when feasible.\par \par Trace pleural effusions. Bibasilar dependent atelectasis. 3 mm lingular nodule.\par \par --- End of Report ---\par YUKO SANCHEZ MD; Resident Radiologist\par This document has been electronically signed.\par RODRIGO FREEAMN M.D., ATTENDING RADIOLOGIST\par This document has been electronically signed. May 29 2022 3:15PM\par ***************************************************\par ACC: [id number] EXAM: CT ABDOMEN AND PELVIS IC\par PROCEDURE DATE: 05/27/2022\par INTERPRETATION: CLINICAL INFORMATION: Abdominal pain.\par COMPARISON: CT abdomen/pelvis 7/24/2018 and abdominal radiograph 5/27/2022\par CONTRAST/COMPLICATIONS:\par IV Contrast: Omnipaque 350 60 cc administered 10 cc discarded\par Oral Contrast: NONE\par Complications: None reported at time of study completion\par \par PROCEDURE:\par CT of the Abdomen and Pelvis was performed.\par Sagittal and coronal reformats were performed.\par \par FINDINGS:\par LOWER CHEST: Bibasilar subsegmental atelectasis. Bilateral symmetric gynecomastia.\par \par LIVER: Within normal limits.\par BILE DUCTS: Normal caliber.\par GALLBLADDER: Within normal limits.\par SPLEEN: Within normal limits.\par PANCREAS: Within normal limits.\par ADRENALS: Within normal limits.\par KIDNEYS/URETERS: Right subcapsular hematoma measuring 8.4 x 7.8 x 15.6 cm. Increased size of indeterminate 1.8 cm left interpolar hyperattenuating focus, previously 1.3 cm and demonstrating contrast enhancement on prior CT. No hydronephrosis. Bilateral nonobstructing renal calculi measuring 0.5 cm on the right and 0.4 cm on the left.\par \par BLADDER: Within normal limits.\par REPRODUCTIVE ORGANS: Prostate within normal limits.\par \par BOWEL: No bowel obstruction. Appendix is normal.\par PERITONEUM: No ascites.\par VESSELS: Within normal limits.\par RETROPERITONEUM/LYMPH NODES: Right anterior pararenal space hematoma measuring 10.9 x 5.6 x 11.1 hemorrhage tracking into the right lower abdomen, pelvis, and a right inguinal hernia. No lymphadenopathy.\par ABDOMINAL WALL: Within normal limits.\par BONES: Degenerative changes.\par \par IMPRESSION:\par Large right subcapsular renal hematoma. Correlate clinically for Page kidney.\par \par Large right anterior pararenal space hematoma with retroperitoneal hemorrhage tracking into the right lower abdomen, pelvis, and a right inguinal hernia.\par \par Increased size of indeterminate 1.8 cm left interpolar renal lesion, previously demonstrating enhancement and measuring 1.3 cm, suspicious for neoplasm. Consider further evaluation with contrast-enhanced MRI abdomen.\par \par These findings were discussed with Dr. Glass at 5/27/2022 5:[address] Gordon with read back.\par \par --- End of Report ---\par NELLY GORDON MD; Resident Radiology\par This document has been electronically signed.\par WIL PEREZ MD; Attending Radiologist\par This document has been electronically signed. May 27 2022 6:14PM\par ***********************************************\par EXAM: CT ABDOMEN ONLY WAW     2018\par PROCEDURE DATE: 07/24/2018 \par INTERPRETATION: CLINICAL INFORMATION: Left renal subcapsular hematoma. \par Follow-up. \par COMPARISON: CT 1/6/2018, 9/8/2017 \par PROCEDURE: \par CT of the Abdomen was performed with and without intravenous contrast. \par Precontrast, Arterial and Delayed phases were acquired. \par Intravenous contrast: 90 ml Omnipaque 350. 10 ml discarded. \par Oral contrast: None. \par Sagittal and coronal reformats were performed. \par \par FINDINGS: \par \par LOWER CHEST: Within normal limits. \par \par LIVER: Within normal limits. \par BILE DUCTS: Normal caliber. \par GALLBLADDER: Within normal limits. \par SPLEEN: Within normal limits. \par PANCREAS: Within normal limits. \par ADRENALS: Within normal limits. \par KIDNEYS/URETERS: A previously noted left renal subcapsular hematoma demonstrates continued decrease in size and now measures up to 1.2 cm in \par maximal thickness, previously 2.3 cm. Nonobstructing calculi in both kidneys (2 in each kidney measuring up to 6 mm). No hydronephrosis. Once again noted \par is a 1.4 cm enhancing cortical lesion in the lateral interpolar left kidney (6:49), not significantly changed from prior imaging. A few additional \par subcentimeter hypodense cortical based lesions in both kidneys are indeterminate and unchanged. \par VISUALIZED PORTIONS: \par \par BOWEL: Within normal limits. \par PERITONEUM: No ascites. \par VESSELS: Within normal limits. \par RETROPERITONEUM: No lymphadenopathy. \par ABDOMINAL WALL: Within normal limits. \par BONES: Degenerative changes. \par \par IMPRESSION: \par \par Continued interval decrease in size of a left renal subcapsular hematoma. \par \par A 1.4 cm cortically based solid, enhancing lesion in the interpolar left kidney is not significantly changed as are a few additional subcentimeter \par cortically-based indeterminate lesions in both kidneys. \par Small bilateral nonobstructing renal calculi. \par SOPHY HALEY M.D., ATTENDING RADIOLOGIST \par This document has been electronically signed. Jul 25 2018 11:09AM \par *****************************************************\par EXAM: CT ABDOMEN ONLY WAW IC \par PROCEDURE DATE: 01/16/2018 \par INTERPRETATION: \par CLINICAL INFORMATION: Left renal subcapsular hematoma. Follow-up study. \par COMPARISON: Abdominal CT dated 10/17/2017. \par PROCEDURE: \par CT of the Abdomen was performed with and without intravenous contrast. \par Precontrast, Arterial and Delayed phases were acquired. \par Intravenous contrast: 90 ml Omnipaque 350. 10 ml discarded. \par Oral contrast: None. \par Sagittal and coronal reformats were performed. \par \par FINDINGS: \par \par LOWER CHEST: Bilateral gynecomastia. \par \par LIVER: Within normal limits. \par BILE DUCTS: Normal caliber. \par GALLBLADDER: Within normal limits. \par SPLEEN: Within normal limits. \par PANCREAS: Within normal limits. \par ADRENALS: Within normal limits. \par KIDNEYS/URETERS: The previously noted left renal subcapsular hematoma had decreased in size currently measuring 3.5 x 2.2 cm. The kidneys are normal \par in size without hydronephrosis. There are scattered bilateral nonobstructing renal calculi including 7 mm calculus in the midpole of the left kidney and \par 6 mm calculus in the lower pole of the right kidney. \par There are bilateral scattered hypodense cortical lesions along the periphery of both kidneys demonstrating questionable enhancement. Reference lesion \par include a 1.4 cm lesion in the midpole of the left kidney laterally measuring 27 Hounsfield units on the noncontrast images and 91 Hounsfield \par units on the postcontrast images and a 1 cm right midpole cortical lesion measuring 37 Hounsfield units on the noncontrast images and 70 Hounsfield \par units on the postcontrast images. A 4 mm cortical hypodense lesion is noted in the mid pole of the left kidney posteriorly adjacent to the hematoma. It is too small to characterize. \par \par Visualized portion of the: \par BOWEL: No bowel obstruction. \par PERITONEUM: No ascites. \par VESSELS: Within normal limits. \par RETROPERITONEUM: No lymphadenopathy. \par ABDOMINAL WALL: Within normal limits. \par BONES: Mild degenerative changes. \par \par IMPRESSION: Interval decrease in size of left renal subcapsular hematoma. \par \par Bilateral subcentimeter nonobstructing renal calculi. \par \par Small bilateral cortical renal lesions demonstrating questionable enhancement. Consider further evaluation with interval contrast enhanced renal MRI. \par JEAN-CLAUDE AG M.D., ATTENDING RADIOLOGIST [phone] \par This document has been electronically signed. Jan 16 2018 3:23PM \par *************************************************\par

## 2022-06-16 NOTE — HISTORY OF PRESENT ILLNESS
[de-identified] : Ganesh Benedict is seen in consultation on 6/15/22. 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 Goes to PT three times a week. Had capsular hematoma in 2017, admitted for 5 days at that time. He says he had stones removed from his kidney stones, discharge summary indicated that IR intervention was attempted, but no vessel could be seen actively bleeding. Saw the podiatrist last month for an ingrown toenail and he had post procedural bleeding. He was seen in the ER. @ [facility]. Recently admitted to [facility] with right sided abdominal, flank and back pains x 2 days, found to have a right renal subcapsular hematoma, admitted to SICU. Platelet aggregation  was normal. he was transfused several units of blood. No other episodes of bleeding in the past. he says his wisdom teeth removed, No bleeding issues with that. Had a leg fracture at age 1, not aware of bleeding issues at that time. No easy bruising. used to have epistaxis, more the  20 years ago, no ER visits, stopped easily, no packing ever. he is not aware of any bleeding issues in the family. \par \par No swallowing issues..he does have dysphonia. He was not on a ventilator to his knowledge. he was admitted for months, 1999, transferred to rehab for  about 3 years. he lives at home with 24 hour home care. Appetite is good, some weight loss. No headaches, no dizziness. No cough, no MARIANO. No N/V/D/C. Continent of urine, no diaper, uses a urinal. NO UTIs, no dysuria. No hematuria noted, saw PCP and had some microscopic hematuria, hemoglobin was 11.. No further pains in the back. 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 No fevers, no chills. NO CV infection, received vaccines. No pains. No edema. 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  Reports little to no urge to void over the past 24 hours.  Home attendant at the \par bedside confirms patient has been oliguric over the past 24 hours.  Denies hematuria or dysuria.  Denies fevers. Patient was hypotensive with BP in the \par 90s with a normal heart rate.  Hgb 11.2, HCt 34.8.  CT performed showing a right renal subcapsular hematoma.  Patient became tachycardic, and more \par hypotensive with SBP in the 70s.  Repeat HGB 10.4 HCT 32.1. Patient transfused and admitted to SICU.  Patient had a left renal subcapsular hematoma 9/2017, \par requiring transfusions at that time, and ultimately IR intervention without findings of active bleeding.  Patient recently had a toenail removed, and \par reports excessive bleeding post procedure requiring Surgicel and pressure dressing.  He denies other episodes of poor clotting, bleeding, or easy \par bruising. He only takes ASA 81mg, denies other forms of anticoagulation/antiplatelet.

## 2022-06-16 NOTE — PHYSICAL EXAM
[Completely disabled. Cannot carry on any self care. Totally confined to bed or chair] : Status 4- Completely disabled. Cannot carry on any self care. Totally confined to bed or chair [Normal] : affect appropriate [de-identified] : no edema [de-identified] : contractures, claw hands [de-identified] : distal paresis

## 2022-07-13 ENCOUNTER — APPOINTMENT (OUTPATIENT)
Dept: UROLOGY | Facility: CLINIC | Age: 47
End: 2022-07-13

## 2022-07-13 VITALS
TEMPERATURE: 98.2 F | RESPIRATION RATE: 16 BRPM | HEART RATE: 77 BPM | HEIGHT: 75 IN | WEIGHT: 195 LBS | SYSTOLIC BLOOD PRESSURE: 128 MMHG | BODY MASS INDEX: 24.25 KG/M2 | DIASTOLIC BLOOD PRESSURE: 87 MMHG

## 2022-07-13 PROCEDURE — 99215 OFFICE O/P EST HI 40 MIN: CPT

## 2022-07-13 NOTE — HISTORY OF PRESENT ILLNESS
[FreeTextEntry1] : Ganesh Benedict is seen in consultation on 6/15/22. 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 Goes to PT three times a week. Had capsular hematoma in 2017, admitted for 5 days at that time. He says he had stones removed from his kidney stones, discharge summary indicated that IR intervention was attempted, but no vessel could be seen actively bleeding. Saw the podiatrist last month for an ingrown toenail and he had post procedural bleeding. He was seen in the ER. @ [facility]. Recently admitted to [facility] with right sided abdominal, flank and back pains x 2 days, found to have a right renal subcapsular hematoma, admitted to SICU. Platelet aggregation was normal. he was transfused several units of blood. No other episodes of bleeding in the past. he says his wisdom teeth removed, No bleeding issues with that. Had a leg fracture at age 1, not aware of bleeding issues at that time. No easy bruising. used to have epistaxis, more the 20 years ago, no ER visits, stopped easily, no packing ever. he is not aware of any bleeding issues in the family. \par \par He denies any persistent gross hematuria.  Overall feels well no pain.\par \par IMP: S spontaneous renal bleed which now seems to have resolved patient feels well looks well\par \par Plan CT angiogram to evaluate the possibility of a small renal mass causing the bleed patient was also get a CBC and basic metabolic panel today

## 2022-07-14 LAB
ANION GAP SERPL CALC-SCNC: 12 MMOL/L
BASOPHILS # BLD AUTO: 0.04 K/UL
BASOPHILS NFR BLD AUTO: 0.6 %
BUN SERPL-MCNC: 11 MG/DL
CALCIUM SERPL-MCNC: 9.2 MG/DL
CHLORIDE SERPL-SCNC: 103 MMOL/L
CO2 SERPL-SCNC: 26 MMOL/L
CREAT SERPL-MCNC: 0.63 MG/DL
EGFR: 119 ML/MIN/1.73M2
EOSINOPHIL # BLD AUTO: 0.44 K/UL
EOSINOPHIL NFR BLD AUTO: 6.1 %
GLUCOSE SERPL-MCNC: 81 MG/DL
HCT VFR BLD CALC: 41.3 %
HGB BLD-MCNC: 12.6 G/DL
IMM GRANULOCYTES NFR BLD AUTO: 0.4 %
LYMPHOCYTES # BLD AUTO: 1.45 K/UL
LYMPHOCYTES NFR BLD AUTO: 20.1 %
MAN DIFF?: NORMAL
MCHC RBC-ENTMCNC: 28.4 PG
MCHC RBC-ENTMCNC: 30.5 GM/DL
MCV RBC AUTO: 93 FL
MONOCYTES # BLD AUTO: 0.42 K/UL
MONOCYTES NFR BLD AUTO: 5.8 %
NEUTROPHILS # BLD AUTO: 4.83 K/UL
NEUTROPHILS NFR BLD AUTO: 67 %
PLATELET # BLD AUTO: 295 K/UL
POTASSIUM SERPL-SCNC: 4.3 MMOL/L
RBC # BLD: 4.44 M/UL
RBC # FLD: 15.5 %
SODIUM SERPL-SCNC: 140 MMOL/L
WBC # FLD AUTO: 7.21 K/UL

## 2022-08-04 ENCOUNTER — OUTPATIENT (OUTPATIENT)
Dept: OUTPATIENT SERVICES | Facility: HOSPITAL | Age: 47
LOS: 1 days | End: 2022-08-04
Payer: MEDICAID

## 2022-08-04 ENCOUNTER — APPOINTMENT (OUTPATIENT)
Dept: CT IMAGING | Facility: IMAGING CENTER | Age: 47
End: 2022-08-04

## 2022-08-04 DIAGNOSIS — Z00.8 ENCOUNTER FOR OTHER GENERAL EXAMINATION: ICD-10-CM

## 2022-08-04 DIAGNOSIS — Z43.0 ENCOUNTER FOR ATTENTION TO TRACHEOSTOMY: Chronic | ICD-10-CM

## 2022-08-04 DIAGNOSIS — S37.019A MINOR CONTUSION OF UNSPECIFIED KIDNEY, INITIAL ENCOUNTER: ICD-10-CM

## 2022-08-04 DIAGNOSIS — N28.89 OTHER SPECIFIED DISORDERS OF KIDNEY AND URETER: ICD-10-CM

## 2022-08-04 PROCEDURE — 74178 CT ABD&PLV WO CNTR FLWD CNTR: CPT

## 2022-08-04 PROCEDURE — 74178 CT ABD&PLV WO CNTR FLWD CNTR: CPT | Mod: 26

## 2022-08-04 PROCEDURE — 82565 ASSAY OF CREATININE: CPT

## 2022-08-13 NOTE — PATIENT PROFILE ADULT - FUNCTIONAL ASSESSMENT - BASIC MOBILITY 4.
[facility]    Pt Name: Carline Mcfarland  MRN: [id number]  [place] Bang Polk MD  Attending Emergency Physician         Bang Polk MD  08/12/22 2057 1 = Total assistance

## 2022-08-18 ENCOUNTER — NON-APPOINTMENT (OUTPATIENT)
Age: 47
End: 2022-08-18

## 2022-08-18 DIAGNOSIS — N20.0 CALCULUS OF KIDNEY: ICD-10-CM

## 2022-08-24 DIAGNOSIS — Z01.818 ENCOUNTER FOR OTHER PREPROCEDURAL EXAMINATION: ICD-10-CM

## 2022-08-25 NOTE — PROGRESS NOTE ADULT - SUBJECTIVE AND OBJECTIVE BOX
Pt assisted to bathroom for the second time. Pt reports having large blood clots coming out of vagina. Pt reports the bleeding is very heavy. Pt returned to cot. Linens changed.      Zach Shirley RN  08/25/22 3399 HPI:  41yo M with PMHx previous CVA, paraplegic who presented with LLQ pain x 24hrs     Vital Signs Last 24 Hrs  T(C): 36.9 (10 Sep 2017 05:50), Max: 37.1 (09 Sep 2017 10:30)  T(F): 98.4 (10 Sep 2017 05:50), Max: 98.7 (09 Sep 2017 10:30)  HR: 96 (10 Sep 2017 05:50) (96 - 105)  BP: 104/63 (10 Sep 2017 05:50) (100/58 - 116/81)  BP(mean): --  RR: 17 (10 Sep 2017 05:50) (16 - 18)  SpO2: 100% (10 Sep 2017 05:50) (98% - 100%)    LABS:  Hg (last 48 hours): 11.5, 10.5, 9.6, 8.9, 8.9, 9.0, 7.7    Imaging:   CT a/p (9/8/2017): Stable large left renal subcapsular hematoma.     Hospital Course:   Patient had an acute drop in hemoglobin, IR consulted for possible Angio and embolization on 9/9/2017. Radiology resident Dr. Gordon contact the IR attending Dr. Faustin on 9/9/2017 at approximately 12pm. The case was discussed, given the patient had a retroperitoneal bleed that was contained and these types of bleeds have a tendency to tamponade the recommendation was for conservative management and to resuscitate the patient with blood as needed. If the patient continued to decline with decreasing Hg or Vitals then angiography and embolization would be considered. The plan was discussed with the urology resident.     The patients Hg was trended on 9/9 and was stable, however this morning Hg acutely dropped to 7.7.  The plan was discussed with Urology resident on 9/10/2017 at 10am and the teams plan was for blood transfusion.     Assessment:  41yo M presented with L sided abdominal pain found to have a large left subcapsular hematoma that was stable in size from 9/7 to 9/8.  The patients Hg was stable on 9/9 however there was an acute drop in Hg this morning to 7.7. The patient is planned for 2 units of pRBC this morning.     Plan:  - Recommend rescuscitiating the patient with blood transfusions and monitoring CBC. If the patient's hemoglobin/hematocrit continues to decrease and/or the patients vitals show signs of instability then we can consider angiography and embolization.    Plan was d/w Urology resident.

## 2022-08-30 ENCOUNTER — OUTPATIENT (OUTPATIENT)
Dept: OUTPATIENT SERVICES | Facility: HOSPITAL | Age: 47
LOS: 1 days | End: 2022-08-30

## 2022-08-30 VITALS
SYSTOLIC BLOOD PRESSURE: 125 MMHG | RESPIRATION RATE: 16 BRPM | WEIGHT: 188.05 LBS | HEIGHT: 75 IN | OXYGEN SATURATION: 98 % | HEART RATE: 67 BPM | DIASTOLIC BLOOD PRESSURE: 87 MMHG | TEMPERATURE: 98 F

## 2022-08-30 DIAGNOSIS — Z43.0 ENCOUNTER FOR ATTENTION TO TRACHEOSTOMY: Chronic | ICD-10-CM

## 2022-08-30 DIAGNOSIS — I10 ESSENTIAL (PRIMARY) HYPERTENSION: ICD-10-CM

## 2022-08-30 DIAGNOSIS — Z98.890 OTHER SPECIFIED POSTPROCEDURAL STATES: Chronic | ICD-10-CM

## 2022-08-30 DIAGNOSIS — N20.0 CALCULUS OF KIDNEY: ICD-10-CM

## 2022-08-30 DIAGNOSIS — D69.1 QUALITATIVE PLATELET DEFECTS: ICD-10-CM

## 2022-08-30 LAB
ANION GAP SERPL CALC-SCNC: 13 MMOL/L — SIGNIFICANT CHANGE UP (ref 7–14)
BUN SERPL-MCNC: 9 MG/DL — SIGNIFICANT CHANGE UP (ref 7–23)
CALCIUM SERPL-MCNC: 9.4 MG/DL — SIGNIFICANT CHANGE UP (ref 8.4–10.5)
CHLORIDE SERPL-SCNC: 103 MMOL/L — SIGNIFICANT CHANGE UP (ref 98–107)
CO2 SERPL-SCNC: 24 MMOL/L — SIGNIFICANT CHANGE UP (ref 22–31)
CREAT SERPL-MCNC: 0.65 MG/DL — SIGNIFICANT CHANGE UP (ref 0.5–1.3)
EGFR: 117 ML/MIN/1.73M2 — SIGNIFICANT CHANGE UP
GLUCOSE SERPL-MCNC: 70 MG/DL — SIGNIFICANT CHANGE UP (ref 70–99)
HCT VFR BLD CALC: 46.8 % — SIGNIFICANT CHANGE UP (ref 39–50)
HGB BLD-MCNC: 14.1 G/DL — SIGNIFICANT CHANGE UP (ref 13–17)
MCHC RBC-ENTMCNC: 27.4 PG — SIGNIFICANT CHANGE UP (ref 27–34)
MCHC RBC-ENTMCNC: 30.1 GM/DL — LOW (ref 32–36)
MCV RBC AUTO: 91.1 FL — SIGNIFICANT CHANGE UP (ref 80–100)
NRBC # BLD: 0 /100 WBCS — SIGNIFICANT CHANGE UP (ref 0–0)
NRBC # FLD: 0 K/UL — SIGNIFICANT CHANGE UP (ref 0–0)
PLATELET # BLD AUTO: 245 K/UL — SIGNIFICANT CHANGE UP (ref 150–400)
POTASSIUM SERPL-MCNC: 4.1 MMOL/L — SIGNIFICANT CHANGE UP (ref 3.5–5.3)
POTASSIUM SERPL-SCNC: 4.1 MMOL/L — SIGNIFICANT CHANGE UP (ref 3.5–5.3)
RBC # BLD: 5.14 M/UL — SIGNIFICANT CHANGE UP (ref 4.2–5.8)
RBC # FLD: 14.7 % — HIGH (ref 10.3–14.5)
SODIUM SERPL-SCNC: 140 MMOL/L — SIGNIFICANT CHANGE UP (ref 135–145)
WBC # BLD: 5.72 K/UL — SIGNIFICANT CHANGE UP (ref 3.8–10.5)
WBC # FLD AUTO: 5.72 K/UL — SIGNIFICANT CHANGE UP (ref 3.8–10.5)

## 2022-08-30 PROCEDURE — 93010 ELECTROCARDIOGRAM REPORT: CPT

## 2022-08-30 RX ORDER — LISINOPRIL 2.5 MG/1
1 TABLET ORAL
Qty: 0 | Refills: 0 | DISCHARGE

## 2022-08-30 RX ORDER — ATORVASTATIN CALCIUM 80 MG/1
1 TABLET, FILM COATED ORAL
Qty: 0 | Refills: 0 | DISCHARGE

## 2022-08-30 RX ORDER — ASPIRIN/CALCIUM CARB/MAGNESIUM 324 MG
1 TABLET ORAL
Qty: 0 | Refills: 0 | DISCHARGE

## 2022-08-30 NOTE — H&P PST ADULT - NSICDXPASTMEDICALHX_GEN_ALL_CORE_FT
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

## 2022-08-30 NOTE — H&P PST ADULT - HISTORY OF PRESENT ILLNESS
48 y/o male PMH GB syndrome 1999, CVA 2010, HTN HLD, platelet dysfunction, wheelchair bound presents to presurgical testing with diagnosis of calculus of kidney scheduled for a right ureteroscopy laser lithotripsy. Pt with a history of left renal subcapsular hematoma in 2017. Pt was hospitalized from May to June 2022 for right sided abdominal pain, right flank pain and back pain, CT revealing a right subcapsular hematoma. Pt required blood transfusions during hospitalization.

## 2022-08-30 NOTE — H&P PST ADULT - PROBLEM SELECTOR PLAN 2
Patient instructed to take lisinopril and carvedilol with a sip of water on the morning of procedure.

## 2022-08-30 NOTE — H&P PST ADULT - PROBLEM SELECTOR PLAN 1
Patient tentatively scheduled for right ureteroscopy laser lithotripsy for 9/6/22. Pre-op instructions provided. Pt given verbal and written instructions with teach back on pepcid. Pt verbalized understanding with return demonstration.     Pt has a scheduled preop COVID test.    46 y/o GB syndrome, CVA 2010 wheelchair bound scheduled for an intermediate risk procedure. Pending medical evaluation.

## 2022-08-30 NOTE — H&P PST ADULT - NSANTHOSAYNRD_GEN_A_CORE
No. JAVY screening performed.  STOP BANG Legend: 0-2 = LOW Risk; 3-4 = INTERMEDIATE Risk; 5-8 = HIGH Risk

## 2022-08-31 LAB
CULTURE RESULTS: SIGNIFICANT CHANGE UP
SPECIMEN SOURCE: SIGNIFICANT CHANGE UP

## 2022-09-02 PROBLEM — R49.0 DYSPHONIA: Chronic | Status: ACTIVE | Noted: 2022-08-30

## 2022-09-02 PROBLEM — N20.0 CALCULUS OF KIDNEY: Chronic | Status: ACTIVE | Noted: 2022-08-30

## 2022-09-02 PROBLEM — D69.1 QUALITATIVE PLATELET DEFECTS: Chronic | Status: ACTIVE | Noted: 2022-08-30

## 2022-09-02 PROBLEM — G61.0 GUILLAIN-BARRE SYNDROME: Chronic | Status: ACTIVE | Noted: 2017-09-07

## 2022-09-02 PROBLEM — I63.9 CEREBRAL INFARCTION, UNSPECIFIED: Chronic | Status: ACTIVE | Noted: 2017-09-07

## 2022-09-06 LAB — SARS-COV-2 N GENE NPH QL NAA+PROBE: DETECTED

## 2022-09-14 ENCOUNTER — APPOINTMENT (OUTPATIENT)
Dept: UROLOGY | Facility: CLINIC | Age: 47
End: 2022-09-14

## 2022-09-19 ENCOUNTER — TRANSCRIPTION ENCOUNTER (OUTPATIENT)
Age: 47
End: 2022-09-19

## 2022-09-20 ENCOUNTER — TRANSCRIPTION ENCOUNTER (OUTPATIENT)
Age: 47
End: 2022-09-20

## 2022-09-20 ENCOUNTER — APPOINTMENT (OUTPATIENT)
Dept: UROLOGY | Facility: HOSPITAL | Age: 47
End: 2022-09-20

## 2022-09-20 ENCOUNTER — OUTPATIENT (OUTPATIENT)
Dept: OUTPATIENT SERVICES | Facility: HOSPITAL | Age: 47
LOS: 1 days | Discharge: HOME | End: 2022-09-20

## 2022-09-20 VITALS
OXYGEN SATURATION: 95 % | DIASTOLIC BLOOD PRESSURE: 78 MMHG | RESPIRATION RATE: 18 BRPM | SYSTOLIC BLOOD PRESSURE: 111 MMHG | TEMPERATURE: 98 F | WEIGHT: 188.05 LBS | HEART RATE: 78 BPM | HEIGHT: 75 IN

## 2022-09-20 VITALS
RESPIRATION RATE: 18 BRPM | SYSTOLIC BLOOD PRESSURE: 135 MMHG | TEMPERATURE: 97 F | DIASTOLIC BLOOD PRESSURE: 87 MMHG | HEART RATE: 70 BPM | OXYGEN SATURATION: 100 %

## 2022-09-20 DIAGNOSIS — N20.0 CALCULUS OF KIDNEY: ICD-10-CM

## 2022-09-20 DIAGNOSIS — Z43.0 ENCOUNTER FOR ATTENTION TO TRACHEOSTOMY: Chronic | ICD-10-CM

## 2022-09-20 DIAGNOSIS — Z98.890 OTHER SPECIFIED POSTPROCEDURAL STATES: Chronic | ICD-10-CM

## 2022-09-20 PROCEDURE — 74420 UROGRAPHY RTRGR +-KUB: CPT | Mod: 26,RT

## 2022-09-20 PROCEDURE — 52353 CYSTOURETERO W/LITHOTRIPSY: CPT | Mod: RT

## 2022-09-20 DEVICE — GUIDEWIRE SENSOR DUAL-FLEX NITINOL STRAIGHT .038" X 150CM: Type: IMPLANTABLE DEVICE | Site: RIGHT | Status: FUNCTIONAL

## 2022-09-20 DEVICE — LASER FIBER HOLMIUM 272: Type: IMPLANTABLE DEVICE | Site: RIGHT | Status: FUNCTIONAL

## 2022-09-20 DEVICE — URETERAL STENT PERCUFLEX PLUS 6FR 26CM: Type: IMPLANTABLE DEVICE | Site: RIGHT | Status: FUNCTIONAL

## 2022-09-20 DEVICE — URETERAL CATH AXXCESS OPEN END 6FR 70CM: Type: IMPLANTABLE DEVICE | Site: RIGHT | Status: FUNCTIONAL

## 2022-09-20 DEVICE — GWIRE STIFF 0.038INX150CM: Type: IMPLANTABLE DEVICE | Site: RIGHT | Status: FUNCTIONAL

## 2022-09-20 DEVICE — URETERAL SHEATH NAVIGATOR HD 12/14FR X 36CM: Type: IMPLANTABLE DEVICE | Site: RIGHT | Status: FUNCTIONAL

## 2022-09-20 DEVICE — DILATOR SHEATH SET 8/10: Type: IMPLANTABLE DEVICE | Site: RIGHT | Status: FUNCTIONAL

## 2022-09-20 DEVICE — STONE BASKET ZEROTIP NITINOL 4-WIRE 1.9FR 120CM X 12MM: Type: IMPLANTABLE DEVICE | Site: RIGHT | Status: FUNCTIONAL

## 2022-09-20 RX ORDER — ASPIRIN/CALCIUM CARB/MAGNESIUM 324 MG
1 TABLET ORAL
Qty: 0 | Refills: 0 | DISCHARGE

## 2022-09-20 RX ORDER — OXYCODONE HYDROCHLORIDE 5 MG/1
5 TABLET ORAL ONCE
Refills: 0 | Status: DISCONTINUED | OUTPATIENT
Start: 2022-09-20 | End: 2022-09-20

## 2022-09-20 RX ORDER — CARVEDILOL PHOSPHATE 80 MG/1
1 CAPSULE, EXTENDED RELEASE ORAL
Qty: 0 | Refills: 0 | DISCHARGE

## 2022-09-20 RX ORDER — ONDANSETRON 8 MG/1
4 TABLET, FILM COATED ORAL ONCE
Refills: 0 | Status: DISCONTINUED | OUTPATIENT
Start: 2022-09-20 | End: 2022-10-04

## 2022-09-20 RX ORDER — TAMSULOSIN HYDROCHLORIDE 0.4 MG/1
1 CAPSULE ORAL
Qty: 10 | Refills: 0
Start: 2022-09-20 | End: 2022-09-29

## 2022-09-20 RX ORDER — LISINOPRIL 2.5 MG/1
1 TABLET ORAL
Qty: 0 | Refills: 0 | DISCHARGE

## 2022-09-20 RX ORDER — SODIUM CHLORIDE 9 MG/ML
1000 INJECTION, SOLUTION INTRAVENOUS
Refills: 0 | Status: DISCONTINUED | OUTPATIENT
Start: 2022-09-20 | End: 2022-10-04

## 2022-09-20 RX ORDER — CIPROFLOXACIN LACTATE 400MG/40ML
1 VIAL (ML) INTRAVENOUS
Qty: 10 | Refills: 0
Start: 2022-09-20 | End: 2022-09-24

## 2022-09-20 RX ORDER — HYDROMORPHONE HYDROCHLORIDE 2 MG/ML
0.5 INJECTION INTRAMUSCULAR; INTRAVENOUS; SUBCUTANEOUS
Refills: 0 | Status: DISCONTINUED | OUTPATIENT
Start: 2022-09-20 | End: 2022-09-20

## 2022-09-20 RX ORDER — ATORVASTATIN CALCIUM 80 MG/1
1 TABLET, FILM COATED ORAL
Qty: 0 | Refills: 0 | DISCHARGE

## 2022-09-20 NOTE — BRIEF OPERATIVE NOTE - NSICDXBRIEFPROCEDURE_GEN_ALL_CORE_FT
PROCEDURES:  Right ureteroscopy 20-Sep-2022 14:14:59 cystoscopy, right ureteroscopy and stone removal, right stent placement Adrien Thayer

## 2022-09-20 NOTE — ASU DISCHARGE PLAN (ADULT/PEDIATRIC) - FOLLOW UP APPOINTMENTS
may also call Recovery Room (PACU) 24/7 @ [phone]/[facility]

## 2022-09-20 NOTE — ASU DISCHARGE PLAN (ADULT/PEDIATRIC) - CALL YOUR DOCTOR IF YOU HAVE ANY OF THE FOLLOWING:
Bleeding that does not stop/Swelling that gets worse/Pain not relieved by Medications/Fever greater than (need to indicate Fahrenheit or Celsius)/Numbness, tingling, color or temperature change to extremity/Nausea and vomiting that does not stop/Unable to urinate/Inability to tolerate liquids or foods

## 2022-09-20 NOTE — ASU PREOP CHECKLIST - COMMENTS
famotidine carvedilol & lisinopril with sip of water this AM famotidine carvedilol & lisinopril with sip of water this 0700

## 2022-09-20 NOTE — ASU DISCHARGE PLAN (ADULT/PEDIATRIC) - ASU DC SPECIAL INSTRUCTIONSFT
Discharge Instructions: Ureteroscopy    · Stent: You may have an internal stent (a hollow tube that runs from the kidney to your bladder) after your procedure, which helps urine drain from the kidney to your bladder. Some patients experience urinary frequency, burning, or even back pain (especially with urination). These sensations will gradually get better. Increasing your fluid intake can also improve these symptoms. While the stent is in place, your urine may continue to be bloody. This stent is temporary and must be removed by your urologist as an outpatient within 3 months unless otherwise specified.    · General: It is common to have blood in the urine after your procedure. It may be pink or even red; inform your doctor if you have a significant amount of clot in the urine or if you are unable to void at all. The urine may clear and then become bloody again especially as you are more physically active.    · Bathing: You may shower or bathe.    · Diet: You may resume your regular diet and regular medication regimen.    · Pain: You may take Tylenol (acetaminophen) 650-975mg and/or Motrin (ibuprofen) 400-600mg, available over the counter, for pain every 6 hours as needed. Do not exceed 4000 milligrams of Tylenol (acetaminophen) daily. You may alternate these medications such that you take either one every 3 hours.    o If you have a stent, the following medications were sent to your pharmacy for stent related discomfort:    § Flomax (tamsulosin) 0.4mg at bedtime until stent removed    · Antibiotics: You may be given a prescription for an antibiotic, please take this medication as instructed and be sure to complete entire course.    · Stool softeners: Do not allow yourself to become constipated as this may increase your bother from the stent and/or straining may cause bleeding. Take stool softeners (ex. Colace) or a laxative (ex. Senekot, ExLax), available over the counter, if needed.    · Activity: No heavy lifting or strenuous exercise until you are evaluated at your post-operative appointment. Otherwise, you may return to your usual level of activity.    · Anticoagulation: If you are taking any blood thinning medications, please discuss with your urologist prior to restarting these medications unless otherwise specified.    · Follow-up: If you did not already schedule your post-operative appointment, please call your urologist to schedule a follow-up appointment.

## 2022-09-20 NOTE — ASU PREOP CHECKLIST - ADVANCE DIRECTIVE ADDRESSED/READDRESSED
Continue protein shakes  Try to continue increasing your intake of food slowly  Increase fluoxetine to 80mg daily  Ok to take prednisone 20mg daily x 10 days.    done

## 2022-09-24 LAB — NIDUS STONE QN: SIGNIFICANT CHANGE UP

## 2022-09-25 NOTE — ED ADULT TRIAGE NOTE - ADDITIONAL SAFETY/BANDS...
Additional Safety/Bands:
no ROM deficits were identified
bilateral upper extremity ROM was WFL (within functional limits)/bilateral lower extremity ROM was WFL (within functional limits)

## 2022-09-28 ENCOUNTER — APPOINTMENT (OUTPATIENT)
Dept: UROLOGY | Facility: CLINIC | Age: 47
End: 2022-09-28

## 2022-09-28 ENCOUNTER — OUTPATIENT (OUTPATIENT)
Dept: OUTPATIENT SERVICES | Facility: HOSPITAL | Age: 47
LOS: 1 days | End: 2022-09-28
Payer: MEDICAID

## 2022-09-28 VITALS — DIASTOLIC BLOOD PRESSURE: 78 MMHG | TEMPERATURE: 97.4 F | SYSTOLIC BLOOD PRESSURE: 115 MMHG | HEART RATE: 71 BPM

## 2022-09-28 DIAGNOSIS — Z98.890 OTHER SPECIFIED POSTPROCEDURAL STATES: Chronic | ICD-10-CM

## 2022-09-28 DIAGNOSIS — R35.0 FREQUENCY OF MICTURITION: ICD-10-CM

## 2022-09-28 DIAGNOSIS — Z43.0 ENCOUNTER FOR ATTENTION TO TRACHEOSTOMY: Chronic | ICD-10-CM

## 2022-09-28 PROCEDURE — 52310 CYSTOSCOPY AND TREATMENT: CPT

## 2022-10-11 DIAGNOSIS — N20.2 CALCULUS OF KIDNEY WITH CALCULUS OF URETER: ICD-10-CM

## 2022-12-07 NOTE — ED PROVIDER NOTE - OBJECTIVE STATEMENT
46 yom with hx of Guillain [place] and bedbound. Pt went to podiatry yesterday and had ingrown toenail removed. Pt now complaining of constant bleeding since then. Pt denies any pain to the area. Is on ASA 81mg only, no other blood thinners. No
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
## 2023-03-16 NOTE — PATIENT PROFILE ADULT. - PRO ARRIVE FROM
home Benzoyl Peroxide Counseling: Patient counseled that medicine may cause skin irritation and bleach clothing.  In the event of skin irritation, the patient was advised to reduce the amount of the drug applied or use it less frequently.   The patient verbalized understanding of the proper use and possible adverse effects of benzoyl peroxide.  All of the patient's questions and concerns were addressed.

## 2023-05-08 NOTE — H&P PST ADULT - HEIGHT IN INCHES
Emergency Department Provider Note       PCP: PROVIDER UNKNOWN, DEB   Age: 54 y.o. Sex: female     [address] Admitted 05/08/2023 03:15:10 PM       ICD-10-CM    1. Acute abdominal pain  R10.9       2. Drug-induced acute pancreatitis, unspecified complication status  N01.49       3. Type 2 diabetes mellitus with other specified complication, with long-term current use of insulin (HCC)  E11.69     Z79.4           Medical Decision Making   Acute abdominal pain without any diarrhea. Do not believe this is necessarily food poisoning. Possibility of viral syndrome. Patient recently treated for peptic ulcer disease. Some potential for perforation. Also pancreatitis, diverticulitis. Also gallbladder issues. Less likely would be kidney stones, UTI or appendicitis. IV fluids. Pain and nausea control. Screening lab work. Imaging dependent upon lab work. Complexity of Problems Addressed:  1 or more acute illnesses that pose a threat to life or bodily function. Data Reviewed and Analyzed:  Category 1:   I independently ordered and reviewed each unique test.  I reviewed external records: provider visit note from PCP. Reviewed primary care doctor's notes. Patient placed on Jardiance and Victoza for type 2 diabetes. Category 2:   I independently ordered and interpreted the ED EKG in the absence of a Cardiologist.    Rate: 111  EKG Interpretation: EKG Interpretation: sinus rhythm, no evidence of arrhythmia and no acute changes  ST Segments: Nonspecific ST segments - NO STEMI. Suspect LVH. I interpreted the CT Scan agree. No free air or obstruction. Category 3: Discussion of management or test interpretation. Patient given IV fluids and pain medications. Labs consistent with pancreatitis. Imaging consistent with that as well. No obvious gallbladder disease. Patient will require pain control. Possibility of pancreatitis from new medications. Patient states minimal drinker.   No evidence for 3

## 2023-09-08 NOTE — ED PROVIDER NOTE - CRITERIA ADMIT PEDS PT
Lm w/results  
Verena Boone, DO Andriy Moseley, CMA  Your screening mammogram is normal. Continue with yearly screening mamms  
No

## 2023-09-29 NOTE — DISCHARGE NOTE ADULT - CLICK TO LAUNCH ORM
Patient: Nelli Viera    Procedure Summary       Date: 23 Room / Location: [facility] LABOR DELIVERY    Anesthesia Start:  Anesthesia Stop:     Procedure:  SECTION PRIMARY (Abdomen) Diagnosis:     Surgeons: Canavan, Allison, MD Provider: Chapincito Crandall MD    Anesthesia Type: spinal ASA Status: 3 - Emergent            Anesthesia Type: spinal    Vitals  Vitals Value Taken Time   BP 95/53 23 1743   Temp     Pulse 78 23 1743   Resp     SpO2 96 % 23 1743           Post Anesthesia Care and Evaluation    Patient location during evaluation: bedside  Patient participation: complete - patient participated  Level of consciousness: awake and alert  Pain score: 0  Pain management: adequate    Airway patency: patent  Anesthetic complications: No anesthetic complications    Cardiovascular status: acceptable  Respiratory status: acceptable  Hydration status: acceptable       .

## 2024-04-05 NOTE — DIETITIAN INITIAL EVALUATION ADULT - OTHER INFO
45 y/o male with Guillain [place], CVA and paraplegic admitted with dx renal hematoma. Pt eating fairly as he is undergoing medical workup. He is allergic to apples - Food and Nutrition Dept aware. Food preferences taken and menu alternatives discussed. No BMs - pt noted with hypoactive bowel sounds. He is receiving IVF of LR and IVPB fluids. Edema 1+ generalized at present. No pressure injuries identified. Consider Ensure Enlive 1x daily (350 rishi and 20 gm protein) to optimize nutrition due to hx Guillian [place] syndrome. Pt without additional nutrition related issues or concerns at this time. RDN services to remain available as needed.  [Joint Pain] : joint pain [Negative] : Heme/Lymph

## 2024-07-29 ENCOUNTER — EMERGENCY (EMERGENCY)
Facility: HOSPITAL | Age: 49
LOS: 1 days | Discharge: HOME | End: 2024-07-29
Attending: STUDENT IN AN ORGANIZED HEALTH CARE EDUCATION/TRAINING PROGRAM | Admitting: STUDENT IN AN ORGANIZED HEALTH CARE EDUCATION/TRAINING PROGRAM
Payer: MEDICAID

## 2024-07-29 VITALS
TEMPERATURE: 98 F | RESPIRATION RATE: 16 BRPM | DIASTOLIC BLOOD PRESSURE: 99 MMHG | SYSTOLIC BLOOD PRESSURE: 158 MMHG | HEART RATE: 90 BPM | WEIGHT: 205.03 LBS | OXYGEN SATURATION: 100 %

## 2024-07-29 DIAGNOSIS — Z98.890 OTHER SPECIFIED POSTPROCEDURAL STATES: Chronic | ICD-10-CM

## 2024-07-29 DIAGNOSIS — Z43.0 ENCOUNTER FOR ATTENTION TO TRACHEOSTOMY: Chronic | ICD-10-CM

## 2024-07-29 LAB
ALBUMIN SERPL ELPH-MCNC: 4.1 G/DL — SIGNIFICANT CHANGE UP (ref 3.3–5)
ALP SERPL-CCNC: 289 U/L — HIGH (ref 40–120)
ALT FLD-CCNC: 55 U/L — HIGH (ref 4–41)
ANION GAP SERPL CALC-SCNC: 11 MMOL/L — SIGNIFICANT CHANGE UP (ref 7–14)
AST SERPL-CCNC: 32 U/L — SIGNIFICANT CHANGE UP (ref 4–40)
BILIRUB SERPL-MCNC: 0.6 MG/DL — SIGNIFICANT CHANGE UP (ref 0.2–1.2)
BUN SERPL-MCNC: 14 MG/DL — SIGNIFICANT CHANGE UP (ref 7–23)
CALCIUM SERPL-MCNC: 9.2 MG/DL — SIGNIFICANT CHANGE UP (ref 8.4–10.5)
CHLORIDE SERPL-SCNC: 102 MMOL/L — SIGNIFICANT CHANGE UP (ref 98–107)
CO2 SERPL-SCNC: 25 MMOL/L — SIGNIFICANT CHANGE UP (ref 22–31)
CREAT SERPL-MCNC: 0.59 MG/DL — SIGNIFICANT CHANGE UP (ref 0.5–1.3)
EGFR: 119 ML/MIN/1.73M2 — SIGNIFICANT CHANGE UP
GLUCOSE SERPL-MCNC: 88 MG/DL — SIGNIFICANT CHANGE UP (ref 70–99)
HCT VFR BLD CALC: 47 % — SIGNIFICANT CHANGE UP (ref 39–50)
HGB BLD-MCNC: 14.5 G/DL — SIGNIFICANT CHANGE UP (ref 13–17)
MCHC RBC-ENTMCNC: 27.3 PG — SIGNIFICANT CHANGE UP (ref 27–34)
MCHC RBC-ENTMCNC: 30.9 GM/DL — LOW (ref 32–36)
MCV RBC AUTO: 88.5 FL — SIGNIFICANT CHANGE UP (ref 80–100)
NRBC # BLD: 0 /100 WBCS — SIGNIFICANT CHANGE UP (ref 0–0)
NRBC # FLD: 0 K/UL — SIGNIFICANT CHANGE UP (ref 0–0)
PLATELET # BLD AUTO: 274 K/UL — SIGNIFICANT CHANGE UP (ref 150–400)
POTASSIUM SERPL-MCNC: 4.1 MMOL/L — SIGNIFICANT CHANGE UP (ref 3.5–5.3)
POTASSIUM SERPL-SCNC: 4.1 MMOL/L — SIGNIFICANT CHANGE UP (ref 3.5–5.3)
PROT SERPL-MCNC: 7.6 G/DL — SIGNIFICANT CHANGE UP (ref 6–8.3)
RBC # BLD: 5.31 M/UL — SIGNIFICANT CHANGE UP (ref 4.2–5.8)
RBC # FLD: 15.1 % — HIGH (ref 10.3–14.5)
SODIUM SERPL-SCNC: 138 MMOL/L — SIGNIFICANT CHANGE UP (ref 135–145)
WBC # BLD: 6.99 K/UL — SIGNIFICANT CHANGE UP (ref 3.8–10.5)
WBC # FLD AUTO: 6.99 K/UL — SIGNIFICANT CHANGE UP (ref 3.8–10.5)

## 2024-07-29 PROCEDURE — 99284 EMERGENCY DEPT VISIT MOD MDM: CPT

## 2024-07-29 PROCEDURE — 71046 X-RAY EXAM CHEST 2 VIEWS: CPT | Mod: 26

## 2024-07-29 RX ORDER — MUPIROCIN 20 MG/G
1 OINTMENT TOPICAL ONCE
Refills: 0 | Status: COMPLETED | OUTPATIENT
Start: 2024-07-29 | End: 2024-07-29

## 2024-07-29 RX ADMIN — MUPIROCIN 1 APPLICATION(S): 20 OINTMENT TOPICAL at 11:58

## 2024-07-29 NOTE — ED PROVIDER NOTE - ATTENDING CONTRIBUTION TO CARE
Dr. Celeste, Attending Physician-  I cared for this patient with a fellow.  The fellow documented in this chart as per guidelines.   I have independently reviewed all pertinent clinical information, including the history, physical exam, plan and fellow's documentation, and agree except as noted.  I have edited the fellow's note as I felt medically appropriate.  See above chart documentation for details, including any edits that I have made.  Unless noted otherwise, I am in agreement with the fellow's documentation.    49yoM w/Hx of GBS, CVA, dysphonia, p/w 3 weeks of cough after a URI s/p course of dual antibiotic therapy which pt finished 2d ago. Denies hemoptysis, fever/chills, sore throat, HA, SOB, CP, abd pain, n/v/d/c, neck stiffness, night sweats, weight loss.     VS unremarkable  General: WN/WD NAD  Head: Atraumatic  Eyes: EOM grossly in tact, no scleral icterus  ENT: dry mucous membranes  Neurology: A&Ox3, nonfocal, PARISI x 4  Respiratory: normal respiratory effort  CV: Extremities warm and well perfused  Abdominal: Soft, non-distended  Extremities: No edema  Skin: +pustule to posterior neck at neck fold, with mild erythema though no drainage no fluctuance nontender    Cough likely 2/2 recent bronchitis s/p abx, will order CXR to eval for pna, treat symptomatically and DCTH w/PCP follow up. Neck findings c/w folliculitis no need for abx at this time as not cellulitic in appearance/nature, advised on good hygiene/clean shaving techniques. - Juliann Celeste MD. EM Attending

## 2024-07-29 NOTE — ED ADULT TRIAGE NOTE - STATUS:
Cardiology Progress Note - Robin Spears 54 y o  male MRN: [id number]    Unit/Bed#: [facility] 631-01 Encounter: [id number]      Assessment:  Principal Problem:    COPD with exacerbation ([facility] )  Active Problems:    Colitis    Coronary artery disease involving native coronary artery of native heart    Leukocytosis    Dyslipidemia    Acute kidney failure (HCC)    Alcohol abuse    Tobacco abuse    Alcohol dependence with withdrawal (HCC)    Acute respiratory insufficiency    Agitation    Heart failure, systolic, due to idiopathic cardiomyopathy (HCC)    Presence of ileostomy ([facility])    Moderate protein-calorie malnutrition (HCC)    Anemia    Hypocalcemia    Perforation of colon ([facility] )      Plan:  Patient is comfortable at the present time  He has no chest pain or significant dyspnea  He is postop bowel surgery  He is in sinus rhythm on telemetry  He had no issues overnight  He continues on metoprolol and amiodarone  Would continue 200 mg b i d  of amiodarone for 10 more days and then switch to 200 mg per day  He can continue on aspirin therapy as I do not think he needs anticoagulation unless he has recurrent episode of atrial fibrillation  At the time of discharge he will follow up with his primary cardiologist Dr Serrano Friday  Most recent catheterization performed December 2016 demonstrated patency of the previously placed mid right coronary stent with no significant coronary disease noted  He will need a follow-up echocardiogram in reference to his reduced LV function noted during this hospitalization and this can be arranged by his outpatient cardiologist at his first visit  Please call if I can be of further assistance  Subjective:   Patient seen and examined  No significant events overnight   negative  Objective:     Vitals: Blood pressure 113/64, pulse 96, temperature 98 4 °F (36 9 °C), temperature source Oral, resp  rate 22, height 5' 6" (1 676 m), weight 84 1 kg (185 lb 6 5 oz), SpO2 94 %  , Body mass index is 29 93 kg/m² , Orthostatic Blood Pressures      Most Recent Value   Blood Pressure  113/64 filed at 05/27/2018 2245   Patient Position - Orthostatic VS  Sitting filed at 05/27/2018 2248      ,      Intake/Output Summary (Last 24 hours) at 05/28/18 0745  Last data filed at 05/28/18 8166   Gross per 24 hour   Intake              580 ml   Output             3160 ml   Net            -2580 ml       No significant arrhythmias seen on telemetry review  Physical Exam:    GEN: Fran Hernandez appears well, alert and oriented x 3, pleasant and cooperative   NECK: supple, no carotid bruits, no JVD or HJR  HEART: normal rate, regular rhythm, normal S1 and S2, no murmurs, clicks, gallops or rubs   LUNGS: clear to auscultation bilaterally; no wheezes, rales, or rhonchi   ABDOMEN: normal bowel sounds, soft, no tenderness, no distention  EXTREMITIES: peripheral pulses normal; no clubbing, cyanosis, or edema  SKIN: warm and well perfused, no suspicious lesions on exposed skin    Labs & Results:    Admission on 05/15/2018   No results displayed because visit has over 200 results  Ct Chest Abdomen Pelvis Wo Contrast    Result Date: 5/15/2018  Narrative: CT CHEST, ABDOMEN AND PELVIS WITHOUT IV CONTRAST INDICATION:   sob  Malaise, weakness, incontinence COMPARISON: 12/7/2016 TECHNIQUE: CT examination of the chest, abdomen and pelvis was performed without intravenous contrast   Axial, sagittal, and coronal 2D reformatted images were created from the source data and submitted for interpretation  Radiation dose length product (DLP) for this visit:  727 44 mGy-cm   This examination, like all CT scans performed in the [facility], was performed utilizing techniques to minimize radiation dose exposure, including the use of iterative  reconstruction and automated exposure control  Enteric contrast was not administered   FINDINGS: CHEST LUNGS:  Subtle increased ground glass and nodular densities right lung apex may be inflammatory/infectious  3 mm right lower lung nodular density series 3 image 39, is unchanged  Additional scattered tiny nodular densities bilaterally appear similar  There is no tracheal or endobronchial lesion  PLEURA:  Unremarkable  HEART/GREAT VESSELS:  Unremarkable for patient's age  Coronary artery calcifications  MEDIASTINUM AND ADRIANNA:  Unremarkable  CHEST WALL AND LOWER NECK:   Unremarkable  ABDOMEN LIVER/BILIARY TREE:  Hepatic steatosis  GALLBLADDER:  Tiny layering calcified gallstone  No pericholecystic inflammatory change  SPLEEN:  Unremarkable  PANCREAS:  Unremarkable  ADRENAL GLANDS:  Unremarkable  KIDNEYS/URETERS:  Nonobstructing renal calculi bilaterally measuring up to 6 mm  No hydronephrosis  Stable probable proteinaceous cyst left upper kidney measuring 2 x 1 6 cm  STOMACH AND BOWEL:  Although exam is limited without intravenous contrast, there appears to be wall thickening centered around the sutures in the hepatic flexure of the colon  There is associated inflammatory change and infiltration of the adjacent fat  There may be some involvement of the adjacent descending duodenum  Findings may be related to the history of Crohn's disease/ulcerative colitis  Occult tumor not excluded at this time  No bowel obstruction  APPENDIX:  No findings to suggest appendicitis  ABDOMINOPELVIC CAVITY:  No ascites or free intraperitoneal air  No lymphadenopathy  VESSELS:  Unremarkable for patient's age  PELVIS REPRODUCTIVE ORGANS:  Unremarkable for patient's age  Prostate calcifications  URINARY BLADDER:  Unremarkable  ABDOMINAL WALL/INGUINAL REGIONS:  Unremarkable  OSSEOUS STRUCTURES:  No acute fracture or destructive osseous lesion  Impression: 1    Although exam is limited without intravenous contrast, there appears to be wall thickening centered around the sutures in the hepatic flexure of the colon, with associated inflammatory change and infiltration of the adjacent fat   There may be some involvement of the adjacent descending duodenum  Findings may be related to the history of Crohn's disease/ulcerative colitis  Occult tumor not excluded at this time  Follow-up to resolution recommended  2   Subtle increased ground glass and nodular densities right lung apex may be inflammatory/infectious  CT follow-up in 3-6 months may be considered to ensure resolution  Additional tiny nodular densities bilaterally appear similar  3   Nonobstructing renal calculi  Workstation performed: [id number]     Xr Chest Portable    Result Date: 5/26/2018  Narrative: CHEST INDICATION:   Wheezing  COMPARISON:  5/23/2018 EXAM PERFORMED/VIEWS:  XR CHEST PORTABLE FINDINGS:  The endotracheal tube and endogastric tube has been removed  There is leftward mediastinal shift  There is consolidation of a portion of the left lung in the suprahilar area and also at the base  Given the volume loss, this is probably significant atelectasis although interval development of pneumonia is not entirely excluded and continued follow-up  is suggested  The right lung appears clear  No gross evidence of pleural fluid or pneumothorax  There is slight narrowing of the tracheal lumen in its midportion  Etiology uncertain  There is a scoliotic curvature of the spine with reverse S configuration  There is a tubular device over the left side of abdomen, perhaps an enteric tube or other type of artifactual density  Impression: Considerable volume loss in left hemithorax with consolidation of portion of left lung which could be atelectasis (favored) or pneumonia as an alternate consideration  Recommend continued follow-up and appropriate clinical correlation  Patient extubated  Workstation performed: [id number]     Xr Chest Portable    Result Date: 5/23/2018  Narrative: CHEST INDICATION:   Intubated   COMPARISON:  Chest 5/22/2018 EXAM PERFORMED/VIEWS:  XR CHEST PORTABLE FINDINGS:  Endotracheal tube is present, in satisfactory position with its tip above the level of the amy  Enteric tube is present with its tip extending below the left hemidiaphragm  Cardiomediastinal silhouette appears unremarkable  There is a persistent left basilar opacity and blunting of left costophrenic angle  No additional focal consolidation or pneumothorax  Persistent pulmonary vascular congestion  Osseous structures appear within normal limits for patient age  Impression: 1  Persistent left basilar opacity compatible with effusion and a component of atelectasis or infection  2   Pulmonary vascular congestion  Workstation performed: [id number]     Xr Chest Portable    Result Date: 5/22/2018  Narrative: CHEST INDICATION:   Status post intubation  COMPARISON:  5/22/2018 EXAM PERFORMED/VIEWS:  XR CHEST PORTABLE FINDINGS:  Endotracheal tube is present, in satisfactory position with its tip above the level of the amy  Enteric tube is present with its tip extending below the left hemidiaphragm  Cardiomediastinal silhouette appears unremarkable  There is increased left basilar opacity compatible with small pleural effusion and probable left basilar consolidation on the basis of atelectasis or pneumonia  There is diffusely increased interstitial prominence system with fluid overload  No pneumothorax  Osseous structures appear within normal limits for patient age  Impression: Endotracheal tube in satisfactory position  Vascular congestion  Small left pleural effusion with probable left basilar consolidation due to atelectasis or pneumonia  Workstation performed: [id number]     Xr Chest Portable    Result Date: 5/19/2018  Narrative: CHEST INDICATION:   post intubation  COMPARISON:  5/19/2018 EXAM PERFORMED/VIEWS:  XR CHEST PORTABLE FINDINGS:  ET tube tip is approximately 2 5 cm above the amy  NG tube tip below the diaphragm  Cardiomediastinal silhouette appears unremarkable   Interval increased interstitial and hazy airspace densities bilaterally  Osseous structures appear within normal limits for patient age  Impression: Interval increased interstitial and hazy airspace densities bilaterally  Workstation performed: [id number]     Xr Chest Portable    Result Date: 5/19/2018  Narrative: CHEST INDICATION:   severe respiratory distress r/o Pneumothorax  COMPARISON:  5/19/2018 EXAM PERFORMED/VIEWS:  XR CHEST PORTABLE FINDINGS: Heart shadow is enlarged but unchanged from prior exam  The lungs are clear  No pneumothorax or pleural effusion  Osseous structures appear within normal limits for patient age  Impression: No acute cardiopulmonary disease  Workstation performed: [id number]     Xr Chest Portable    Result Date: 5/19/2018  Narrative: CHEST INDICATION:   hypoxia  COMPARISON:  5/17/2018 EXAM PERFORMED/VIEWS:  XR CHEST PORTABLE FINDINGS: The cardiac silhouette is without change from the prior study  No acute pulmonary disease, no pneumothorax or pleural effusion  Osseous structures appear within normal limits for patient age  Impression: No change from 5/17/2018 Workstation performed: [id number]     Xr Chest Portable    Result Date: 5/18/2018  Narrative: CHEST INDICATION:   hypoxia  COMPARISON:  5/16/2018 EXAM PERFORMED/VIEWS:  XR CHEST PORTABLE FINDINGS: Cardiomediastinal silhouette appears unremarkable  The lungs are clear  No pneumothorax or pleural effusion  Osseous structures appear within normal limits for patient age  Impression: No acute cardiopulmonary disease  Workstation performed: [id number]     Xr Chest Portable    Result Date: 5/16/2018  Narrative: CHEST INDICATION:   Shortness of breath  COMPARISON:  5/15/2018 EXAM PERFORMED/VIEWS:  XR CHEST PORTABLE FINDINGS: The cardiac silhouette is without change from the prior study  A very small left effusion is suspected  Bibasilar atelectasis noted  No pneumothorax or pulmonary edema  Osseous structures appear within normal limits for patient age  Impression: Very small left effusion with bibasilar subsegmental atelectasis Workstation performed: [id number]     Xr Chest Portable    Result Date: 5/15/2018  Narrative: CHEST INDICATION: 27-year-old male, shortness of breath COMPARISON: 12/7/2016 chest x-ray EXAM PERFORMED/VIEWS:  XR CHEST PORTABLE Single image FINDINGS: Cardiomediastinal silhouette appears unremarkable  The lungs are clear  No pneumothorax or pleural effusion  Osseous structures appear within normal limits for patient age  Impression: No acute cardiopulmonary disease  Findings are stable  Findings are consistent with emergency provider's preliminary reading Workstation performed: [id number]     Xr Abdomen 1 Vw Portable    Result Date: 5/22/2018  Narrative: ABDOMEN INDICATION: Instrument miscount in OR  COMPARISON:  10/23/2016 VIEWS:  AP supine FINDINGS: There are surgical drain projecting over the left lateral abdomen and right lower quadrant  No metallic surgical instruments identified  Partially imaged enterogastric feeding tube in the left upper quadrant  There is a nonspecific bowel gas pattern  Visualized osseous structures are unremarkable for the patient's age  Impression: No metallic surgical instrument identified  Workstation performed: [id number]     Xr Chest Portable Icu    Result Date: 5/22/2018  Narrative: CHEST INDICATION:   f/u resp failure  COMPARISON:  Chest x-ray from 5/21/2018  EXAM PERFORMED/VIEWS:  XR CHEST PORTABLE ICU FINDINGS:  Endotracheal tube and nasogastric tube have been removed  Cardiomediastinal silhouette appears unremarkable  The lungs are clear  No pneumothorax or pleural effusion  Osseous structures appear within normal limits for patient age  There is free intraperitoneal air  In retrospect, this was present on the 5/21/2018 chest x-ray, but was obscured by overlying EKG leads  Impression: There is free intraperitoneal air   I personally discussed this study with Quinn Romero on 5/22/2018 at 8:51 AM  Workstation performed: [id number]     Xr Chest Portable Icu    Result Date: 5/21/2018  Narrative: CHEST INDICATION:   RESP FAILURE  COMPARISON:  5/20/2018 EXAM PERFORMED/VIEWS:  XR CHEST PORTABLE ICU FINDINGS: Cardiomegaly noted as before  Endotracheal and nasogastric tubes both in satisfactory position  Mild central congestion  No consolidation or pneumothorax  Small left basilar pleural effusion  Osseous structures appear within normal limits for patient age  Impression: Stable chest  Workstation performed: [id number]     Xr Chest Portable Icu    Result Date: 5/20/2018  Narrative: CHEST INDICATION:   Respiratory failure  COMPARISON:  May 19, 2018 EXAM PERFORMED/VIEWS:  XR CHEST PORTABLE ICU FINDINGS:  Endotracheal tube and nasogastric tube in place  Cardiomediastinal silhouette appears unremarkable  Patchy right infrahilar airspace opacity likely atelectasis  Stable from prior  Improved aeration in the left lung  Osseous structures appear within normal limits for patient age  Impression: Improved aeration left lung  Persistent right infrahilar opacity is likely atelectasis  Workstation performed: [id number]       EKG personally reviewed by Yumiko Khan MD      Counseling / Coordination of Care  Total floor / unit time spent today 30 minutes  Greater than 50% of total time was spent with the patient and / or family counseling and / or coordination of care  Applied

## 2024-07-29 NOTE — ED ADULT NURSE NOTE - NS PRO AD PATIENT TYPE
1.  Take all antibiotics as prescribed.  It is imperative that once you start them, you take them to completion.     2.  For patients above 6 months of age who are not allergic to and are not on anticoagulants, you can alternate Tylenol and Motrin every 4-6 hours for fever above 100.4F and/or pain.  For patients less than 6 months of age, allergic to or intolerant to NSAIDS, have gastritis, gastric ulcers, or history of GI bleeds, are pregnant, or are on anticoagulant therapy, you can take Tylenol every 4 hours as needed for fever above 100.4F and/or pain.     3.  Rest and keep yourself well hydrated.  Drink hot liquids (coffee, water, tea, hot chocolate, or soup) 10-12 times a day for 5-7 days.  Put liquid in a mug and place in microwave for 2.5 - 3 minutes. Pour hot liquid into another mug not used to microwave the liquid (to avoid burning your mouth) then sniff the steam from the cup and sip the heated liquid.    4.  You can use these over the counter medications/remedies to help with your symptoms:     Runny Nose:  Use an antihistamine such as Claritin, Zyrtec or Allegra to help dry you out.     Congestion:  Use pseudoephedrine (behind the counter) for congestion- Pseudoephedrine 30 mg up to 240 mg /day. Warning:  It can raise your blood pressure and give you palpitations.  Coricidin HBP is okay to use if you have high blood pressure.     Use mucinex (guaifenisin) up to 2400mg/day to break up/loosen any mucous. MucinexDM has a cough suppressant that can be used for cough and at night to stop the tickle in the back of your throat.    Use Nasal Saline to mechanically move any post nasal drip from your eustachian tubes or from the back of your throat.    Use Afrin in each nare, for no longer than 3 days, as it is addictive. It can also dry out your mucous membranes and cause elevated blood pressure.    Use Flonase 1-2 sprays/nostril per day. It is a local acting steroid nasal spray.  If you develop a bloody nose,  stop using the medication immediately.    Sore throat:  Use warm, salt water gargles to ease your throat pain- 1/2 tsp salt to 1 cup warm water, gargle as desired.  Chloraseptic sprays and throat lozenges will also help to ease throat pain.     Sometimes Nyquil at night is beneficial to help you get some rest; however, it is sedating and does contain an antihistamine and Tylenol.  Make sure not to double up on these medications.      These things will help you to feel better and will speed your recovery.  If your condition fails to improve in a timely manner, you should receive another evaluation by your Primary Care Provider/Pediatrician to discuss your concerns or return to urgent care for a recheck.  If your condition worsens at any time, you should report immediately to your nearest Emergency Department for further evaluation. **You must understand that you have received Urgent Care treatment only and that you may be released before all of your medical problems are known or treated. You, the patient, are responsible to arrange for follow-up care as instructed.          Health Care Proxy (HCP)

## 2024-07-29 NOTE — ED PROVIDER NOTE - SKIN, MLM
Skin normal color for race, warm, dry and intact. pustule to posterior neck at neck fold, with mild erythema though no drainage no fluctuance

## 2024-07-29 NOTE — ED PROVIDER NOTE - PATIENT PORTAL LINK FT
You can access the FollowMyHealth Patient Portal offered by [facility] by registering at the following website: [email or web address]. By joining [facility] FollowMyHealth portal, you will also be able to view your health information using other applications (apps) compatible with our system.

## 2024-07-29 NOTE — ED PROVIDER NOTE - CLINICAL SUMMARY MEDICAL DECISION MAKING FREE TEXT BOX
well appearing with folliculitis to neck not extensive no high risk fx likely simple overlying cellulitis no abscess noted  encourage use of clean needles and continued cleaning of skin are   given hx of pna recently and continued cough likely bronchitis kaylie with b/l clear lungs though will ro continued pna

## 2024-07-29 NOTE — ED PROVIDER NOTE - OBJECTIVE STATEMENT
pmh Guillain-[place], CVA, dysphonia, pw cough for 3 weeks was recently treated for pna finished 2 abx (known type) 2 days ago. no fevers cp sob abd pain  n/v/   + neck bumps that are mildly painful to the touch, uses clean razors to back of neck for shaving

## 2024-07-29 NOTE — ED PROVIDER NOTE - NSFOLLOWUPINSTRUCTIONS_ED_ALL_ED_FT
please follow up with your primary care physician   likely you have bronchitis (chronic cough)   please return to your closest emergency room if symptoms worsen or change

## 2024-07-29 NOTE — ED ADULT NURSE NOTE - NSFALLRISKINTERV_ED_ALL_ED

## 2024-07-29 NOTE — ED ADULT NURSE NOTE - CHIEF COMPLAINT QUOTE
Pt arrives with aide, c/o productive cough x 3 weeks. Denies SOB, fever, chills, chest pain. pmhx: Guillain-[place], CVA, dysphonia

## 2024-07-29 NOTE — ED ADULT TRIAGE NOTE - CHIEF COMPLAINT QUOTE
Pt arrives with aide, c/o productive cough x 3 weeks. Denies SOB, fever, chills, chest pain. pmhx: Guillain-[place], CVA, dysphonia

## 2025-06-13 NOTE — ED PROVIDER NOTE - CPE EDP [facility] NORM
Patient's mother called asking for strep culture results.  I couldn't see results in epic.  Lab had a print off of results from 6/8 for a positive strep PCR.  Anitra phillips couldn't see the results in epic either.  There was an IT ticket placed to figure out why the two systems are not communicating based off of this situation.  Dr. Pool taking care of calling in antibiotic for patient.  Leilani Stevens RN     - - -

## (undated) DEVICE — TUBING LEVEL ONE NORMOFLO SET

## (undated) DEVICE — VENODYNE/SCD SLEEVE CALF MEDIUM

## (undated) DEVICE — IRR BULB PATHFINDER + 10"

## (undated) DEVICE — DRAPE COVER SNAP 36X30"

## (undated) DEVICE — CANISTER DISPOSABLE THIN WALL 3000CC

## (undated) DEVICE — PACK CYSTO

## (undated) DEVICE — ADAPTER CHECK FLO 9FR STERILE

## (undated) DEVICE — POSITIONER STRAP ARMBOARD VELCRO TS-30

## (undated) DEVICE — SOL IRR POUR H2O 500ML

## (undated) DEVICE — WARMING BLANKET UPPER ADULT

## (undated) DEVICE — SOL IRR BAG NS 0.9% 3000ML